# Patient Record
Sex: FEMALE | Race: BLACK OR AFRICAN AMERICAN | NOT HISPANIC OR LATINO | Employment: OTHER | RURAL
[De-identification: names, ages, dates, MRNs, and addresses within clinical notes are randomized per-mention and may not be internally consistent; named-entity substitution may affect disease eponyms.]

---

## 2017-06-02 ENCOUNTER — OFFICE VISIT (OUTPATIENT)
Dept: FAMILY MEDICINE CLINIC | Facility: CLINIC | Age: 52
End: 2017-06-02

## 2017-06-02 VITALS
TEMPERATURE: 98.2 F | BODY MASS INDEX: 40.05 KG/M2 | HEART RATE: 91 BPM | OXYGEN SATURATION: 98 % | DIASTOLIC BLOOD PRESSURE: 94 MMHG | WEIGHT: 204 LBS | HEIGHT: 60 IN | RESPIRATION RATE: 16 BRPM | SYSTOLIC BLOOD PRESSURE: 162 MMHG

## 2017-06-02 DIAGNOSIS — J30.1 SEASONAL ALLERGIC RHINITIS DUE TO POLLEN: ICD-10-CM

## 2017-06-02 DIAGNOSIS — E11.9 TYPE 2 DIABETES MELLITUS WITHOUT COMPLICATION, WITHOUT LONG-TERM CURRENT USE OF INSULIN (HCC): ICD-10-CM

## 2017-06-02 DIAGNOSIS — I10 ESSENTIAL HYPERTENSION: Primary | ICD-10-CM

## 2017-06-02 DIAGNOSIS — Z11.59 NEED FOR HEPATITIS C SCREENING TEST: ICD-10-CM

## 2017-06-02 PROCEDURE — 99213 OFFICE O/P EST LOW 20 MIN: CPT | Performed by: NURSE PRACTITIONER

## 2017-06-02 RX ORDER — DILTIAZEM HYDROCHLORIDE 120 MG/1
120 TABLET, FILM COATED ORAL 2 TIMES DAILY
Qty: 30 TABLET | Refills: 5 | Status: SHIPPED | OUTPATIENT
Start: 2017-06-02 | End: 2017-11-02 | Stop reason: SDUPTHER

## 2017-06-02 RX ORDER — CLONIDINE HYDROCHLORIDE 0.2 MG/1
0.2 TABLET ORAL DAILY
Qty: 30 TABLET | Refills: 5 | Status: SHIPPED | OUTPATIENT
Start: 2017-06-02 | End: 2017-11-02 | Stop reason: SDUPTHER

## 2017-06-02 RX ORDER — LOSARTAN POTASSIUM 100 MG/1
100 TABLET ORAL DAILY
Qty: 30 TABLET | Refills: 3 | Status: SHIPPED | OUTPATIENT
Start: 2017-06-02 | End: 2017-11-02 | Stop reason: SDUPTHER

## 2017-06-02 RX ORDER — SPIRONOLACTONE 25 MG/1
25 TABLET ORAL DAILY
Qty: 30 TABLET | Refills: 5 | Status: SHIPPED | OUTPATIENT
Start: 2017-06-02 | End: 2017-11-02 | Stop reason: SDUPTHER

## 2017-06-02 RX ORDER — GLIMEPIRIDE 4 MG/1
4 TABLET ORAL 2 TIMES DAILY
Qty: 60 TABLET | Refills: 5 | Status: SHIPPED | OUTPATIENT
Start: 2017-06-02 | End: 2017-11-02 | Stop reason: SDUPTHER

## 2017-06-02 RX ORDER — CETIRIZINE HYDROCHLORIDE 10 MG/1
10 TABLET ORAL DAILY
Qty: 30 TABLET | Refills: 5 | Status: SHIPPED | OUTPATIENT
Start: 2017-06-02 | End: 2017-11-02 | Stop reason: SDUPTHER

## 2017-06-02 NOTE — PROGRESS NOTES
Subjective   Modesta Kaur is a 51 y.o. female.     HPI Comments: Here today for zhanna on her dm and her htn.  She says her b/s is running about 100 or less most of the time.  She did not get her labs drawn the last time she was here.    Diabetes   She presents for her follow-up diabetic visit. She has type 2 diabetes mellitus. Her disease course has been stable. There are no hypoglycemic associated symptoms. Pertinent negatives for hypoglycemia include no headaches or sweats. There are no diabetic associated symptoms. Pertinent negatives for diabetes include no blurred vision and no chest pain. There are no hypoglycemic complications. Symptoms are stable. There are no diabetic complications. Pertinent negatives for diabetic complications include no CVA, PVD or retinopathy. Risk factors for coronary artery disease include diabetes mellitus, hypertension, obesity and post-menopausal. Current diabetic treatment includes oral agent (dual therapy). She is compliant with treatment all of the time. Her weight is stable. She is following a diabetic diet. When asked about meal planning, she reported none. She has not had a previous visit with a dietitian. She rarely participates in exercise. She monitors blood glucose at home 1-2 x per day. Her breakfast blood glucose is taken between 6-7 am. Her breakfast blood glucose range is generally  mg/dl. An ACE inhibitor/angiotensin II receptor blocker is being taken. She does not see a podiatrist.Eye exam is current.   Hypertension   This is a chronic problem. The current episode started more than 1 year ago. The problem is controlled. Pertinent negatives include no anxiety, blurred vision, chest pain, headaches, malaise/fatigue, neck pain, orthopnea, palpitations, peripheral edema, PND, shortness of breath or sweats. There are no associated agents to hypertension. Risk factors for coronary artery disease include diabetes mellitus, obesity and post-menopausal state. Past  treatments include angiotensin blockers, central alpha agonists and calcium channel blockers. The current treatment provides significant improvement. There are no compliance problems.  There is no history of angina, kidney disease, CAD/MI, CVA, heart failure, left ventricular hypertrophy, PVD or retinopathy.        The following portions of the patient's history were reviewed and updated as appropriate: allergies, current medications, past family history, past medical history, past social history, past surgical history and problem list.    Review of Systems   Constitutional: Negative.  Negative for malaise/fatigue.   HENT: Negative.    Eyes: Negative for blurred vision.   Respiratory: Negative.  Negative for shortness of breath.    Cardiovascular: Negative.  Negative for chest pain, palpitations, orthopnea and PND.   Musculoskeletal: Negative.  Negative for neck pain.   Skin: Negative.    Neurological: Negative.  Negative for headaches.   Psychiatric/Behavioral: Negative.        Objective   Physical Exam   Constitutional: She is oriented to person, place, and time. She appears well-developed and well-nourished. No distress.   HENT:   Head: Normocephalic.   Eyes: Pupils are equal, round, and reactive to light.   Neck: Normal range of motion. No thyromegaly present.   Cardiovascular: Normal rate, regular rhythm and normal heart sounds.  Exam reveals no friction rub.    No murmur heard.  Pulmonary/Chest: Effort normal and breath sounds normal. No respiratory distress. She has no wheezes. She has no rales.   Abdominal: Soft.   Musculoskeletal: Normal range of motion.   Neurological: She is alert and oriented to person, place, and time.   Skin: Skin is warm and dry.   Psychiatric: She has a normal mood and affect. Thought content normal.   Nursing note and vitals reviewed.      Assessment/Plan   Modesta was seen today for diabetes and hypertension.    Diagnoses and all orders for this visit:    Essential hypertension  -      losartan (COZAAR) 100 MG tablet; Take 1 tablet by mouth Daily.  -     CloNIDine (CATAPRES) 0.2 MG tablet; Take 1 tablet by mouth Daily.  -     diltiaZEM (CARDIZEM) 120 MG tablet; Take 1 tablet by mouth 2 (Two) Times a Day.  -     spironolactone (ALDACTONE) 25 MG tablet; Take 1 tablet by mouth Daily.  -     Comprehensive metabolic panel  -     Lipid panel    Type 2 diabetes mellitus without complication, without long-term current use of insulin  -     metFORMIN (GLUCOPHAGE) 1000 MG tablet; Take 1 tablet by mouth 2 (Two) Times a Day.  -     glimepiride (AMARYL) 4 MG tablet; Take 1 tablet by mouth 2 (Two) Times a Day.  -     MicroAlbumin, Urine, Random  -     Hemoglobin A1c  -     Lipid panel    Seasonal allergic rhinitis due to pollen  -     cetirizine (zyrTEC) 10 MG tablet; Take 1 tablet by mouth Daily.    Need for hepatitis C screening test  -     Hepatitis C Antibody      Lab Results   Component Value Date    HGBA1C 7.0 (H) 05/23/2016

## 2017-06-08 ENCOUNTER — LAB (OUTPATIENT)
Dept: LAB | Facility: CLINIC | Age: 52
End: 2017-06-08

## 2017-06-08 DIAGNOSIS — E11.9 TYPE 2 DIABETES MELLITUS WITHOUT COMPLICATION, WITHOUT LONG-TERM CURRENT USE OF INSULIN (HCC): ICD-10-CM

## 2017-06-08 DIAGNOSIS — I10 ESSENTIAL HYPERTENSION: ICD-10-CM

## 2017-06-08 LAB
ALBUMIN SERPL-MCNC: 4.1 G/DL (ref 3.4–4.8)
ALBUMIN UR-MCNC: 5.5 MG/L
ALBUMIN/GLOB SERPL: 1.2 G/DL (ref 1.1–1.8)
ALP SERPL-CCNC: 124 U/L (ref 38–126)
ALT SERPL W P-5'-P-CCNC: 34 U/L (ref 9–52)
ANION GAP SERPL CALCULATED.3IONS-SCNC: 11 MMOL/L (ref 5–15)
ARTICHOKE IGE QN: 136 MG/DL (ref 1–129)
AST SERPL-CCNC: 19 U/L (ref 14–36)
BILIRUB SERPL-MCNC: 0.5 MG/DL (ref 0.2–1.3)
BUN BLD-MCNC: 15 MG/DL (ref 7–21)
BUN/CREAT SERPL: 16.7 (ref 7–25)
CALCIUM SPEC-SCNC: 9.4 MG/DL (ref 8.4–10.2)
CHLORIDE SERPL-SCNC: 103 MMOL/L (ref 95–110)
CHOLEST SERPL-MCNC: 180 MG/DL (ref 0–199)
CO2 SERPL-SCNC: 29 MMOL/L (ref 22–31)
CREAT BLD-MCNC: 0.9 MG/DL (ref 0.5–1)
GFR SERPL CREATININE-BSD FRML MDRD: 80 ML/MIN/1.73 (ref 51–120)
GLOBULIN UR ELPH-MCNC: 3.5 GM/DL (ref 2.3–3.5)
GLUCOSE BLD-MCNC: 109 MG/DL (ref 60–100)
HBA1C MFR BLD: 7.57 % (ref 4–5.6)
HDLC SERPL-MCNC: 41 MG/DL (ref 60–200)
LDLC/HDLC SERPL: 2.82 {RATIO} (ref 0–3.22)
POTASSIUM BLD-SCNC: 4.3 MMOL/L (ref 3.5–5.1)
PROT SERPL-MCNC: 7.6 G/DL (ref 6.3–8.6)
SODIUM BLD-SCNC: 143 MMOL/L (ref 137–145)
TRIGL SERPL-MCNC: 116 MG/DL (ref 20–199)

## 2017-06-08 PROCEDURE — 82043 UR ALBUMIN QUANTITATIVE: CPT | Performed by: NURSE PRACTITIONER

## 2017-06-08 PROCEDURE — 83036 HEMOGLOBIN GLYCOSYLATED A1C: CPT | Performed by: NURSE PRACTITIONER

## 2017-06-08 PROCEDURE — 86803 HEPATITIS C AB TEST: CPT | Performed by: NURSE PRACTITIONER

## 2017-06-08 PROCEDURE — 80053 COMPREHEN METABOLIC PANEL: CPT | Performed by: NURSE PRACTITIONER

## 2017-06-08 PROCEDURE — 80061 LIPID PANEL: CPT | Performed by: NURSE PRACTITIONER

## 2017-06-09 LAB — HCV AB SER DONR QL: NEGATIVE

## 2017-11-02 ENCOUNTER — OFFICE VISIT (OUTPATIENT)
Dept: FAMILY MEDICINE CLINIC | Facility: CLINIC | Age: 52
End: 2017-11-02

## 2017-11-02 VITALS
HEIGHT: 60 IN | SYSTOLIC BLOOD PRESSURE: 160 MMHG | BODY MASS INDEX: 40.25 KG/M2 | HEART RATE: 94 BPM | OXYGEN SATURATION: 98 % | RESPIRATION RATE: 18 BRPM | WEIGHT: 205 LBS | DIASTOLIC BLOOD PRESSURE: 84 MMHG | TEMPERATURE: 98.3 F

## 2017-11-02 DIAGNOSIS — I10 ESSENTIAL HYPERTENSION: ICD-10-CM

## 2017-11-02 DIAGNOSIS — E11.9 TYPE 2 DIABETES MELLITUS WITHOUT COMPLICATION, WITHOUT LONG-TERM CURRENT USE OF INSULIN (HCC): Primary | ICD-10-CM

## 2017-11-02 DIAGNOSIS — J30.1 SEASONAL ALLERGIC RHINITIS DUE TO POLLEN, UNSPECIFIED CHRONICITY: ICD-10-CM

## 2017-11-02 PROCEDURE — 99214 OFFICE O/P EST MOD 30 MIN: CPT | Performed by: NURSE PRACTITIONER

## 2017-11-02 RX ORDER — LOSARTAN POTASSIUM 100 MG/1
100 TABLET ORAL DAILY
Qty: 30 TABLET | Refills: 3 | Status: SHIPPED | OUTPATIENT
Start: 2017-11-02 | End: 2018-06-04

## 2017-11-02 RX ORDER — GLIMEPIRIDE 4 MG/1
4 TABLET ORAL 2 TIMES DAILY
Qty: 60 TABLET | Refills: 5 | Status: SHIPPED | OUTPATIENT
Start: 2017-11-02 | End: 2017-11-09 | Stop reason: SDUPTHER

## 2017-11-02 RX ORDER — CLONIDINE HYDROCHLORIDE 0.2 MG/1
0.2 TABLET ORAL DAILY
Qty: 30 TABLET | Refills: 5 | Status: SHIPPED | OUTPATIENT
Start: 2017-11-02 | End: 2018-06-04

## 2017-11-02 RX ORDER — SPIRONOLACTONE 25 MG/1
25 TABLET ORAL DAILY
Qty: 30 TABLET | Refills: 5 | Status: SHIPPED | OUTPATIENT
Start: 2017-11-02 | End: 2018-06-19 | Stop reason: SDUPTHER

## 2017-11-02 RX ORDER — DILTIAZEM HYDROCHLORIDE 120 MG/1
120 TABLET, FILM COATED ORAL 2 TIMES DAILY
Qty: 30 TABLET | Refills: 5 | Status: SHIPPED | OUTPATIENT
Start: 2017-11-02 | End: 2018-06-04

## 2017-11-02 RX ORDER — CETIRIZINE HYDROCHLORIDE 10 MG/1
10 TABLET ORAL DAILY
Qty: 30 TABLET | Refills: 5 | Status: SHIPPED | OUTPATIENT
Start: 2017-11-02 | End: 2018-06-19 | Stop reason: SDUPTHER

## 2017-11-02 NOTE — PROGRESS NOTES
Subjective   Modesta Kaur is a 51 y.o. female.     HPI Comments: Here for zhanna on her dm and her htn.  She is doing well and having no diff.  Her b/s runs about 150 on average.      Hypertension   This is a chronic problem. The current episode started more than 1 year ago. The problem is controlled. Pertinent negatives include no anxiety, blurred vision, chest pain, headaches, malaise/fatigue, neck pain, orthopnea, palpitations, peripheral edema, PND, shortness of breath or sweats. There are no associated agents to hypertension. Risk factors for coronary artery disease include diabetes mellitus, obesity and post-menopausal state. Past treatments include central alpha agonists, calcium channel blockers, angiotensin blockers and diuretics. The current treatment provides significant improvement. There are no compliance problems.  There is no history of angina, kidney disease, CAD/MI, CVA, heart failure, left ventricular hypertrophy, PVD or retinopathy.   Diabetes   She presents for her follow-up diabetic visit. She has type 2 diabetes mellitus. Her disease course has been stable. There are no hypoglycemic associated symptoms. Pertinent negatives for hypoglycemia include no headaches or sweats. There are no diabetic associated symptoms. Pertinent negatives for diabetes include no blurred vision and no chest pain. There are no hypoglycemic complications. Symptoms are stable. There are no diabetic complications. Pertinent negatives for diabetic complications include no CVA, PVD or retinopathy. Risk factors for coronary artery disease include diabetes mellitus, hypertension, obesity, post-menopausal and sedentary lifestyle. Current diabetic treatment includes oral agent (dual therapy). She is compliant with treatment all of the time. Her weight is stable. She is following a diabetic diet. Meal planning includes avoidance of concentrated sweets. She has not had a previous visit with a dietitian. She participates in  exercise intermittently. She monitors blood glucose at home 1-2 x per day. Her breakfast blood glucose is taken between 6-7 am. Her breakfast blood glucose range is generally 140-180 mg/dl. An ACE inhibitor/angiotensin II receptor blocker is being taken. She does not see a podiatrist.Eye exam is current.        The following portions of the patient's history were reviewed and updated as appropriate: allergies, current medications, past family history, past medical history, past social history, past surgical history and problem list.    Review of Systems   Constitutional: Negative.  Negative for malaise/fatigue.   HENT: Negative.    Eyes: Negative for blurred vision.   Respiratory: Negative.  Negative for shortness of breath.    Cardiovascular: Negative.  Negative for chest pain, palpitations, orthopnea and PND.   Musculoskeletal: Negative for neck pain.   Skin: Negative.    Neurological: Negative.  Negative for headaches.   Psychiatric/Behavioral: Negative.        Objective   Physical Exam   Constitutional: She is oriented to person, place, and time. She appears well-developed and well-nourished. No distress.   HENT:   Head: Normocephalic.   Eyes: Pupils are equal, round, and reactive to light.   Neck: Normal range of motion. Neck supple. No thyromegaly present.   Cardiovascular: Normal rate, regular rhythm and normal heart sounds.  Exam reveals no friction rub.    No murmur heard.  Pulmonary/Chest: Effort normal and breath sounds normal. No respiratory distress. She has no wheezes. She has no rales.   Abdominal: Soft.   Musculoskeletal: Normal range of motion.   Neurological: She is alert and oriented to person, place, and time.   Skin: Skin is warm and dry.   Psychiatric: She has a normal mood and affect. Thought content normal.   Nursing note and vitals reviewed.      Assessment/Plan   Modesta was seen today for hypertension and diabetes.    Diagnoses and all orders for this visit:    Type 2 diabetes mellitus  without complication, without long-term current use of insulin  -     Hemoglobin A1c; Future  -     glimepiride (AMARYL) 4 MG tablet; Take 1 tablet by mouth 2 (Two) Times a Day.  -     metFORMIN (GLUCOPHAGE) 1000 MG tablet; Take 1 tablet by mouth 2 (Two) Times a Day.    Seasonal allergic rhinitis due to pollen, unspecified chronicity  -     cetirizine (zyrTEC) 10 MG tablet; Take 1 tablet by mouth Daily.    Essential hypertension  -     CloNIDine (CATAPRES) 0.2 MG tablet; Take 1 tablet by mouth Daily.  -     diltiaZEM (CARDIZEM) 120 MG tablet; Take 1 tablet by mouth 2 (Two) Times a Day.  -     losartan (COZAAR) 100 MG tablet; Take 1 tablet by mouth Daily.  -     spironolactone (ALDACTONE) 25 MG tablet; Take 1 tablet by mouth Daily.      Lab Results   Component Value Date    HGBA1C 7.57 (H) 06/08/2017

## 2017-11-09 DIAGNOSIS — E11.9 TYPE 2 DIABETES MELLITUS WITHOUT COMPLICATION, WITHOUT LONG-TERM CURRENT USE OF INSULIN (HCC): ICD-10-CM

## 2017-11-09 DIAGNOSIS — J45.20 MILD INTERMITTENT ASTHMA WITHOUT COMPLICATION: ICD-10-CM

## 2017-11-09 RX ORDER — GLIMEPIRIDE 4 MG/1
4 TABLET ORAL 2 TIMES DAILY
Qty: 60 TABLET | Refills: 5 | Status: SHIPPED | OUTPATIENT
Start: 2017-11-09 | End: 2018-06-04

## 2017-11-29 ENCOUNTER — OFFICE VISIT (OUTPATIENT)
Dept: FAMILY MEDICINE CLINIC | Facility: CLINIC | Age: 52
End: 2017-11-29

## 2017-11-29 VITALS
HEIGHT: 60 IN | SYSTOLIC BLOOD PRESSURE: 170 MMHG | WEIGHT: 201 LBS | BODY MASS INDEX: 39.46 KG/M2 | DIASTOLIC BLOOD PRESSURE: 96 MMHG | HEART RATE: 98 BPM | OXYGEN SATURATION: 98 % | TEMPERATURE: 97.3 F

## 2017-11-29 DIAGNOSIS — M79.604 RIGHT LEG PAIN: ICD-10-CM

## 2017-11-29 DIAGNOSIS — M79.89 SWELLING OF RIGHT LOWER EXTREMITY: Primary | ICD-10-CM

## 2017-11-29 PROCEDURE — 99213 OFFICE O/P EST LOW 20 MIN: CPT | Performed by: NURSE PRACTITIONER

## 2017-11-29 NOTE — PROGRESS NOTES
"Lázaro Kaur is a 52 y.o. female.     HPI Comments: Denies history of DVT, recent travel or surgery, denies tobacco use or hormone use.     Leg Swelling   This is a new problem. The current episode started in the past 7 days. The problem occurs constantly. Progression since onset: swelling has slightly decreased, but redness and pain have worsened. Associated symptoms include a rash (RLE red, peeling skin). Pertinent negatives include no abdominal pain, anorexia, arthralgias, change in bowel habit, chest pain, chills, congestion, coughing, diaphoresis, fatigue, fever, headaches, joint swelling, myalgias, nausea, neck pain, numbness, sore throat, swollen glands, urinary symptoms, vertigo, visual change, vomiting or weakness. Associated symptoms comments: Reports she was seen at [facility] ER last week for the \"flu\" and treated with Levaquin and that symptoms of swelling started the same day she was seen in ER and redness and pain have gradually worsened and now having peeling of skin  . The symptoms are aggravated by standing and walking. Treatments tried: elevation. The treatment provided no relief.        The following portions of the patient's history were reviewed and updated as appropriate: allergies, current medications, past medical history, past social history, past surgical history and problem list.    Review of Systems   Constitutional: Negative for chills, diaphoresis, fatigue and fever.   HENT: Negative for congestion and sore throat.    Eyes: Negative for discharge and itching.   Respiratory: Negative for cough, chest tightness, shortness of breath and wheezing.    Cardiovascular: Positive for leg swelling ( RLE). Negative for chest pain and palpitations.   Gastrointestinal: Negative for abdominal pain, anorexia, change in bowel habit, diarrhea, nausea and vomiting.   Musculoskeletal: Negative for arthralgias, joint swelling, myalgias, neck pain and neck stiffness.   Skin: Positive for rash (RLE " "red, peeling skin).   Neurological: Negative for dizziness, vertigo, weakness, numbness and headaches.   Hematological: Negative for adenopathy.       Objective    /96 (BP Location: Left arm, Patient Position: Sitting, Cuff Size: Adult)  Pulse 98  Temp 97.3 °F (36.3 °C) (Tympanic)   Ht 60\" (152.4 cm)  Wt 201 lb (91.2 kg)  LMP  (LMP Unknown)  SpO2 98%  BMI 39.26 kg/m2    Physical Exam   Constitutional: She is oriented to person, place, and time. She appears well-developed and well-nourished. No distress.   Cardiovascular: Normal rate and regular rhythm.    Negative homans sign   Pulmonary/Chest: Effort normal and breath sounds normal.   Neurological: She is alert and oriented to person, place, and time.   Skin:        RLE edema with erythema, warmth and peeling of skin primarily over toes and foot   Nursing note and vitals reviewed.      Assessment/Plan   Modesta was seen today for leg swelling and foot swelling.    Diagnoses and all orders for this visit:    Swelling of right lower extremity  -     US venous doppler lower extremity right (duplex)    Right leg pain  -     US venous doppler lower extremity right (duplex)      RLE ultrasound scheduled for this afternoon.  Will call patient with results and proceed with treatment as needed.          "

## 2017-12-01 RX ORDER — CLINDAMYCIN HYDROCHLORIDE 300 MG/1
300 CAPSULE ORAL 4 TIMES DAILY
Qty: 40 CAPSULE | Refills: 0 | Status: SHIPPED | OUTPATIENT
Start: 2017-12-01 | End: 2018-04-01

## 2017-12-01 RX ORDER — CLOTRIMAZOLE AND BETAMETHASONE DIPROPIONATE 10; .64 MG/G; MG/G
CREAM TOPICAL 2 TIMES DAILY
Qty: 45 G | Refills: 0 | Status: SHIPPED | OUTPATIENT
Start: 2017-12-01 | End: 2018-04-01

## 2018-04-01 ENCOUNTER — APPOINTMENT (OUTPATIENT)
Dept: CT IMAGING | Facility: HOSPITAL | Age: 53
End: 2018-04-01

## 2018-04-01 ENCOUNTER — HOSPITAL ENCOUNTER (EMERGENCY)
Facility: HOSPITAL | Age: 53
Discharge: ANOTHER HOSPITAL | End: 2018-04-01
Attending: FAMILY MEDICINE | Admitting: FAMILY MEDICINE

## 2018-04-01 VITALS
RESPIRATION RATE: 18 BRPM | DIASTOLIC BLOOD PRESSURE: 91 MMHG | OXYGEN SATURATION: 96 % | TEMPERATURE: 98.1 F | BODY MASS INDEX: 39.27 KG/M2 | SYSTOLIC BLOOD PRESSURE: 200 MMHG | WEIGHT: 200 LBS | HEIGHT: 60 IN | HEART RATE: 88 BPM

## 2018-04-01 DIAGNOSIS — I63.9 CEREBROVASCULAR ACCIDENT (CVA), UNSPECIFIED MECHANISM (HCC): Primary | ICD-10-CM

## 2018-04-01 LAB
ALBUMIN SERPL-MCNC: 4.4 G/DL (ref 3.4–4.8)
ALBUMIN/GLOB SERPL: 1 G/DL (ref 1.1–1.8)
ALP SERPL-CCNC: 193 U/L (ref 38–126)
ALT SERPL W P-5'-P-CCNC: 27 U/L (ref 9–52)
ANION GAP SERPL CALCULATED.3IONS-SCNC: 14 MMOL/L (ref 5–15)
AST SERPL-CCNC: 36 U/L (ref 14–36)
BASOPHILS # BLD AUTO: 0.02 10*3/MM3 (ref 0–0.2)
BASOPHILS NFR BLD AUTO: 0.2 % (ref 0–2)
BILIRUB SERPL-MCNC: 0.6 MG/DL (ref 0.2–1.3)
BUN BLD-MCNC: 11 MG/DL (ref 7–21)
BUN/CREAT SERPL: 14.5 (ref 7–25)
CALCIUM SPEC-SCNC: 9.8 MG/DL (ref 8.4–10.2)
CHLORIDE SERPL-SCNC: 102 MMOL/L (ref 95–110)
CK MB SERPL-CCNC: 0.8 NG/ML (ref 0–5)
CK SERPL-CCNC: 77 U/L (ref 30–135)
CO2 SERPL-SCNC: 25 MMOL/L (ref 22–31)
CREAT BLD-MCNC: 0.76 MG/DL (ref 0.5–1)
DEPRECATED RDW RBC AUTO: 41.3 FL (ref 36.4–46.3)
EOSINOPHIL # BLD AUTO: 1.76 10*3/MM3 (ref 0–0.7)
EOSINOPHIL NFR BLD AUTO: 13.4 % (ref 0–7)
ERYTHROCYTE [DISTWIDTH] IN BLOOD BY AUTOMATED COUNT: 13.5 % (ref 11.5–14.5)
GFR SERPL CREATININE-BSD FRML MDRD: 96 ML/MIN/1.73 (ref 51–120)
GLOBULIN UR ELPH-MCNC: 4.2 GM/DL (ref 2.3–3.5)
GLUCOSE BLD-MCNC: 122 MG/DL (ref 60–100)
HCT VFR BLD AUTO: 36.7 % (ref 35–45)
HGB BLD-MCNC: 12.5 G/DL (ref 12–15.5)
HOLD SPECIMEN: NORMAL
HOLD SPECIMEN: NORMAL
IMM GRANULOCYTES # BLD: 0.09 10*3/MM3 (ref 0–0.02)
IMM GRANULOCYTES NFR BLD: 0.7 % (ref 0–0.5)
INR PPP: 1.1 (ref 0.8–1.2)
LYMPHOCYTES # BLD AUTO: 1.99 10*3/MM3 (ref 0.6–4.2)
LYMPHOCYTES NFR BLD AUTO: 15.2 % (ref 10–50)
MAGNESIUM SERPL-MCNC: 2 MG/DL (ref 1.6–2.3)
MCH RBC QN AUTO: 28.9 PG (ref 26.5–34)
MCHC RBC AUTO-ENTMCNC: 34.1 G/DL (ref 31.4–36)
MCV RBC AUTO: 85 FL (ref 80–98)
MONOCYTES # BLD AUTO: 0.63 10*3/MM3 (ref 0–0.9)
MONOCYTES NFR BLD AUTO: 4.8 % (ref 0–12)
NEUTROPHILS # BLD AUTO: 8.63 10*3/MM3 (ref 2–8.6)
NEUTROPHILS NFR BLD AUTO: 65.7 % (ref 37–80)
PLATELET # BLD AUTO: 447 10*3/MM3 (ref 150–450)
PMV BLD AUTO: 11 FL (ref 8–12)
POTASSIUM BLD-SCNC: 3.9 MMOL/L (ref 3.5–5.1)
PROT SERPL-MCNC: 8.6 G/DL (ref 6.3–8.6)
PROTHROMBIN TIME: 14 SECONDS (ref 11.1–15.3)
RBC # BLD AUTO: 4.32 10*6/MM3 (ref 3.77–5.16)
SODIUM BLD-SCNC: 141 MMOL/L (ref 137–145)
TROPONIN I SERPL-MCNC: <0.012 NG/ML
WBC NRBC COR # BLD: 13.12 10*3/MM3 (ref 3.2–9.8)
WHOLE BLOOD HOLD SPECIMEN: NORMAL
WHOLE BLOOD HOLD SPECIMEN: NORMAL

## 2018-04-01 PROCEDURE — 82550 ASSAY OF CK (CPK): CPT | Performed by: FAMILY MEDICINE

## 2018-04-01 PROCEDURE — 96374 THER/PROPH/DIAG INJ IV PUSH: CPT

## 2018-04-01 PROCEDURE — 93010 ELECTROCARDIOGRAM REPORT: CPT | Performed by: INTERNAL MEDICINE

## 2018-04-01 PROCEDURE — 83735 ASSAY OF MAGNESIUM: CPT | Performed by: FAMILY MEDICINE

## 2018-04-01 PROCEDURE — 93005 ELECTROCARDIOGRAM TRACING: CPT | Performed by: FAMILY MEDICINE

## 2018-04-01 PROCEDURE — 99284 EMERGENCY DEPT VISIT MOD MDM: CPT

## 2018-04-01 PROCEDURE — 84484 ASSAY OF TROPONIN QUANT: CPT | Performed by: FAMILY MEDICINE

## 2018-04-01 PROCEDURE — 70450 CT HEAD/BRAIN W/O DYE: CPT

## 2018-04-01 PROCEDURE — 80053 COMPREHEN METABOLIC PANEL: CPT | Performed by: FAMILY MEDICINE

## 2018-04-01 PROCEDURE — 85610 PROTHROMBIN TIME: CPT | Performed by: FAMILY MEDICINE

## 2018-04-01 PROCEDURE — 96361 HYDRATE IV INFUSION ADD-ON: CPT

## 2018-04-01 PROCEDURE — 85025 COMPLETE CBC W/AUTO DIFF WBC: CPT | Performed by: FAMILY MEDICINE

## 2018-04-01 PROCEDURE — 82553 CREATINE MB FRACTION: CPT | Performed by: FAMILY MEDICINE

## 2018-04-01 RX ORDER — LABETALOL HYDROCHLORIDE 5 MG/ML
10 INJECTION, SOLUTION INTRAVENOUS ONCE
Status: COMPLETED | OUTPATIENT
Start: 2018-04-01 | End: 2018-04-01

## 2018-04-01 RX ORDER — SODIUM CHLORIDE 9 MG/ML
INJECTION, SOLUTION INTRAVENOUS
Status: DISCONTINUED
Start: 2018-04-01 | End: 2018-04-01 | Stop reason: HOSPADM

## 2018-04-01 RX ADMIN — SODIUM CHLORIDE 500 ML: 9 INJECTION, SOLUTION INTRAVENOUS at 14:07

## 2018-04-01 RX ADMIN — LABETALOL HYDROCHLORIDE 10 MG: 5 INJECTION, SOLUTION INTRAVENOUS at 15:17

## 2018-04-01 NOTE — ED PROVIDER NOTES
Subjective   Patient states that she woke up her usual self yesterday, and then her right-sided weakness began yesterday at 7 AM.  According to patient and family, she was seen at [facility] emergency room yesterday at 11 AM, had a CAT scan performed, and was discharged home        Cerebrovascular Accident   The primary symptoms include focal weakness and speech change. Primary symptoms do not include headaches, seizures, dizziness, fever, nausea or vomiting. The symptoms began yesterday. The last time the patient was known to be well was 4/30/2018 7:00 AM.    The symptoms are unchanged. The neurological symptoms are multifocal.   Additional symptoms include weakness. Additional symptoms do not include neck stiffness, pain, lower back pain, leg pain, loss of balance, photophobia, aura, hallucinations, nystagmus, taste disturbance, hyperacusis, hearing loss, tinnitus, vertigo, anxiety, irritability or dysphoric mood. Medical issues also include cerebral vascular accident.       Review of Systems   Constitutional: Negative for appetite change, chills, diaphoresis, fatigue, fever and irritability.   HENT: Negative for congestion, ear discharge, ear pain, hearing loss, nosebleeds, rhinorrhea, sinus pressure, sore throat, tinnitus and trouble swallowing.    Eyes: Negative for photophobia, discharge and redness.   Respiratory: Negative for apnea, cough, chest tightness, shortness of breath and wheezing.    Cardiovascular: Negative for chest pain.   Gastrointestinal: Negative for abdominal pain, diarrhea, nausea and vomiting.   Endocrine: Negative for polyuria.   Genitourinary: Negative for dysuria, frequency and urgency.   Musculoskeletal: Negative for myalgias, neck pain and neck stiffness.   Skin: Negative for color change and rash.   Allergic/Immunologic: Negative for immunocompromised state.   Neurological: Positive for speech change, focal weakness, speech difficulty and weakness. Negative for dizziness, vertigo,  seizures, syncope, light-headedness, headaches and loss of balance.   Hematological: Negative for adenopathy. Does not bruise/bleed easily.   Psychiatric/Behavioral: Negative for behavioral problems, confusion, dysphoric mood and hallucinations.   All other systems reviewed and are negative.      Past Medical History:   Diagnosis Date   • Allergic rhinitis    • Cellulitis of other sites     right foot,resolved   • Cellulitis of other sites - right foot, resolved    • Essential hypertension    • Other specified local infections of the skin and subcutaneous tissue - right foot.    • Rash and other nonspecific skin eruption    • Streptococcal sore throat    • Type 2 diabetes mellitus    • URI (upper respiratory infection)        Allergies   Allergen Reactions   • Lisinopril    • Triamterene        History reviewed. No pertinent surgical history.    Family History   Problem Relation Age of Onset   • Diabetes Other        Social History     Social History   • Marital status:      Social History Main Topics   • Smoking status: Never Smoker   • Smokeless tobacco: Never Used   • Alcohol use No   • Drug use: Unknown   • Sexual activity: Defer     Other Topics Concern   • Not on file           Objective   Physical Exam   Constitutional: She is oriented to person, place, and time. She appears well-developed and well-nourished.   HENT:   Head: Normocephalic and atraumatic.   Nose: Nose normal.   Mouth/Throat: Oropharynx is clear and moist.   Eyes: Conjunctivae and EOM are normal. Pupils are equal, round, and reactive to light. Right eye exhibits no discharge. Left eye exhibits no discharge. No scleral icterus.   Neck: Normal range of motion. Neck supple. No tracheal deviation present.   Cardiovascular: Normal rate, regular rhythm and normal heart sounds.    No murmur heard.  Pulmonary/Chest: Effort normal and breath sounds normal. No stridor. No respiratory distress. She has no wheezes. She has no rales.   Abdominal:  Soft. Bowel sounds are normal. She exhibits no distension and no mass. There is no tenderness. There is no rebound and no guarding.   Musculoskeletal: She exhibits no edema.   Neurological: She is alert and oriented to person, place, and time. She displays no tremor. A sensory deficit is present. She displays no seizure activity. Coordination normal. GCS eye subscore is 4. GCS verbal subscore is 5. GCS motor subscore is 6. She displays no Babinski's sign on the right side. She displays no Babinski's sign on the left side.   Patient is able to raise her right leg several inches off the bed against gravity.  She has normal muscle tone, 5 out of 5, on her left lower extremity.  Patient is unable to move her right arm against gravity.  She has a noticeable right facial droop and demonstrates mild dysarthria when speaking.   Skin: Skin is warm and dry. No rash noted. No erythema.   Psychiatric: She has a normal mood and affect. Her behavior is normal. Thought content normal.   Nursing note and vitals reviewed.      Procedures         ED Course  ED Course   Comment By Time   Findings discussed with Dr. Hayes at [facility] and he agrees to accept patient for further evaluation and care. At the current time we will administer labetalol 10 mg IV to reduce her blood pressure with a target goal of 170-180.  Patient is stable time of transfer and will go by ground EMS. Laron Brink MD 04/01 9951          Labs Reviewed   COMPREHENSIVE METABOLIC PANEL - Abnormal; Notable for the following:        Result Value    Glucose 122 (*)     Alkaline Phosphatase 193 (*)     Globulin 4.2 (*)     A/G Ratio 1.0 (*)     All other components within normal limits   CBC WITH AUTO DIFFERENTIAL - Abnormal; Notable for the following:     WBC 13.12 (*)     Eosinophil % 13.4 (*)     Immature Grans % 0.7 (*)     Neutrophils, Absolute 8.63 (*)     Eosinophils, Absolute 1.76 (*)     Immature Grans, Absolute 0.09 (*)     All other  components within normal limits   PROTIME-INR - Normal    Narrative:     Therapeutic range for most indications is 2.0-3.0 INR,  or 2.5-3.5 for mechanical heart valves.   MAGNESIUM - Normal   CK - Normal   CK MB - Normal   TROPONIN (IN-HOUSE) - Normal   RAINBOW DRAW    Narrative:     The following orders were created for panel order [place] Draw.  Procedure                               Abnormality         Status                     ---------                               -----------         ------                     Light Blue Top[[id number]]                                    Final result               Green Top (Gel)[[id number]]                                   Final result               Lavender Top[[id number]]                                      Final result               Gold Top - SST[[id number]]                                    Final result                 Please view results for these tests on the individual orders.   LIGHT BLUE TOP   GREEN TOP   LAVENDER TOP   GOLD TOP - SST   CBC AND DIFFERENTIAL    Narrative:     The following orders were created for panel order CBC & Differential.  Procedure                               Abnormality         Status                     ---------                               -----------         ------                     CBC Auto Differential[[id number]]        Abnormal            Final result                 Please view results for these tests on the individual orders.       CT Head Without Contrast   Final Result   Negative CT of the head without contrast.      Report was personally telephoned to Dr. Brink in the emergency   department immediately after exam performed at time of this   dictation.      [id number]      Electronically signed by:  Philip Moreno MD  4/1/2018 1:31 PM   CDT Workstation: [id number]                    [facility]  Number of Diagnoses or Management Options  Cerebrovascular accident (CVA), unspecified mechanism:      Amount and/or Complexity of Data  Reviewed  Clinical lab tests: reviewed  Tests in the radiology section of CPT®: reviewed  Tests in the medicine section of CPT®: reviewed  Decide to obtain previous medical records or to obtain history from someone other than the patient: yes  Discuss the patient with other providers: yes  Independent visualization of images, tracings, or specimens: yes    Risk of Complications, Morbidity, and/or Mortality  Presenting problems: high  Diagnostic procedures: moderate  Management options: moderate    Critical Care  Total time providing critical care: 30-74 minutes    Patient Progress  Patient progress: stable      Final diagnoses:   Cerebrovascular accident (CVA), unspecified mechanism            Laron Brink MD  04/01/18 1533

## 2018-04-01 NOTE — ED TRIAGE NOTES
Pt presents to ED with c/o sudden onset of right sided weakness, right sided facial droop, and slurred speech at 0700 yesterday morning. [place] and was evaluated and sent home. Pt reports symptoms are the same now as yesterday morning.

## 2018-05-24 ENCOUNTER — TELEPHONE (OUTPATIENT)
Dept: FAMILY MEDICINE CLINIC | Facility: CLINIC | Age: 53
End: 2018-05-24

## 2018-06-04 ENCOUNTER — OFFICE VISIT (OUTPATIENT)
Dept: FAMILY MEDICINE CLINIC | Facility: CLINIC | Age: 53
End: 2018-06-04

## 2018-06-04 DIAGNOSIS — I63.9 CEREBROVASCULAR ACCIDENT (CVA), UNSPECIFIED MECHANISM (HCC): Primary | ICD-10-CM

## 2018-06-04 DIAGNOSIS — I10 ESSENTIAL HYPERTENSION: ICD-10-CM

## 2018-06-04 DIAGNOSIS — E11.9 TYPE 2 DIABETES MELLITUS WITHOUT COMPLICATION, WITHOUT LONG-TERM CURRENT USE OF INSULIN (HCC): ICD-10-CM

## 2018-06-04 PROCEDURE — 99214 OFFICE O/P EST MOD 30 MIN: CPT | Performed by: NURSE PRACTITIONER

## 2018-06-04 RX ORDER — PREDNISONE 10 MG/1
10 TABLET ORAL DAILY
COMMUNITY
End: 2018-06-15 | Stop reason: SDUPTHER

## 2018-06-04 RX ORDER — TIZANIDINE 4 MG/1
4 TABLET ORAL 3 TIMES DAILY
COMMUNITY
End: 2018-06-19 | Stop reason: SDUPTHER

## 2018-06-04 RX ORDER — OMEPRAZOLE 20 MG/1
20 CAPSULE, DELAYED RELEASE ORAL DAILY
COMMUNITY
End: 2018-06-15 | Stop reason: SDUPTHER

## 2018-06-04 RX ORDER — ERGOCALCIFEROL 1.25 MG/1
50000 CAPSULE ORAL
COMMUNITY
End: 2018-06-15 | Stop reason: SDUPTHER

## 2018-06-04 RX ORDER — POTASSIUM CHLORIDE 750 MG/1
10 TABLET, FILM COATED, EXTENDED RELEASE ORAL 2 TIMES DAILY
COMMUNITY
End: 2018-06-19 | Stop reason: SDUPTHER

## 2018-06-04 RX ORDER — VANCOMYCIN HYDROCHLORIDE 125 MG/1
125 CAPSULE ORAL 3 TIMES WEEKLY
COMMUNITY
End: 2019-05-03

## 2018-06-04 RX ORDER — LOSARTAN POTASSIUM 25 MG/1
25 TABLET ORAL DAILY
COMMUNITY
End: 2018-06-15 | Stop reason: SDUPTHER

## 2018-06-04 RX ORDER — MESALAMINE 400 MG/1
400 CAPSULE, DELAYED RELEASE ORAL 2 TIMES DAILY
COMMUNITY
End: 2018-06-19 | Stop reason: SDUPTHER

## 2018-06-04 RX ORDER — ATORVASTATIN CALCIUM 40 MG/1
40 TABLET, FILM COATED ORAL DAILY
COMMUNITY
End: 2018-06-15 | Stop reason: SDUPTHER

## 2018-06-04 RX ORDER — FERROUS SULFATE 325(65) MG
325 TABLET ORAL 2 TIMES DAILY
COMMUNITY
End: 2018-06-19 | Stop reason: SDUPTHER

## 2018-06-04 RX ORDER — CYPROHEPTADINE HYDROCHLORIDE 4 MG/1
TABLET ORAL
Qty: 60 TABLET | Refills: 3 | Status: SHIPPED | OUTPATIENT
Start: 2018-06-04 | End: 2019-05-03

## 2018-06-04 RX ORDER — CLOPIDOGREL BISULFATE 75 MG/1
75 TABLET ORAL DAILY
COMMUNITY
End: 2018-06-15 | Stop reason: SDUPTHER

## 2018-06-04 NOTE — PROGRESS NOTES
Subjective   Modesta Kaur is a 52 y.o. female.     Here today for zhanna.  She had a cva in April that left her weak on her right side.  Went to [facility] where they thought she didn't have a stroke and sent her home.  She then presented to [facility] in [place] and was sent to [place].  She went to rehab after that hospitalization.  Her sister is caring for her.  Home health is coming.  She also was dx with ulcerative colitis and c diff and she needs to see a gastroenterologist.    Her sister is caring for her and her b/p is running about 130/76 at home.  Her b/s is averaging about 100.  Some of her meds have been changed.  She was given something in the hospital to stimulate her appetite and her insurance will not pay for it.  She needs something else.      Cerebrovascular Accident   This is a new problem. The current episode started more than 1 month ago. The problem occurs constantly. The problem has been gradually improving. Associated symptoms include fatigue, numbness (right side) and weakness (right sided). Pertinent negatives include no abdominal pain, anorexia, arthralgias, change in bowel habit, chest pain, chills, congestion, coughing, diaphoresis, fever, headaches, joint swelling, myalgias, nausea, neck pain, rash, sore throat, swollen glands, urinary symptoms, vertigo, visual change or vomiting. Nothing aggravates the symptoms.   Diabetes   She presents for her follow-up diabetic visit. She has type 2 diabetes mellitus. Her disease course has been stable. Hypoglycemia symptoms include speech difficulty. Pertinent negatives for hypoglycemia include no headaches. Associated symptoms include fatigue and weakness (right sided). Pertinent negatives for diabetes include no chest pain and no visual change. There are no hypoglycemic complications. Symptoms are stable. Diabetic complications include a CVA. Risk factors for coronary artery disease include diabetes mellitus, dyslipidemia, hypertension, sedentary  lifestyle and post-menopausal. Current diabetic treatment includes oral agent (monotherapy). She is compliant with treatment all of the time. She is following a diabetic diet. Meal planning includes avoidance of concentrated sweets. She has not had a previous visit with a dietitian. She never (is receiving pt and speach therapy) participates in exercise. She monitors blood glucose at home 1-2 x per day. Her breakfast blood glucose is taken between 6-7 am. Her breakfast blood glucose range is generally  mg/dl. An ACE inhibitor/angiotensin II receptor blocker is being taken. She does not see a podiatrist.Eye exam is current.        The following portions of the patient's history were reviewed and updated as appropriate: allergies, current medications, past family history, past medical history, past social history, past surgical history and problem list.    Review of Systems   Constitutional: Positive for fatigue. Negative for chills, diaphoresis and fever.   HENT: Negative.  Negative for congestion and sore throat.    Respiratory: Negative.  Negative for cough.    Cardiovascular: Negative.  Negative for chest pain.   Gastrointestinal: Negative for abdominal pain, anorexia, change in bowel habit, nausea and vomiting.   Musculoskeletal: Negative for arthralgias, joint swelling, myalgias and neck pain.        Right sided weakness due to cva   Skin: Negative for rash.   Neurological: Positive for speech difficulty, weakness (right sided) and numbness (right side). Negative for vertigo.   Psychiatric/Behavioral: Negative.        Objective   Physical Exam   Constitutional: She is oriented to person, place, and time. She appears well-developed and well-nourished. No distress.   HENT:   Head: Normocephalic.   Eyes: Pupils are equal, round, and reactive to light.   Neck: Normal range of motion. Neck supple. No thyromegaly present.   Cardiovascular: Normal rate, regular rhythm and normal heart sounds.  Exam reveals no  friction rub.    No murmur heard.  Pulmonary/Chest: Effort normal and breath sounds normal. No respiratory distress. She has no wheezes. She has no rales.   Abdominal: Soft. Bowel sounds are normal. She exhibits no distension.   Musculoskeletal:   Right sided weakness due to cva.  Has poor use of right hand and arm.  Is wheelchair bound.  Also poor use of right foot and leg.   Neurological: She is alert and oriented to person, place, and time. Coordination abnormal.   Skin: Skin is warm and dry.   Psychiatric:   Affect is very flat.   Nursing note and vitals reviewed.        Assessment/Plan   Modesta was seen today for cerebrovascular accident and diabetes.    Diagnoses and all orders for this visit:    Cerebrovascular accident (CVA), unspecified mechanism    Type 2 diabetes mellitus without complication, without long-term current use of insulin    Essential hypertension    Other orders  -     cyproheptadine (PERIACTIN) 4 MG tablet; Take one tab twice daily for appetitie      She will cont with present meds.  She is receiving pt and needs a foot and ankle brace requested by pt.  Script for this is written and given to family member.  Will zhanna in 1 month.

## 2018-06-06 VITALS
HEART RATE: 88 BPM | DIASTOLIC BLOOD PRESSURE: 96 MMHG | TEMPERATURE: 97.8 F | WEIGHT: 201 LBS | OXYGEN SATURATION: 98 % | SYSTOLIC BLOOD PRESSURE: 154 MMHG | HEIGHT: 60 IN | BODY MASS INDEX: 39.46 KG/M2 | RESPIRATION RATE: 20 BRPM

## 2018-06-15 RX ORDER — PREDNISONE 10 MG/1
10 TABLET ORAL DAILY
Qty: 30 TABLET | Refills: 3 | Status: SHIPPED | OUTPATIENT
Start: 2018-06-15 | End: 2018-11-05 | Stop reason: SDUPTHER

## 2018-06-15 RX ORDER — OMEPRAZOLE 20 MG/1
20 CAPSULE, DELAYED RELEASE ORAL DAILY
Qty: 30 CAPSULE | Refills: 3 | Status: SHIPPED | OUTPATIENT
Start: 2018-06-15 | End: 2018-06-19 | Stop reason: SDUPTHER

## 2018-06-15 RX ORDER — ERGOCALCIFEROL 1.25 MG/1
50000 CAPSULE ORAL
Qty: 4 CAPSULE | Refills: 3 | Status: SHIPPED | OUTPATIENT
Start: 2018-06-15 | End: 2018-06-19 | Stop reason: SDUPTHER

## 2018-06-15 RX ORDER — CLOPIDOGREL BISULFATE 75 MG/1
75 TABLET ORAL DAILY
Qty: 30 TABLET | Refills: 3 | Status: SHIPPED | OUTPATIENT
Start: 2018-06-15 | End: 2018-06-19 | Stop reason: SDUPTHER

## 2018-06-15 RX ORDER — ATORVASTATIN CALCIUM 40 MG/1
40 TABLET, FILM COATED ORAL DAILY
Qty: 30 TABLET | Refills: 3 | Status: SHIPPED | OUTPATIENT
Start: 2018-06-15 | End: 2018-06-19 | Stop reason: SDUPTHER

## 2018-06-15 RX ORDER — LOSARTAN POTASSIUM 25 MG/1
25 TABLET ORAL DAILY
Qty: 30 TABLET | Refills: 3 | Status: SHIPPED | OUTPATIENT
Start: 2018-06-15 | End: 2018-06-19

## 2018-06-15 NOTE — TELEPHONE ENCOUNTER
Blood pressure running high was 172/99 this morning couple days this week the bottom number was in the low 100's

## 2018-06-19 ENCOUNTER — TELEPHONE (OUTPATIENT)
Dept: FAMILY MEDICINE CLINIC | Facility: CLINIC | Age: 53
End: 2018-06-19

## 2018-06-19 DIAGNOSIS — I10 ESSENTIAL HYPERTENSION: ICD-10-CM

## 2018-06-19 DIAGNOSIS — J30.1 SEASONAL ALLERGIC RHINITIS DUE TO POLLEN, UNSPECIFIED CHRONICITY: ICD-10-CM

## 2018-06-19 RX ORDER — OMEPRAZOLE 20 MG/1
20 CAPSULE, DELAYED RELEASE ORAL DAILY
Qty: 30 CAPSULE | Refills: 3 | Status: SHIPPED | OUTPATIENT
Start: 2018-06-19 | End: 2019-08-05 | Stop reason: SDUPTHER

## 2018-06-19 RX ORDER — CLOPIDOGREL BISULFATE 75 MG/1
75 TABLET ORAL DAILY
Qty: 30 TABLET | Refills: 3 | Status: SHIPPED | OUTPATIENT
Start: 2018-06-19 | End: 2019-03-11 | Stop reason: SDUPTHER

## 2018-06-19 RX ORDER — FERROUS SULFATE 325(65) MG
325 TABLET ORAL 2 TIMES DAILY
Qty: 60 TABLET | Refills: 3 | Status: SHIPPED | OUTPATIENT
Start: 2018-06-19 | End: 2018-11-21 | Stop reason: SDUPTHER

## 2018-06-19 RX ORDER — POTASSIUM CHLORIDE 750 MG/1
10 TABLET, FILM COATED, EXTENDED RELEASE ORAL DAILY
Qty: 39 TABLET | Refills: 3 | Status: SHIPPED | OUTPATIENT
Start: 2018-06-19 | End: 2019-08-05 | Stop reason: SDUPTHER

## 2018-06-19 RX ORDER — SPIRONOLACTONE 25 MG/1
25 TABLET ORAL DAILY
Qty: 30 TABLET | Refills: 5 | Status: SHIPPED | OUTPATIENT
Start: 2018-06-19 | End: 2019-03-11 | Stop reason: SDUPTHER

## 2018-06-19 RX ORDER — CETIRIZINE HYDROCHLORIDE 10 MG/1
10 TABLET ORAL DAILY
Qty: 30 TABLET | Refills: 5 | Status: SHIPPED | OUTPATIENT
Start: 2018-06-19 | End: 2019-08-05 | Stop reason: SDUPTHER

## 2018-06-19 RX ORDER — LOSARTAN POTASSIUM 100 MG/1
100 TABLET ORAL DAILY
Qty: 30 TABLET | Refills: 5 | Status: SHIPPED | OUTPATIENT
Start: 2018-06-19 | End: 2019-03-11 | Stop reason: SDUPTHER

## 2018-06-19 RX ORDER — TIZANIDINE 4 MG/1
4 TABLET ORAL 3 TIMES DAILY
Qty: 90 TABLET | Refills: 3 | Status: SHIPPED | OUTPATIENT
Start: 2018-06-19 | End: 2018-11-26 | Stop reason: SDUPTHER

## 2018-06-19 RX ORDER — ERGOCALCIFEROL 1.25 MG/1
50000 CAPSULE ORAL
Qty: 4 CAPSULE | Refills: 3 | Status: SHIPPED | OUTPATIENT
Start: 2018-06-19 | End: 2019-03-11 | Stop reason: SDUPTHER

## 2018-06-19 RX ORDER — ATORVASTATIN CALCIUM 40 MG/1
40 TABLET, FILM COATED ORAL DAILY
Qty: 30 TABLET | Refills: 3 | Status: SHIPPED | OUTPATIENT
Start: 2018-06-19 | End: 2019-03-11 | Stop reason: SDUPTHER

## 2018-06-19 RX ORDER — MESALAMINE 400 MG/1
400 CAPSULE, DELAYED RELEASE ORAL 2 TIMES DAILY
Qty: 180 CAPSULE | Refills: 3 | Status: SHIPPED | OUTPATIENT
Start: 2018-06-19 | End: 2019-05-03

## 2018-06-19 NOTE — TELEPHONE ENCOUNTER
PATIENTS SISTER CALLED AND BLOOD PRESSURE IS RUNNING HIGH BOTTOM NUMBER IS I THE 'S LAST COUPLE OF DAYS WANTS TO KNOW IF YOU WANT TO ADD ANOTHER BP MED TO KEEP IT DOWN

## 2018-06-19 NOTE — TELEPHONE ENCOUNTER
I have already talked to home health and we are increasing her losartan back up to 100mg.  Home health also thinks she needs refills on everything else.  You can let her know I am sending the meds in to her pharmacy before I leave today.

## 2018-06-21 ENCOUNTER — APPOINTMENT (OUTPATIENT)
Dept: GENERAL RADIOLOGY | Facility: HOSPITAL | Age: 53
End: 2018-06-21

## 2018-06-21 ENCOUNTER — APPOINTMENT (OUTPATIENT)
Dept: CT IMAGING | Facility: HOSPITAL | Age: 53
End: 2018-06-21

## 2018-06-21 ENCOUNTER — HOSPITAL ENCOUNTER (EMERGENCY)
Facility: HOSPITAL | Age: 53
Discharge: ANOTHER HOSPITAL | End: 2018-06-21
Attending: EMERGENCY MEDICINE | Admitting: EMERGENCY MEDICINE

## 2018-06-21 VITALS
BODY MASS INDEX: 37.3 KG/M2 | WEIGHT: 190 LBS | TEMPERATURE: 98 F | SYSTOLIC BLOOD PRESSURE: 189 MMHG | HEART RATE: 98 BPM | RESPIRATION RATE: 18 BRPM | OXYGEN SATURATION: 96 % | DIASTOLIC BLOOD PRESSURE: 88 MMHG | HEIGHT: 60 IN

## 2018-06-21 DIAGNOSIS — I63.9 CEREBROVASCULAR ACCIDENT (CVA), UNSPECIFIED MECHANISM (HCC): Primary | ICD-10-CM

## 2018-06-21 LAB
ALBUMIN SERPL-MCNC: 4.6 G/DL (ref 3.4–4.8)
ALBUMIN/GLOB SERPL: 1.3 G/DL (ref 1.1–1.8)
ALP SERPL-CCNC: 135 U/L (ref 38–126)
ALT SERPL W P-5'-P-CCNC: 34 U/L (ref 9–52)
ANION GAP SERPL CALCULATED.3IONS-SCNC: 11 MMOL/L (ref 5–15)
APTT PPP: 31.5 SECONDS (ref 20–40.3)
AST SERPL-CCNC: 46 U/L (ref 14–36)
BASOPHILS # BLD AUTO: 0.03 10*3/MM3 (ref 0–0.2)
BASOPHILS NFR BLD AUTO: 0.3 % (ref 0–2)
BILIRUB SERPL-MCNC: 1 MG/DL (ref 0.2–1.3)
BUN BLD-MCNC: 13 MG/DL (ref 7–21)
BUN/CREAT SERPL: 17.3 (ref 7–25)
CALCIUM SPEC-SCNC: 9.6 MG/DL (ref 8.4–10.2)
CHLORIDE SERPL-SCNC: 102 MMOL/L (ref 95–110)
CO2 SERPL-SCNC: 26 MMOL/L (ref 22–31)
CREAT BLD-MCNC: 0.75 MG/DL (ref 0.5–1)
DEPRECATED RDW RBC AUTO: 48.9 FL (ref 36.4–46.3)
EOSINOPHIL # BLD AUTO: 0.04 10*3/MM3 (ref 0–0.7)
EOSINOPHIL NFR BLD AUTO: 0.4 % (ref 0–7)
ERYTHROCYTE [DISTWIDTH] IN BLOOD BY AUTOMATED COUNT: 15.7 % (ref 11.5–14.5)
GFR SERPL CREATININE-BSD FRML MDRD: 98 ML/MIN/1.73 (ref 51–120)
GLOBULIN UR ELPH-MCNC: 3.5 GM/DL (ref 2.3–3.5)
GLUCOSE BLD-MCNC: 138 MG/DL (ref 60–100)
HCT VFR BLD AUTO: 36.6 % (ref 35–45)
HGB BLD-MCNC: 12.2 G/DL (ref 12–15.5)
HOLD SPECIMEN: NORMAL
IMM GRANULOCYTES # BLD: 0.07 10*3/MM3 (ref 0–0.02)
IMM GRANULOCYTES NFR BLD: 0.7 % (ref 0–0.5)
INR PPP: 1.1 (ref 0.8–1.2)
LYMPHOCYTES # BLD AUTO: 1.3 10*3/MM3 (ref 0.6–4.2)
LYMPHOCYTES NFR BLD AUTO: 12.8 % (ref 10–50)
MCH RBC QN AUTO: 28.3 PG (ref 26.5–34)
MCHC RBC AUTO-ENTMCNC: 33.3 G/DL (ref 31.4–36)
MCV RBC AUTO: 84.9 FL (ref 80–98)
MONOCYTES # BLD AUTO: 0.37 10*3/MM3 (ref 0–0.9)
MONOCYTES NFR BLD AUTO: 3.6 % (ref 0–12)
NEUTROPHILS # BLD AUTO: 8.38 10*3/MM3 (ref 2–8.6)
NEUTROPHILS NFR BLD AUTO: 82.2 % (ref 37–80)
PLATELET # BLD AUTO: 367 10*3/MM3 (ref 150–450)
PMV BLD AUTO: 11 FL (ref 8–12)
POTASSIUM BLD-SCNC: 4 MMOL/L (ref 3.5–5.1)
PROT SERPL-MCNC: 8.1 G/DL (ref 6.3–8.6)
PROTHROMBIN TIME: 14 SECONDS (ref 11.1–15.3)
RBC # BLD AUTO: 4.31 10*6/MM3 (ref 3.77–5.16)
SODIUM BLD-SCNC: 139 MMOL/L (ref 137–145)
WBC NRBC COR # BLD: 10.19 10*3/MM3 (ref 3.2–9.8)

## 2018-06-21 PROCEDURE — 96374 THER/PROPH/DIAG INJ IV PUSH: CPT

## 2018-06-21 PROCEDURE — 85610 PROTHROMBIN TIME: CPT | Performed by: EMERGENCY MEDICINE

## 2018-06-21 PROCEDURE — 85025 COMPLETE CBC W/AUTO DIFF WBC: CPT | Performed by: EMERGENCY MEDICINE

## 2018-06-21 PROCEDURE — 99284 EMERGENCY DEPT VISIT MOD MDM: CPT

## 2018-06-21 PROCEDURE — 96375 TX/PRO/DX INJ NEW DRUG ADDON: CPT

## 2018-06-21 PROCEDURE — 80053 COMPREHEN METABOLIC PANEL: CPT | Performed by: EMERGENCY MEDICINE

## 2018-06-21 PROCEDURE — 93010 ELECTROCARDIOGRAM REPORT: CPT | Performed by: INTERNAL MEDICINE

## 2018-06-21 PROCEDURE — 25010000002 HYDRALAZINE PER 20 MG: Performed by: EMERGENCY MEDICINE

## 2018-06-21 PROCEDURE — 85730 THROMBOPLASTIN TIME PARTIAL: CPT | Performed by: EMERGENCY MEDICINE

## 2018-06-21 PROCEDURE — 93005 ELECTROCARDIOGRAM TRACING: CPT | Performed by: EMERGENCY MEDICINE

## 2018-06-21 PROCEDURE — 71045 X-RAY EXAM CHEST 1 VIEW: CPT

## 2018-06-21 PROCEDURE — 70450 CT HEAD/BRAIN W/O DYE: CPT

## 2018-06-21 RX ORDER — HYDRALAZINE HYDROCHLORIDE 20 MG/ML
10 INJECTION INTRAMUSCULAR; INTRAVENOUS ONCE
Status: COMPLETED | OUTPATIENT
Start: 2018-06-21 | End: 2018-06-21

## 2018-06-21 RX ORDER — LABETALOL HYDROCHLORIDE 5 MG/ML
10 INJECTION, SOLUTION INTRAVENOUS ONCE
Status: COMPLETED | OUTPATIENT
Start: 2018-06-21 | End: 2018-06-21

## 2018-06-21 RX ADMIN — LABETALOL HYDROCHLORIDE 10 MG: 5 INJECTION INTRAVENOUS at 16:59

## 2018-06-21 RX ADMIN — HYDRALAZINE HYDROCHLORIDE 10 MG: 20 INJECTION INTRAMUSCULAR; INTRAVENOUS at 18:48

## 2018-06-28 ENCOUNTER — TELEPHONE (OUTPATIENT)
Dept: FAMILY MEDICINE CLINIC | Facility: CLINIC | Age: 53
End: 2018-06-28

## 2018-07-10 ENCOUNTER — OFFICE VISIT (OUTPATIENT)
Dept: FAMILY MEDICINE CLINIC | Facility: CLINIC | Age: 53
End: 2018-07-10

## 2018-07-10 VITALS
HEART RATE: 91 BPM | DIASTOLIC BLOOD PRESSURE: 68 MMHG | TEMPERATURE: 97.8 F | HEIGHT: 60 IN | BODY MASS INDEX: 33.18 KG/M2 | WEIGHT: 169 LBS | RESPIRATION RATE: 20 BRPM | SYSTOLIC BLOOD PRESSURE: 104 MMHG | OXYGEN SATURATION: 98 %

## 2018-07-10 DIAGNOSIS — E11.9 TYPE 2 DIABETES MELLITUS WITHOUT COMPLICATION, WITHOUT LONG-TERM CURRENT USE OF INSULIN (HCC): ICD-10-CM

## 2018-07-10 DIAGNOSIS — G81.91 RIGHT HEMIPARESIS (HCC): ICD-10-CM

## 2018-07-10 DIAGNOSIS — I63.9 CEREBROVASCULAR ACCIDENT (CVA), UNSPECIFIED MECHANISM (HCC): Primary | ICD-10-CM

## 2018-07-10 LAB — GLUCOSE BLDC GLUCOMTR-MCNC: 246 MG/DL (ref 70–130)

## 2018-07-10 PROCEDURE — 82962 GLUCOSE BLOOD TEST: CPT | Performed by: NURSE PRACTITIONER

## 2018-07-10 PROCEDURE — 99214 OFFICE O/P EST MOD 30 MIN: CPT | Performed by: NURSE PRACTITIONER

## 2018-07-10 RX ORDER — AMLODIPINE BESYLATE 10 MG/1
10 TABLET ORAL DAILY
COMMUNITY
End: 2019-03-11

## 2018-07-12 NOTE — PROGRESS NOTES
Subjective   Modesta Kaur is a 52 y.o. female.     Here today for zhanna on her dm, htn and cva.  Cont to have right sided weakness.  Has been receiving home health services for pt, but that has finished and now she needs outpatient pt.  They are not checking her b/s at home.   Monitor is broken.      Hypertension   This is a chronic problem. The current episode started more than 1 year ago. The problem is controlled. Associated symptoms include malaise/fatigue. Pertinent negatives include no anxiety, blurred vision, chest pain, headaches, neck pain, orthopnea, palpitations, peripheral edema, PND, shortness of breath or sweats. There are no associated agents to hypertension. Risk factors for coronary artery disease include diabetes mellitus, dyslipidemia, post-menopausal state, obesity and sedentary lifestyle. Past treatments include angiotensin blockers, diuretics and calcium channel blockers. Current antihypertension treatment includes angiotensin blockers, diuretics and calcium channel blockers. The current treatment provides significant improvement. There are no compliance problems.  Hypertensive end-organ damage includes CVA. There is no history of angina, kidney disease, CAD/MI, PVD or retinopathy.   Cerebrovascular Accident   This is a new problem. The current episode started more than 1 month ago. The problem occurs constantly. Associated symptoms include fatigue, numbness (right side) and weakness (right sided). Pertinent negatives include no abdominal pain, anorexia, arthralgias, change in bowel habit, chest pain, chills, congestion, coughing, diaphoresis, fever, headaches, joint swelling, myalgias, nausea, neck pain, rash, sore throat, swollen glands, urinary symptoms, vertigo, visual change or vomiting. Associated symptoms comments: Right sided weakness. The symptoms are aggravated by stress and exertion. Treatments tried: physical therapy. The treatment provided moderate relief.   Diabetes   She  presents for her follow-up diabetic visit. She has type 2 diabetes mellitus. Her disease course has been stable. There are no hypoglycemic associated symptoms. Pertinent negatives for hypoglycemia include no headaches or sweats. Associated symptoms include fatigue and weakness (right sided). Pertinent negatives for diabetes include no blurred vision, no chest pain and no visual change. There are no hypoglycemic complications. Symptoms are stable. Diabetic complications include a CVA. Pertinent negatives for diabetic complications include no PVD or retinopathy. Risk factors for coronary artery disease include diabetes mellitus, dyslipidemia, hypertension, obesity, post-menopausal and sedentary lifestyle. Current diabetic treatment includes oral agent (monotherapy). She is compliant with treatment most of the time. Her weight is stable. She is following a diabetic diet. Meal planning includes avoidance of concentrated sweets. She has not had a previous visit with a dietitian. She participates in exercise intermittently (gets physical therapy). She monitors blood glucose at home 1-2 x per week. Blood glucose monitoring compliance is inadequate. (They don't recall what it is running.) An ACE inhibitor/angiotensin II receptor blocker is being taken. She does not see a podiatrist.Eye exam is current.        The following portions of the patient's history were reviewed and updated as appropriate: allergies, current medications, past family history, past medical history, past social history, past surgical history and problem list.    Review of Systems   Constitutional: Positive for fatigue and malaise/fatigue. Negative for chills, diaphoresis and fever.   HENT: Negative.  Negative for congestion and sore throat.    Eyes: Negative for blurred vision.   Respiratory: Negative.  Negative for cough and shortness of breath.    Cardiovascular: Negative.  Negative for chest pain, palpitations, orthopnea and PND.   Gastrointestinal:  Negative for abdominal pain, anorexia, change in bowel habit, nausea and vomiting.   Musculoskeletal: Negative for arthralgias, joint swelling, myalgias and neck pain.        Right sided weakness due to cva   Skin: Negative.  Negative for rash.   Neurological: Positive for weakness (right sided) and numbness (right side). Negative for vertigo.   Psychiatric/Behavioral: Negative.        Objective   Physical Exam   Constitutional: She is oriented to person, place, and time. She appears well-developed and well-nourished. No distress.   HENT:   Head: Normocephalic.   Eyes: Pupils are equal, round, and reactive to light.   Neck: Normal range of motion. Neck supple.   Cardiovascular: Normal rate, regular rhythm and normal heart sounds.  Exam reveals no friction rub.    No murmur heard.  Pulmonary/Chest: Effort normal and breath sounds normal. No respiratory distress. She has no wheezes. She has no rales.   Abdominal: Soft.   Musculoskeletal:   Poor use of right side of body.     Neurological: She is alert and oriented to person, place, and time.   Skin: Skin is warm and dry.   Psychiatric: She has a normal mood and affect. Thought content normal.   Nursing note and vitals reviewed.        Assessment/Plan   Modesta was seen today for hypertension and cerebrovascular accident.    Diagnoses and all orders for this visit:    Cerebrovascular accident (CVA), unspecified mechanism (CMS/[facility])  -     Ambulatory Referral to Physical Therapy Evaluate and treat    Right hemiparesis (CMS/[facility])  -     Ambulatory Referral to Physical Therapy Evaluate and treat    Type 2 diabetes mellitus without complication, without long-term current use of insulin (CMS/[facility])  -     POC Glucose  -     glucose blood test strip; Check blood sugar once daily for hyperglycemia    Other orders  -     Blood Glucose Monitoring Suppl w/Device kit; Check blood sugar once daily for hyperglycemia  -     Lancets 30G misc; 1 each Daily.

## 2018-07-20 ENCOUNTER — TELEPHONE (OUTPATIENT)
Dept: FAMILY MEDICINE CLINIC | Facility: CLINIC | Age: 53
End: 2018-07-20

## 2018-07-20 NOTE — TELEPHONE ENCOUNTER
WANTS REFERRAL TO GASTRO HERE IN [facility] CANCELED ONE IN [place] TO [facility] FOR SISTER TO DRIVE. SEEMS LIKE HER ULCERS ARE ACTING UP BOWELS RUNNY AGAIN.

## 2018-07-24 ENCOUNTER — HOSPITAL ENCOUNTER (OUTPATIENT)
Dept: PHYSICAL THERAPY | Facility: HOSPITAL | Age: 53
Setting detail: THERAPIES SERIES
Discharge: HOME | End: 2018-07-24

## 2018-07-24 DIAGNOSIS — I63.9 CEREBROVASCULAR ACCIDENT (CVA), UNSPECIFIED MECHANISM (HCC): Primary | ICD-10-CM

## 2018-07-24 DIAGNOSIS — G81.91 RIGHT HEMIPARESIS (HCC): ICD-10-CM

## 2018-07-24 PROCEDURE — 97162 PT EVAL MOD COMPLEX 30 MIN: CPT | Performed by: PHYSICAL THERAPIST

## 2018-07-24 PROCEDURE — 97110 THERAPEUTIC EXERCISES: CPT | Performed by: PHYSICAL THERAPIST

## 2018-07-24 NOTE — THERAPY EVALUATION
Outpatient Physical Therapy Ortho Initial Evaluation  [facility]     Patient Name: Modesta Kaur  : 1965  MRN: [id number]  Today's Date: 2018      Visit Date: 2018  Visit   Return to MD: QUIN  Re-cert date: 18    Patient Active Problem List   Diagnosis   • Type 2 diabetes mellitus without complication, without long-term current use of insulin (CMS/HCC)   • Mild intermittent asthma without complication   • Essential hypertension   • Seasonal allergic rhinitis due to pollen   • Need for hepatitis C screening test        Past Medical History:   Diagnosis Date   • Allergic rhinitis    • Asthma    • Cellulitis of other sites     right foot,resolved   • Cellulitis of other sites - right foot, resolved    • Cerebrovascular accident (CMS/HCC)    • Essential hypertension    • Other specified local infections of the skin and subcutaneous tissue - right foot.    • Rash and other nonspecific skin eruption    • Right hemiparesis (CMS/HCC)    • Streptococcal sore throat    • Stroke (CMS/HCC)    • Type 2 diabetes mellitus (CMS/HCC)    • URI (upper respiratory infection)         History reviewed. No pertinent surgical history.    Visit Dx:     ICD-10-CM ICD-9-CM   1. Cerebrovascular accident (CVA), unspecified mechanism (CMS/HCC) I63.9 434.91   2. Right hemiparesis (CMS/HCC) G81.91 342.90     Medications (Admitted on 2018)     amLODIPine (NORVASC) 10 MG tablet     atorvastatin (LIPITOR) 40 MG tablet     Blood Glucose Monitoring Suppl w/Device kit     cetirizine (zyrTEC) 10 MG tablet     clopidogrel (PLAVIX) 75 MG tablet     cyproheptadine (PERIACTIN) 4 MG tablet     ferrous sulfate 325 (65 FE) MG tablet     glucose blood test strip     Lancets 30G misc     losartan (COZAAR) 100 MG tablet     mesalamine (DELZICOL) 400 MG capsule delayed-release delayed release capsule     metFORMIN (GLUCOPHAGE) 500 MG tablet     omeprazole (priLOSEC) 20 MG capsule     potassium chloride (K-DUR)  10 MEQ CR tablet     predniSONE (DELTASONE) 10 MG tablet     Probiotic Product (PROBIOTIC-10 PO)     spironolactone (ALDACTONE) 25 MG tablet     tiZANidine (ZANAFLEX) 4 MG tablet     vancomycin (VANCOCIN) 125 MG capsule     vitamin D (ERGOCALCIFEROL) 97096 units capsule capsule      Allergies: Lisinopril, Triamterene            PT Ortho     Row Name 07/24/18 1100       Subjective Comments    Subjective Comments 51 yo female with an acute CVA with right hemiparesis on 3/31/18. Had rehab in [place] at [facility] from April to May of 2018, started home health PT/OT/ST in late May 2018. Has had ongoing gait, balance deficits and R UE>LE weakness.   -BS       Precautions and Contraindications    Precautions/Limitations fall precautions  -BS    Precautions hypertonicity with R UE>LE; pt wears R AFO  -BS       Subjective Pain    Able to rate subjective pain? yes  -BS    Pre-Treatment Pain Level 0  -BS    Post-Treatment Pain Level 0  -BS       Posture/Observations    Posture/Observations Comments R UE postured in flexor synergy; requires PROM with clinician to extend R elbow fully, R hand/digits fixed in excessive flexion  -BS       General Assessment (Manual Muscle Testing)    Comment, General Manual Muscle Testing (MMT) Assessment R UE-0/5 except pt able to shrug right shoulder; R LE-hip flex 3-/5 knee flex 0/5 knee ext 3-/5 ankle 0/5-all planes; L UE-4/5 or greater; L LE-hip flex 5/5 knee flex/ext 5/5 ankle DF 5/5  -BS       Sensation    Light Touch Severe deficits in the RUE;Severe deficits in the RLE  -BS    Additional Comments hypertonicity (lead pipe rigidity) with R elbow  -BS       Transfers    Bed-Chair [place] (Transfers) minimum assist (75% patient effort)  -BS    Chair-Bed [place] (Transfers) minimum assist (75% patient effort)  -BS    Assistive Device (Bed-Chair Transfers) walker, mayito  -BS    Sit-Stand [place] (Transfers) contact guard;1 person assist  -BS    Stand-Sit [place]  (Transfers) contact guard;1 person assist  -BS    Transfer, Safety Issues balance decreased during turns;step length decreased  -BS    Transfer, Impairments muscle tone abnormal;decreased flexibility;sensation decreased;strength decreased;impaired balance;motor control impaired  -BS    Comment (Transfers) [place] supine <>sit (on gym mat)  -BS       Gait/Stairs Assessment/Training    Comment (Gait/Stairs) gait training x 92 ft [place] with hemiwalker, step to pattern, R hip excessively externally rotated throughout gait cycle, decreased R knee flex throughout swing phase.   -BS       Hand  Strength     Strength Affected Side --  -BS      User Key  (r) = Recorded By, (t) = Taken By, (c) = Cosigned By    Initials Name Provider Type    BS Matt Miller, PT Physical Therapist                      Therapy Education  Given: Mobility training, HEP, Posture/body mechanics  Program: New  How Provided: Verbal, Demonstration  Provided to: Patient, Other (comment) (pt's sister)  Level of Understanding: Verbalized, Demonstrated           PT OP Goals     Row Name 07/24/18 1100          PT Short Term Goals    STG Date to Achieve 08/07/18  -BS     STG 1 Patient/family independent with HEP.  -BS     STG 1 Progress New  -BS     STG 2 Supervision supine <> sit  -BS     STG 2 Progress New  -BS     STG 3 SBA sit to stand w/ AD  -BS     STG 3 Progress New  -BS     STG 4 Gait training x 150 ft with hemiwalker (HW) w/ increased R knee flex throughout swing phase of gait cycle  -BS     STG 4 Progress New  -BS        Long Term Goals    LTG Date to Achieve 08/21/18  -BS     LTG 1 Independent supine <> sit  -BS     LTG 1 Progress New  -BS     LTG 2 Supervision sit to stand with AD  -BS     LTG 2 Progress New  -BS     LTG 3 Gait training x 300ft+ with HW or LBQC with consistent (>75%) swing through gait pattern   -BS     LTG 3 Progress New  -BS     LTG 4 Improve R LE MMT (hip flex, knee ext) to 3+/5  -BS     LTG 4 Progress New  -BS     LTG 5  Improve R knee flex (hamstrings) MMT to 2-/5   -BS     LTG 5 Progress New  -BS        Time Calculation    PT Goal Re-Cert Due Date 08/14/18  -BS       User Key  (r) = Recorded By, (t) = Taken By, (c) = Cosigned By    Initials Name Provider Type    BS Matt Miller, PT Physical Therapist                PT Assessment/Plan     Row Name 07/24/18 1200          PT Assessment    Functional Limitations Impaired gait;Performance in work activities;Performance in sport activities;Performance in self-care ADL;Performance in leisure activities;Limitations in community activities;Limitation in home management  -BS     Impairments Balance;Cognition;Coordination;Endurance;Gait;Sensation;Range of motion;Posture;Muscle strength;Motor function  -BS     Assessment Comments Gait abnormality due to recent CVA with R hemiparesis.  -BS     Please refer to paper survey for additional self-reported information Yes  -BS     Rehab Potential Fair  -BS     Patient/caregiver participated in establishment of treatment plan and goals Yes  -BS     Patient would benefit from skilled therapy intervention Yes  -BS        PT Plan    PT Frequency 3x/week  -BS     Predicted Duration of Therapy Intervention (Therapy Eval) 6-8 weeks  -BS     Planned CPT's? PT EVAL MOD COMPLELITY: 44231;PT RE-EVAL: 99464;PT THER PROC EA 15 MIN: 85706;PT THER ACT EA 15 MIN: 84107;PT MANUAL THERAPY EA 15 MIN: 36476;PT NEUROMUSC RE-EDUCATION EA 15 MIN: 11234;PT GAIT TRAINING EA 15 MIN: 23872;PT ELECTRICAL STIM UNATTEND: ;PT HOT OR COLD PACK TREAT MCARE;PT THER SUPP EA 15 MIN  -BS     Physical Therapy Interventions (Optional Details) balance training;bed mobility training;gait training;home exercise program;fine motor skills;lumbar stabilization;manual therapy techniques;modalities;neuromuscular re-education;motor coordination training;patient/family education;postural re-education;ROM (Range of Motion);strengthening;stretching;transfer training  -BS     PT Plan Comments  "f/u with R LE strengthening, address hamstrings, hip flexor and quad weakness. Address balance/proprioception deficits, address gait deficit.   -BS       User Key  (r) = Recorded By, (t) = Taken By, (c) = Cosigned By    Initials Name Provider Type    KELLY Miller, MAYI Physical Therapist                  Exercises     Row Name 07/24/18 1100             Subjective Comments    Subjective Comments 53 yo female with an acute CVA with right hemiparesis on 3/31/18. Had rehab in [place] at [facility] from April to May of 2018, started home health PT/OT/ST in late May 2018. Has had ongoing gait, balance deficits and R UE>LE weakness.   -BS         Subjective Pain    Able to rate subjective pain? yes  -BS      Pre-Treatment Pain Level 0  -BS      Post-Treatment Pain Level 0  -BS         Exercise 1    Exercise Name 1 seated right hip flex  -BS      Sets 1 1  -BS      Reps 1 10  -BS         Exercise 2    Exercise Name 2 R LAQ's  -BS      Sets 2 1  -BS      Reps 2 10  -BS         Exercise 3    Exercise Name 3 standing partial tandem in // bars  -BS      Reps 3 3\" max w/out UE support  -BS      Time 3 10 trials  -BS         Exercise 4    Exercise Name 4 S/L right knee flex PROM  -BS      Sets 4 1  -BS      Reps 4 10  -BS      Time 4 tc's to R hamstrings  -BS      Additional Comments absent 0/5 R hamstrings MMT  -BS        User Key  (r) = Recorded By, (t) = Taken By, (c) = Cosigned By    Initials Name Provider Type    KELLY Miller, PT Physical Therapist                        Outcome Measure Options: Lower Extremity Functional Scale (LEFS)  Lower Extremity Functional Index  Any of your usual work, housework or school activities: Extreme difficulty or unable to perform activity  Your usual hobbies, recreational or sporting activities: Extreme difficulty or unable to perform activity  Getting into or out of the bath: Extreme difficulty or unable to perform activity  Walking between rooms: Moderate " difficulty  Putting on your shoes or socks: Extreme difficulty or unable to perform activity  Squatting: Extreme difficulty or unable to perform activity  Lifting an object, like a bag of groceries from the floor: Extreme difficulty or unable to perform activity  Performing light activities around your home: Extreme difficulty or unable to perform activity  Performing heavy activities around your home: Extreme difficulty or unable to perform activity  Getting into or out of a car: Extreme difficulty or unable to perform activity  Walking 2 blocks: Extreme difficulty or unable to perform activity  Walking a mile: Extreme difficulty or unable to perform activity  Going up or down 10 stairs (about 1 flight of stairs): Extreme difficulty or unable to perform activity  Standing for 1 hour: Extreme difficulty or unable to perform activity  Sitting for 1 hour: No difficulty  Running on even ground: Extreme difficulty or unable to perform activity  Running on uneven ground: Extreme difficulty or unable to perform activity  Making sharp turns while running fast: Extreme difficulty or unable to perform activity  Hopping: Extreme difficulty or unable to perform activity  Rolling over in bed: Extreme difficulty or unable to perform activity  Total: 6      Time Calculation:     Therapy Suggested Charges     Code   Minutes Charges    None             Start Time: 1103  Stop Time: 1146  Time Calculation (min): 43 min     Therapy Charges for Today     Code Description Service Date Service Provider Modifiers Qty    74834823858 HC PT EVAL MOD COMPLEXITY 2 7/24/2018 Matt Miller, PT GP 1    93459390624 HC PT THER PROC EA 15 MIN 7/24/2018 Matt Miller, PT GP 1          PT G-Codes  Outcome Measure Options: Lower Extremity Functional Scale (LEFS)         Matt Miller, PT  7/24/2018

## 2018-07-26 ENCOUNTER — HOSPITAL ENCOUNTER (OUTPATIENT)
Dept: PHYSICAL THERAPY | Facility: HOSPITAL | Age: 53
Setting detail: THERAPIES SERIES
Discharge: HOME | End: 2018-07-26

## 2018-07-26 ENCOUNTER — TELEPHONE (OUTPATIENT)
Dept: FAMILY MEDICINE CLINIC | Facility: CLINIC | Age: 53
End: 2018-07-26

## 2018-07-26 DIAGNOSIS — I63.9 CEREBROVASCULAR ACCIDENT (CVA), UNSPECIFIED MECHANISM (HCC): Primary | ICD-10-CM

## 2018-07-26 DIAGNOSIS — G81.91 RIGHT HEMIPARESIS (HCC): ICD-10-CM

## 2018-07-26 PROCEDURE — 97110 THERAPEUTIC EXERCISES: CPT

## 2018-07-26 NOTE — THERAPY TREATMENT NOTE
Outpatient Physical Therapy Ortho Treatment Note  [facility]     Patient Name: Modesta Kaur  : 1965  MRN: [id number]  Today's Date: 2018      Visit Date: 2018  Pt reports 0/10 pain pre treatment, 0/10 pain post treatment  Reports 0% of improvement.  Attended 2/2 visits.  Insurance available: precert after 20 visits  Next MD appt:TBD .  Recertification: 2018.  Visit Dx:    ICD-10-CM ICD-9-CM   1. Cerebrovascular accident (CVA), unspecified mechanism (CMS/HCC) I63.9 434.91   2. Right hemiparesis (CMS/HCC) G81.91 342.90       Patient Active Problem List   Diagnosis   • Type 2 diabetes mellitus without complication, without long-term current use of insulin (CMS/HCC)   • Mild intermittent asthma without complication   • Essential hypertension   • Seasonal allergic rhinitis due to pollen   • Need for hepatitis C screening test        Past Medical History:   Diagnosis Date   • Allergic rhinitis    • Asthma    • Cellulitis of other sites     right foot,resolved   • Cellulitis of other sites - right foot, resolved    • Cerebrovascular accident (CMS/HCC)    • Essential hypertension    • Other specified local infections of the skin and subcutaneous tissue - right foot.    • Rash and other nonspecific skin eruption    • Right hemiparesis (CMS/HCC)    • Streptococcal sore throat    • Stroke (CMS/HCC)    • Type 2 diabetes mellitus (CMS/HCC)    • URI (upper respiratory infection)         No past surgical history on file.          PT Ortho     Row Name 18 1100       Precautions and Contraindications    Precautions/Limitations fall precautions  -TL    Precautions hypertonicity with R UE>LE; pt wears R AFO  -TL       Subjective Pain    Able to rate subjective pain? yes  -TL    Pre-Treatment Pain Level 0  -TL    Post-Treatment Pain Level 0  -TL       Posture/Observations    Posture/Observations Comments R UE postured in flexor synergy; requires PROM with clinician to extend  R elbow fully, R hand/digits fixed in excessive flexion  -TL    Row Name 07/24/18 1100       Subjective Comments    Subjective Comments 53 yo female with an acute CVA with right hemiparesis on 3/31/18. Had rehab in [facility] from April to May of 2018, started home health PT/OT/ST in late May 2018. Has had ongoing gait, balance deficits and R UE>LE weakness.   -BS       Precautions and Contraindications    Precautions/Limitations fall precautions  -BS    Precautions hypertonicity with R UE>LE; pt wears R AFO  -BS       Subjective Pain    Able to rate subjective pain? yes  -BS    Pre-Treatment Pain Level 0  -BS    Post-Treatment Pain Level 0  -BS       Posture/Observations    Posture/Observations Comments R UE postured in flexor synergy; requires PROM with clinician to extend R elbow fully, R hand/digits fixed in excessive flexion  -BS       General Assessment (Manual Muscle Testing)    Comment, General Manual Muscle Testing (MMT) Assessment R UE-0/5 except pt able to shrug right shoulder; R LE-hip flex 3-/5 knee flex 0/5 knee ext 3-/5 ankle 0/5-all planes; L UE-4/5 or greater; L LE-hip flex 5/5 knee flex/ext 5/5 ankle DF 5/5  -BS       Sensation    Light Touch Severe deficits in the RUE;Severe deficits in the RLE  -BS    Additional Comments hypertonicity (lead pipe rigidity) with R elbow  -BS       Transfers    Bed-Chair [place] (Transfers) minimum assist (75% patient effort)  -BS    Chair-Bed [place] (Transfers) minimum assist (75% patient effort)  -BS    Assistive Device (Bed-Chair Transfers) walker, mayito  -BS    Sit-Stand [place] (Transfers) contact guard;1 person assist  -BS    Stand-Sit [place] (Transfers) contact guard;1 person assist  -BS    Transfer, Safety Issues balance decreased during turns;step length decreased  -BS    Transfer, Impairments muscle tone abnormal;decreased flexibility;sensation decreased;strength decreased;impaired balance;motor control impaired   -BS    Comment (Transfers) [place] supine <>sit (on gym mat)  -BS       Gait/Stairs Assessment/Training    Comment (Gait/Stairs) gait training x 92 ft [place] with hemiwalker, step to pattern, R hip excessively externally rotated throughout gait cycle, decreased R knee flex throughout swing phase.   -BS       Hand  Strength     Strength Affected Side --  -BS      User Key  (r) = Recorded By, (t) = Taken By, (c) = Cosigned By    Initials Name Provider Type    BS Matt Miller, PT Physical Therapist    TL Camilla Mccullough PTA Physical Therapy Assistant                            PT Assessment/Plan     Row Name 07/26/18 1200          PT Assessment    Assessment Comments Pt working toward goals this date. No goals met at this time. Pt able to walk good distance with SBA of 1 with verbal cues for foot and walker placement without LOB noted. Pt tolerated pro ll with UE /LE for 10 mins with hand and foot wrap for support. Pt worked on core strengthening with bridges added hip add using ball to help with adductor weakness. Pt tolerated getting on KULDIP for ext in trunk and weight bear through right elbow and shld.  -TL        PT Plan    PT Frequency 3x/week  -TL     PT Plan Comments add step ups and lateral step ups. Talked to therapist add aquatic therapy to plan if no contraidicators.   ask pt to bring in all HEP from Home Health and rehab.  -TL       User Key  (r) = Recorded By, (t) = Taken By, (c) = Cosigned By    Initials Name Provider Type    PAUL Mccullough PTA Physical Therapy Assistant                    Exercises     Row Name 07/26/18 1100             Subjective Comments    Subjective Comments Pt denies pain this date. pt has HEP from Home Health.  -TL         Subjective Pain    Able to rate subjective pain? yes  -TL      Pre-Treatment Pain Level 0  -TL      Post-Treatment Pain Level 0  -TL         Exercise 1    Exercise Name 1 Pro ll legs/UE with right UE/LE wrapped  -TL      Time 1 10mins  -TL       Additional Comments level 3  -TL         Exercise 2    Exercise Name 2 Gait: wall cane  -TL      Additional Comments 155 feet SBA with cues on proper foot placement  -TL         Exercise 3    Exercise Name 3 Stretching to the Right shld/elbow/wrist/hand  -TL      Time 3 10 mins  -TL      Additional Comments flex/ext/abd  -TL         Exercise 4    Exercise Name 4 Bridging with ball hip add  -TL      Sets 4 2  -TL      Reps 4 10  -TL      Time 4 5 sec hold  -TL         Exercise 5    Exercise Name 5 supine push down and pull up with right leg  -TL      Sets 5 2  -TL      Reps 5 10  -TL      Additional Comments with some resistent  -TL         Exercise 6    Exercise Name 6 KULDIP  -TL      Time 6 5 mins  -TL        User Key  (r) = Recorded By, (t) = Taken By, (c) = Cosigned By    Initials Name Provider Type    TL Camilla Mccullough PTA Physical Therapy Assistant                               PT OP Goals     Row Name 07/26/18 1200          PT Short Term Goals    STG Date to Achieve 08/07/18  -TL     STG 1 Patient/family independent with HEP.  -TL     STG 1 Progress Ongoing  -TL     STG 2 Supervision supine <> sit  -TL     STG 2 Progress Ongoing  -TL     STG 3 SBA sit to stand w/ AD  -TL     STG 3 Progress Ongoing  -TL     STG 4 Gait training x 150 ft with hemiwalker (HW) w/ increased R knee flex throughout swing phase of gait cycle  -TL     STG 4 Progress Ongoing  -TL        Long Term Goals    LTG Date to Achieve 08/21/18  -TL     LTG 1 Independent supine <> sit  -TL     LTG 1 Progress Ongoing  -TL     LTG 2 Supervision sit to stand with AD  -TL     LTG 2 Progress Ongoing  -TL     LTG 3 Gait training x 300ft+ with HW or LBQC with consistent (>75%) swing through gait pattern   -TL     LTG 3 Progress New  -TL     LTG 4 Improve R LE MMT (hip flex, knee ext) to 3+/5  -TL     LTG 4 Progress New  -TL     LTG 5 Improve R knee flex (hamstrings) MMT to 2-/5   -TL     LTG 5 Progress New  -TL        Time Calculation    PT Goal Re-Cert  Due Date 08/14/18  -       User Key  (r) = Recorded By, (t) = Taken By, (c) = Cosigned By    Initials Name Provider Type     Camilla Mccullough PTA Physical Therapy Assistant          Therapy Education  Education Details: education mobility using wall cane with foot placement, add bridges with hip add using ball, KULDIP  Given: HEP, Posture/body mechanics, Fall prevention and home safety, Mobility training  Program: New  How Provided: Verbal, Demonstration  Provided to: Patient, Caregiver  Level of Understanding: Verbalized, Demonstrated              Time Calculation:   Start Time: 1045  Stop Time: 1155  Time Calculation (min): 70 min  Total Timed Code Minutes- PT: 70 minute(s)  Therapy Suggested Charges     Code   Minutes Charges    None           Therapy Charges for Today     Code Description Service Date Service Provider Modifiers Qty    32334427523 HC PT THER PROC EA 15 MIN 7/26/2018 Camilla Mccullough PTA GP 5                    Camilla Mccullough PTA  7/26/2018

## 2018-07-31 ENCOUNTER — HOSPITAL ENCOUNTER (OUTPATIENT)
Dept: PHYSICAL THERAPY | Facility: HOSPITAL | Age: 53
Setting detail: THERAPIES SERIES
Discharge: HOME | End: 2018-07-31

## 2018-07-31 DIAGNOSIS — I63.9 CEREBROVASCULAR ACCIDENT (CVA), UNSPECIFIED MECHANISM (HCC): Primary | ICD-10-CM

## 2018-07-31 DIAGNOSIS — G81.91 RIGHT HEMIPARESIS (HCC): ICD-10-CM

## 2018-07-31 PROCEDURE — 97116 GAIT TRAINING THERAPY: CPT

## 2018-07-31 PROCEDURE — 97110 THERAPEUTIC EXERCISES: CPT

## 2018-08-01 ENCOUNTER — HOSPITAL ENCOUNTER (OUTPATIENT)
Dept: PHYSICAL THERAPY | Facility: HOSPITAL | Age: 53
Setting detail: THERAPIES SERIES
Discharge: HOME | End: 2018-08-01

## 2018-08-01 DIAGNOSIS — G81.91 RIGHT HEMIPARESIS (HCC): ICD-10-CM

## 2018-08-01 DIAGNOSIS — I63.9 CEREBROVASCULAR ACCIDENT (CVA), UNSPECIFIED MECHANISM (HCC): Primary | ICD-10-CM

## 2018-08-01 PROCEDURE — 97110 THERAPEUTIC EXERCISES: CPT

## 2018-08-01 NOTE — THERAPY TREATMENT NOTE
Outpatient Physical Therapy Neuro Treatment Note  [facility]     Patient Name: Modesta Kaur  : 1965  MRN: [id number]  Today's Date: 2018      Visit Date: 2018  Pt reports 0/10 pain pre treatment, 0/10 pain post treatment  Reports 0% of improvement.  Attended 4/4 visits.  Insurance available:precert after 20   Next MD appt:TBD .  Recertification: 2018.  Visit Dx:    ICD-10-CM ICD-9-CM   1. Cerebrovascular accident (CVA), unspecified mechanism (CMS/HCC) I63.9 434.91   2. Right hemiparesis (CMS/[facility]) G81.91 342.90       Patient Active Problem List   Diagnosis   • Type 2 diabetes mellitus without complication, without long-term current use of insulin (CMS/[facility])   • Mild intermittent asthma without complication   • Essential hypertension   • Seasonal allergic rhinitis due to pollen   • Need for hepatitis C screening test               PT Ortho     Row Name 18 1042       Subjective Comments    Subjective Comments Pt reports that she working on HEP  -TL       Precautions and Contraindications    Precautions/Limitations fall precautions  -TL    Precautions hypertonicity with R UE>LE; pt wears R AFO  -TL      User Key  (r) = Recorded By, (t) = Taken By, (c) = Cosigned By    Initials Name Provider Type    TL Camilla Mccullough PTA Physical Therapy Assistant                          PT Assessment/Plan     Row Name 18 1200          PT Assessment    Assessment Comments Pt met short term goal #3. Pt progressing toward other goals. Pt has trouble with walking too fast with wall cane. Pt able to correct self with cues. Pt today was able to sit to stand with ball between knees without UE support. PTA continue to work on safety issues and strengthening. Pt tolerated prone on elbows but unable at this time to get pt in QP.   Pt continues to have alot of tone in right UE and some in LE  -TL        PT Plan    PT Frequency 3x/week  -TL     PT Plan Comments will start aquatic  therapy next visit  -TL       User Key  (r) = Recorded By, (t) = Taken By, (c) = Cosigned By    Initials Name Provider Type    TL Camilla Mccullough PTA Physical Therapy Assistant                     Exercises     Row Name 08/01/18 1042             Subjective Comments    Subjective Comments Pt reports that she working on HEP  -TL         Subjective Pain    Able to rate subjective pain? yes  -TL      Pre-Treatment Pain Level 0  -TL      Post-Treatment Pain Level 0   Pt does have some pain with moving right UE but denies after  -TL         Exercise 1    Exercise Name 1 Pro ll LEGs/UE with assistance with UE  -TL      Time 1 10 mins/LE, 5 mins UE with assist  -TL      Additional Comments level 3  -TL         Exercise 2    Exercise Name 2 Gait : wall cane  -TL      Cueing 2 Verbal;Tactile;Auditory   proper step sequence  -TL      Additional Comments 85 feet, 60 feet, 100 feet CGA to SBA  -TL         Exercise 3    Exercise Name 3 Prone on elbows  -TL      Time 3 10 mins  -TL         Exercise 4    Exercise Name 4 Prone ham curls   -TL      Sets 4 2  -TL      Reps 4 7  -TL      Additional Comments right leg  -TL         Exercise 5    Exercise Name 5 SLR  -TL      Cueing 5 Verbal;Tactile;Demo  -TL      Sets 5 2  -TL      Reps 5 10  -TL      Time 5 3-5 sec hold  -TL         Exercise 6    Exercise Name 6 Clams  -TL      Sets 6 2  -TL      Reps 6 10  -TL      Additional Comments right   -TL         Exercise 7    Exercise Name 7 Sidelying hip add SLR  -TL      Sets 7 2  -TL      Reps 7 10  -TL         Exercise 8    Exercise Name 8 sit to stands with ball squeezes  -TL      Sets 8 10  -TL      Additional Comments without UE  -TL         Exercise 9    Exercise Name 9 steps one at a time  -TL      Reps 9 4  -TL      Additional Comments 3 steps with one handrail  -TL         Exercise 10    Exercise Name 10 Stretches to right UE  -TL      Reps 10 10 each  -TL      Additional Comments flexion/abd/wrist ext/flex  -TL         Exercise 11     Exercise Name 11 Retro gait in // bars CGA  -TL      Time 11 5 laps  -TL         Exercise 12    Exercise Name 12 side stepping without support CGA of 1  -TL      Additional Comments 5 laps  -TL        User Key  (r) = Recorded By, (t) = Taken By, (c) = Cosigned By    Initials Name Provider Type    PAUL Mccullough PTA Physical Therapy Assistant                              PT OP Goals     Row Name 08/01/18 1042 08/01/18 1041       PT Short Term Goals    STG Date to Achieve  -- 08/07/18  -TL    STG 1  -- Patient/family independent with HEP.  -TL    STG 1 Progress  -- Ongoing;Progressing  -TL    STG 2  -- Supervision supine <> sit  -TL    STG 2 Progress  -- Ongoing;Progressing  -TL    STG 3  -- SBA sit to stand w/ AD  -TL    STG 3 Progress  -- Met  -TL    STG 4  -- Gait training x 150 ft with hemiwalker (HW) w/ increased R knee flex throughout swing phase of gait cycle  -TL    STG 4 Progress  -- Ongoing;Progressing  -TL       Long Term Goals    LTG Date to Achieve  -- 08/21/18  -TL    LTG 1  -- Independent supine <> sit  -TL    LTG 1 Progress  -- Ongoing;Progressing  -TL    LTG 2  -- Supervision sit to stand with AD  -TL    LTG 2 Progress  -- Ongoing;Progressing  -TL    LTG 3  -- Gait training x 300ft+ with HW or LBQC with consistent (>75%) swing through gait pattern   -TL    LTG 3 Progress  -- Ongoing;Progressing  -TL    LTG 4  -- Improve R LE MMT (hip flex, knee ext) to 3+/5  -TL    LTG 4 Progress  -- New  -TL    LTG 5  -- Improve R knee flex (hamstrings) MMT to 2-/5   -TL    LTG 5 Progress  -- New  -TL       Time Calculation    PT Goal Re-Cert Due Date 08/14/18  -TL  --      User Key  (r) = Recorded By, (t) = Taken By, (c) = Cosigned By    Initials Name Provider Type    PAUL Mccullough PTA Physical Therapy Assistant          Therapy Education  Education Details: SLR,sidelying hip add SLR,bridges with ball   Given: HEP, Symptoms/condition management, Posture/body mechanics, Fall prevention and home safety,  Mobility training  Program: New  How Provided: Verbal, Demonstration  Provided to: Patient, Caregiver  Level of Understanding: Verbalized, Demonstrated              Time Calculation:   Start Time: 1042  Stop Time: 1155  Time Calculation (min): 73 min  Total Timed Code Minutes- PT: 73 minute(s)   Therapy Suggested Charges     Code   Minutes Charges    None           Therapy Charges for Today     Code Description Service Date Service Provider Modifiers Qty    46279250257 HC PT THER PROC EA 15 MIN 8/1/2018 Camilla Mccullough, PTA GP 5                    Camilla Mccullough PTA  8/1/2018

## 2018-08-03 ENCOUNTER — HOSPITAL ENCOUNTER (OUTPATIENT)
Dept: PHYSICAL THERAPY | Facility: HOSPITAL | Age: 53
Setting detail: THERAPIES SERIES
Discharge: HOME | End: 2018-08-03

## 2018-08-03 DIAGNOSIS — G81.91 RIGHT HEMIPARESIS (HCC): ICD-10-CM

## 2018-08-03 DIAGNOSIS — I63.9 CEREBROVASCULAR ACCIDENT (CVA), UNSPECIFIED MECHANISM (HCC): Primary | ICD-10-CM

## 2018-08-03 PROCEDURE — 97110 THERAPEUTIC EXERCISES: CPT

## 2018-08-03 NOTE — THERAPY TREATMENT NOTE
Outpatient Physical Therapy Ortho Treatment Note  [facility]     Patient Name: Modesta Kaur  : 1965  MRN: [id number]  Today's Date: 8/3/2018      Visit Date: 2018  Pt reports 0/10 pain pre treatment, 0/10 pain post treatment  Reports 0 % of improvement.  Attended 4/4 visits.  Insurance available:precert after 20 visit   Next MD appt:TBD .  Recertification: 2018.  Visit Dx:    ICD-10-CM ICD-9-CM   1. Cerebrovascular accident (CVA), unspecified mechanism (CMS/HCC) I63.9 434.91   2. Right hemiparesis (CMS/HCC) G81.91 342.90       Patient Active Problem List   Diagnosis   • Type 2 diabetes mellitus without complication, without long-term current use of insulin (CMS/HCC)   • Mild intermittent asthma without complication   • Essential hypertension   • Seasonal allergic rhinitis due to pollen   • Need for hepatitis C screening test        Past Medical History:   Diagnosis Date   • Allergic rhinitis    • Asthma    • Cellulitis of other sites     right foot,resolved   • Cellulitis of other sites - right foot, resolved    • Cerebrovascular accident (CMS/HCC)    • Essential hypertension    • Other specified local infections of the skin and subcutaneous tissue - right foot.    • Rash and other nonspecific skin eruption    • Right hemiparesis (CMS/HCC)    • Streptococcal sore throat    • Stroke (CMS/HCC)    • Type 2 diabetes mellitus (CMS/HCC)    • URI (upper respiratory infection)         No past surgical history on file.          PT Ortho     Row Name 18 1200       Subjective Comments    Subjective Comments Pt agreed to do pool and land 2x a week if pt able to tolerate. PTA will have pt scheduled next available open slot.   -TL       Precautions and Contraindications    Precautions/Limitations fall precautions  -TL    Precautions hypertonicity with R UE>LE; pt wears R AFO  -TL       Subjective Pain    Able to rate subjective pain? yes  -TL    Pre-Treatment Pain Level 0   -TL    Post-Treatment Pain Level 0  -TL    Row Name 08/01/18 1042       Subjective Comments    Subjective Comments Pt reports that she working on HEP  -TL       Precautions and Contraindications    Precautions/Limitations fall precautions  -TL    Precautions hypertonicity with R UE>LE; pt wears R AFO  -TL      User Key  (r) = Recorded By, (t) = Taken By, (c) = Cosigned By    Initials Name Provider Type    Camilla Bennett PTA Physical Therapy Assistant                            PT Assessment/Plan     Row Name 08/03/18 1200          PT Assessment    Assessment Comments pt worked well with aquatic therapy. Pt with decrease tone in theraputic water with applied pressure. Pt able walk in pool better fwd/back/side stepping . Pt was able to have some right shld movement with aarom. Pt able to get in and out pool without difficulty. Pt frequency decrease to x2 week starting next week. Will try land and pool the following  -TL        PT Plan    PT Frequency 2x/week  -TL     PT Plan Comments following next week land/pool  -TL       User Key  (r) = Recorded By, (t) = Taken By, (c) = Cosigned By    Initials Name Provider Type    Camilla Bennett PTA Physical Therapy Assistant                    Exercises     Row Name 08/03/18 1200             Subjective Comments    Subjective Comments Pt agreed to do pool and land 2x a week if pt able to tolerate. PTA will have pt scheduled next available open slot.   -TL         Subjective Pain    Able to rate subjective pain? yes  -TL      Pre-Treatment Pain Level 0  -TL      Post-Treatment Pain Level 0  -TL         Exercise 1    Exercise Name 1 Aqua: walk fwd/back/lateral  -TL      Time 1 5 mins  -TL      Additional Comments with CGA of 1 each direction  -TL         Exercise 2    Exercise Name 2 Aqua:Ms  -TL      Reps 2 15  -TL         Exercise 3    Exercise Name 3 Aqua: marching  -TL      Reps 3 15  -TL         Exercise 4    Exercise Name 4 Aqua: 3-way SLR  -TL      Reps 4 15  -TL          Exercise 5    Exercise Name 5 Aqua: Ham curls  -TL      Reps 5 15  -TL         Exercise 6    Exercise Name 6 Aqua: stretching R UE flex/abd/add shld  -TL      Reps 6 10  -TL         Exercise 7    Exercise Name 7 AQUA:Elbow flex/ext  -TL      Reps 7 10  -TL      Additional Comments stretching  -TL         Exercise 8    Exercise Name 8 Aqua: 3-way UE with assist RUE  -TL      Sets 8 10  -TL        User Key  (r) = Recorded By, (t) = Taken By, (c) = Cosigned By    Initials Name Provider Type    TL Camilla Mccullough PTA Physical Therapy Assistant                             Therapy Education  Education Details: Recommended UE stretches in shower of Flex/abd/elbow flex/ext  Given: HEP, Symptoms/condition management, Pain management, Posture/body mechanics  Program: New  How Provided: Verbal, Demonstration  Provided to: Patient, Caregiver  Level of Understanding: Teach back education performed, Verbalized, Demonstrated              Time Calculation:   Start Time: 1105  Stop Time: 1158  Time Calculation (min): 53 min  Total Timed Code Minutes- PT: 53 minute(s)  Therapy Suggested Charges     Code   Minutes Charges    None           Therapy Charges for Today     Code Description Service Date Service Provider Modifiers Qty    03990365879 HC PT THER PROC EA 15 MIN 8/3/2018 Camilla Mccullough PTA GP 4                    Camilla Mccullough PTA  8/3/2018

## 2018-08-07 ENCOUNTER — HOSPITAL ENCOUNTER (OUTPATIENT)
Dept: PHYSICAL THERAPY | Facility: HOSPITAL | Age: 53
Setting detail: THERAPIES SERIES
Discharge: HOME | End: 2018-08-07

## 2018-08-07 DIAGNOSIS — G81.91 RIGHT HEMIPARESIS (HCC): ICD-10-CM

## 2018-08-07 DIAGNOSIS — I63.9 CEREBROVASCULAR ACCIDENT (CVA), UNSPECIFIED MECHANISM (HCC): Primary | ICD-10-CM

## 2018-08-07 PROCEDURE — 97110 THERAPEUTIC EXERCISES: CPT

## 2018-08-07 NOTE — THERAPY TREATMENT NOTE
Outpatient Physical Therapy Ortho Treatment Note  [facility]     Patient Name: Modesta Kaur  : 1965  MRN: [id number]  Today's Date: 2018      Visit Date: 2018  Pt reports 0/10 pain pre treatment, 0/10 pain post treatment  Reports 0% of improvement.  Attended 5/5 visits.  Insurance available:precert after 20 visit   Next MD appt: TBD .  Recertification: 2018.  Visit Dx:    ICD-10-CM ICD-9-CM   1. Cerebrovascular accident (CVA), unspecified mechanism (CMS/HCC) I63.9 434.91   2. Right hemiparesis (CMS/HCC) G81.91 342.90       Patient Active Problem List   Diagnosis   • Type 2 diabetes mellitus without complication, without long-term current use of insulin (CMS/HCC)   • Mild intermittent asthma without complication   • Essential hypertension   • Seasonal allergic rhinitis due to pollen   • Need for hepatitis C screening test        Past Medical History:   Diagnosis Date   • Allergic rhinitis    • Asthma    • Cellulitis of other sites     right foot,resolved   • Cellulitis of other sites - right foot, resolved    • Cerebrovascular accident (CMS/HCC)    • Essential hypertension    • Other specified local infections of the skin and subcutaneous tissue - right foot.    • Rash and other nonspecific skin eruption    • Right hemiparesis (CMS/HCC)    • Streptococcal sore throat    • Stroke (CMS/HCC)    • Type 2 diabetes mellitus (CMS/HCC)    • URI (upper respiratory infection)         No past surgical history on file.          PT Ortho     Row Name 18 1100       Precautions and Contraindications    Precautions/Limitations fall precautions  -TL    Precautions hypertonicity with R UE>LE; pt wears R AFO  -TL       Subjective Pain    Able to rate subjective pain? yes  -TL    Pre-Treatment Pain Level 0  -TL    Post-Treatment Pain Level 0  -TL      User Key  (r) = Recorded By, (t) = Taken By, (c) = Cosigned By    Initials Name Provider Type    Camilla Bennett PTA  Physical Therapy Assistant                            PT Assessment/Plan     Row Name 08/07/18 1040          PT Assessment    Assessment Comments Pt met short term goals 2 and 3 and continue working toward LTGs. Pt doing well in aquatic therapy. Pt Hamstring on right is firing.  -TL        PT Plan    PT Frequency 2x/week   land/pool  -TL     PT Plan Comments next week start land/poolx2. Start cane next land visit.  -TL       User Key  (r) = Recorded By, (t) = Taken By, (c) = Cosigned By    Initials Name Provider Type    TL Camilla Mccullough PTA Physical Therapy Assistant                    Exercises     Row Name 08/07/18 1100             Subjective Pain    Able to rate subjective pain? yes  -TL      Pre-Treatment Pain Level 0  -TL      Post-Treatment Pain Level 0  -TL         Exercise 1    Exercise Name 1 Aqua: walk fwd/back/lateral  -TL      Time 1 5 mins each directions  -TL      Additional Comments SBA of 1   -TL         Exercise 2    Exercise Name 2 AQUa : MS  -TL      Reps 2 20  -TL         Exercise 3    Exercise Name 3 Aqua: marching  -TL      Reps 3 20  -TL         Exercise 4    Exercise Name 4 Aqua: 3-way SLR  -TL      Reps 4 15  -TL         Exercise 5    Exercise Name 5 Aqua: Ham curls  -TL      Reps 5 15  -TL         Exercise 6    Exercise Name 6 Aqua: stretching R UE flex/abd/add shld  -TL      Reps 6 10  -TL         Exercise 7    Exercise Name 7 Aqua: hands clasp hands rotation  -TL      Reps 7 10  -TL         Exercise 8    Exercise Name 8 Aqua: hands clasped shld flexion  -TL      Reps 8 8  -TL         Exercise 9    Exercise Name 9 B St ham S  -TL      Reps 9 2  -TL      Time 9 30 sec hold  -TL        User Key  (r) = Recorded By, (t) = Taken By, (c) = Cosigned By    Initials Name Provider Type    TL Camilla Mccullough PTA Physical Therapy Assistant                               PT OP Goals     Row Name 08/07/18 1040          PT Short Term Goals    STG Date to Achieve 08/07/18  -TL     STG 1  Patient/family independent with HEP.  -TL     STG 1 Progress Ongoing;Progressing  -TL     STG 2 Supervision supine <> sit  -TL     STG 2 Progress Met  -TL     STG 3 SBA sit to stand w/ AD  -TL     STG 3 Progress Met  -TL     STG 4 Gait training x 150 ft with hemiwalker (HW) w/ increased R knee flex throughout swing phase of gait cycle  -TL     STG 4 Progress Ongoing;Progressing  -TL        Long Term Goals    LTG Date to Achieve 08/21/18  -TL     LTG 1 Independent supine <> sit  -TL     LTG 1 Progress Ongoing;Progressing  -TL     LTG 2 Supervision sit to stand with AD  -TL     LTG 2 Progress Ongoing;Progressing  -TL     LTG 3 Gait training x 300ft+ with HW or LBQC with consistent (>75%) swing through gait pattern   -TL     LTG 3 Progress Ongoing;Progressing  -TL     LTG 4 Improve R LE MMT (hip flex, knee ext) to 3+/5  -TL     LTG 4 Progress New  -TL     LTG 5 Improve R knee flex (hamstrings) MMT to 2-/5   -TL     LTG 5 Progress New  -TL       User Key  (r) = Recorded By, (t) = Taken By, (c) = Cosigned By    Initials Name Provider Type    TL Camilla Mccullough PTA Physical Therapy Assistant          Therapy Education  Given: HEP, Symptoms/condition management, Pain management, Posture/body mechanics  Program: Reinforced  How Provided: Verbal, Demonstration  Provided to: Patient, Caregiver  Level of Understanding: Teach back education performed, Verbalized, Demonstrated              Time Calculation:   Start Time: 1040  Stop Time: 1140  Time Calculation (min): 60 min  Total Timed Code Minutes- PT: 60 minute(s)  Therapy Suggested Charges     Code   Minutes Charges    None           Therapy Charges for Today     Code Description Service Date Service Provider Modifiers Qty    95997679414 HC PT THER PROC EA 15 MIN 8/7/2018 Camilla Mccullough PTA GP 4                    Camilla Mccullough PTA  8/7/2018

## 2018-08-08 ENCOUNTER — HOSPITAL ENCOUNTER (OUTPATIENT)
Dept: PHYSICAL THERAPY | Facility: HOSPITAL | Age: 53
Setting detail: THERAPIES SERIES
Discharge: HOME | End: 2018-08-08

## 2018-08-08 DIAGNOSIS — I63.9 CEREBROVASCULAR ACCIDENT (CVA), UNSPECIFIED MECHANISM (HCC): Primary | ICD-10-CM

## 2018-08-08 DIAGNOSIS — G81.91 RIGHT HEMIPARESIS (HCC): ICD-10-CM

## 2018-08-08 PROCEDURE — 97110 THERAPEUTIC EXERCISES: CPT

## 2018-08-08 NOTE — THERAPY TREATMENT NOTE
Outpatient Physical Therapy Ortho Treatment Note  [facility]     Patient Name: Modesta Kaur  : 1965  MRN: [id number]  Today's Date: 2018    Pt reports 0/10 pain pre treatment, 0/10 pain post treatment  Reports 0% of improvement.  Attended 6/6 visits.  Insurance available: Precert after 20 visit   Next MD appt:TDB .  Recertification: 2018.  Visit Date: 2018    Visit Dx:    ICD-10-CM ICD-9-CM   1. Cerebrovascular accident (CVA), unspecified mechanism (CMS/HCC) I63.9 434.91   2. Right hemiparesis (CMS/HCC) G81.91 342.90       Patient Active Problem List   Diagnosis   • Type 2 diabetes mellitus without complication, without long-term current use of insulin (CMS/HCC)   • Mild intermittent asthma without complication   • Essential hypertension   • Seasonal allergic rhinitis due to pollen   • Need for hepatitis C screening test        Past Medical History:   Diagnosis Date   • Allergic rhinitis    • Asthma    • Cellulitis of other sites     right foot,resolved   • Cellulitis of other sites - right foot, resolved    • Cerebrovascular accident (CMS/HCC)    • Essential hypertension    • Other specified local infections of the skin and subcutaneous tissue - right foot.    • Rash and other nonspecific skin eruption    • Right hemiparesis (CMS/HCC)    • Streptococcal sore throat    • Stroke (CMS/HCC)    • Type 2 diabetes mellitus (CMS/HCC)    • URI (upper respiratory infection)         No past surgical history on file.          PT Ortho     Row Name 18 1400       Precautions and Contraindications    Precautions/Limitations fall precautions  -TL    Precautions hypertonicity with R UE>LE; pt wears R AFO  -TL       Subjective Pain    Able to rate subjective pain? yes  -TL    Pre-Treatment Pain Level 0  -TL    Row Name 18 1100       Precautions and Contraindications    Precautions/Limitations fall precautions  -TL    Precautions hypertonicity with R UE>LE; pt wears R  AFO  -TL       Subjective Pain    Able to rate subjective pain? yes  -TL    Pre-Treatment Pain Level 0  -TL    Post-Treatment Pain Level 0  -TL      User Key  (r) = Recorded By, (t) = Taken By, (c) = Cosigned By    Initials Name Provider Type    Camilla Bennett PTA Physical Therapy Assistant                            PT Assessment/Plan     Row Name 08/08/18 1500          PT Assessment    Assessment Comments Pt became tearful today in the pool. Pt stated that the family pushes her to do things but when she tries they get on to her . PTA explained to pt to asked for assistance before getting up . Pt caregiver reports that the pt is not doing much HEP at home. Pt did do hand clasp shld flex/abd per sister. No New goals.   PTA and caregiver get in pool to assist with UE tone gait  -TL        PT Plan    PT Frequency 2x/week   land/pool  -TL     PT Plan Comments start straight cane next visit.  -TL       User Key  (r) = Recorded By, (t) = Taken By, (c) = Cosigned By    Initials Name Provider Type    Camilla Bennett PTA Physical Therapy Assistant                    Exercises     Row Name 08/08/18 1400             Subjective Pain    Able to rate subjective pain? yes  -TL      Pre-Treatment Pain Level 0  -TL      Post-Treatment Pain Level 0  -TL         Exercise 1    Exercise Name 1 Aqua: walk fwd/back/lateral  -TL      Time 1 5 mins each directions  -TL         Exercise 2    Exercise Name 2 AQUa : MS  -TL      Reps 2 20  -TL         Exercise 3    Exercise Name 3 Aqua: marching  -TL      Reps 3 20  -TL         Exercise 4    Exercise Name 4 Aqua: 3-way SLR  -TL      Reps 4 15  -TL      Additional Comments float  -TL         Exercise 5    Exercise Name 5 Aqua: Ham curls  -TL      Reps 5 20  -TL         Exercise 6    Exercise Name 6 Aqua: stretching R UE flex/abd/add shld  -TL      Reps 6 10  -TL         Exercise 7    Exercise Name 7 Aqua: wand rotations  -TL      Reps 7 15  -TL        User Key  (r) = Recorded By,  (t) = Taken By, (c) = Cosigned By    Initials Name Provider Type    TL Camilla Mccullough PTA Physical Therapy Assistant                             Therapy Education  Given: HEP  Program: Reinforced  How Provided: Verbal  Provided to: Patient, Caregiver  Level of Understanding: Verbalized              Time Calculation:   Start Time: 1349  Stop Time: 1430  Time Calculation (min): 41 min  Total Timed Code Minutes- PT: 41 minute(s)  Therapy Suggested Charges     Code   Minutes Charges    None           Therapy Charges for Today     Code Description Service Date Service Provider Modifiers Qty    69887354181 HC PT THER PROC EA 15 MIN 8/8/2018 Camilla Mccullough PTA GP 3                    Camilla Mccullough PTA  8/8/2018

## 2018-08-09 ENCOUNTER — APPOINTMENT (OUTPATIENT)
Dept: PHYSICAL THERAPY | Facility: HOSPITAL | Age: 53
End: 2018-08-09

## 2018-08-10 ENCOUNTER — APPOINTMENT (OUTPATIENT)
Dept: PHYSICAL THERAPY | Facility: HOSPITAL | Age: 53
End: 2018-08-10

## 2018-08-14 ENCOUNTER — HOSPITAL ENCOUNTER (OUTPATIENT)
Dept: PHYSICAL THERAPY | Facility: HOSPITAL | Age: 53
Setting detail: THERAPIES SERIES
Discharge: HOME | End: 2018-08-14

## 2018-08-14 DIAGNOSIS — G81.91 RIGHT HEMIPARESIS (HCC): ICD-10-CM

## 2018-08-14 DIAGNOSIS — I63.9 CEREBROVASCULAR ACCIDENT (CVA), UNSPECIFIED MECHANISM (HCC): Primary | ICD-10-CM

## 2018-08-14 PROCEDURE — 97110 THERAPEUTIC EXERCISES: CPT

## 2018-08-14 NOTE — THERAPY TREATMENT NOTE
"    Outpatient Physical Therapy Ortho Treatment Note  [facility]     Patient Name: Modesta Kaur  : 1965  MRN: [id number]  Today's Date: 2018      Visit Date: 2018  Pt reports 0/10 pain pre treatment, 0/10 pain post treatment  Reports \"stronger\"% of improvement.  Attended 7/7 visits.  Insurance available:Precert after 20 visits   Next MD appt:2018.  Recertification: 2018.  Visit Dx:    ICD-10-CM ICD-9-CM   1. Cerebrovascular accident (CVA), unspecified mechanism (CMS/HCC) I63.9 434.91   2. Right hemiparesis (CMS/HCC) G81.91 342.90       Patient Active Problem List   Diagnosis   • Type 2 diabetes mellitus without complication, without long-term current use of insulin (CMS/HCC)   • Mild intermittent asthma without complication   • Essential hypertension   • Seasonal allergic rhinitis due to pollen   • Need for hepatitis C screening test        Past Medical History:   Diagnosis Date   • Allergic rhinitis    • Asthma    • Cellulitis of other sites     right foot,resolved   • Cellulitis of other sites - right foot, resolved    • Cerebrovascular accident (CMS/HCC)    • Essential hypertension    • Other specified local infections of the skin and subcutaneous tissue - right foot.    • Rash and other nonspecific skin eruption    • Right hemiparesis (CMS/HCC)    • Streptococcal sore throat    • Stroke (CMS/HCC)    • Type 2 diabetes mellitus (CMS/HCC)    • URI (upper respiratory infection)         No past surgical history on file.          PT Ortho     Row Name 18 1015       Precautions and Contraindications    Precautions/Limitations fall precautions  -TL    Precautions hypertonicity with R UE>LE; pt wears R AFO  -TL       Subjective Pain    Able to rate subjective pain? yes  -TL    Pre-Treatment Pain Level 0  -TL    Post-Treatment Pain Level 0  -TL      User Key  (r) = Recorded By, (t) = Taken By, (c) = Cosigned By    Initials Name Provider Type    TL Lantrip, Camilla " NAI [place]W[facility] Physical Therapy Assistant                            PT Assessment/Plan     Row Name 08/14/18 1300          PT Assessment    Assessment Comments Pt has met 2-4 goals. pt continues to improve with gait. Pt did not have any LOB noted with gait training with SC. Pt struggles keeping right arm from staying on pt's chest.  -TL        PT Plan    PT Frequency 2x/week   pool/land  -TL     PT Plan Comments continue work on cane training and try tug test   -TL       User Key  (r) = Recorded By, (t) = Taken By, (c) = Cosigned By    Initials Name Provider Type    TL Camilla Mccullough PTA Physical Therapy Assistant                    Exercises     Row Name 08/14/18 1015             Subjective Pain    Able to rate subjective pain? yes  -TL      Pre-Treatment Pain Level 0  -TL      Post-Treatment Pain Level 0  -TL         Exercise 1    Exercise Name 1 Pro ll Legs  -TL      Time 1 10mins  -TL      Additional Comments level 4  -TL         Exercise 2    Exercise Name 2 PRO ll UE  -TL      Time 2 5 mins  -TL      Additional Comments level 1 assist with RUE  -TL         Exercise 3    Exercise Name 3 Gait : with sc   -TL      Cueing 3 Verbal;Tactile   cues to take longer stride length with right leg   -TL      Additional Comments 100x1,60x1 CGA of1  -TL         Exercise 4    Exercise Name 4 Sit to stands with ball squeezes  -TL      Sets 4 1  -TL      Reps 4 10  -TL         Exercise 5    Exercise Name 5 Step up fwd RLE  -TL      Reps 5 15  -TL         Exercise 6    Exercise Name 6 step down ecc heel touch  -TL      Reps 6 15  -TL         Exercise 7    Exercise Name 7 lateral side step up  -TL      Reps 7 15  -TL         Exercise 8    Exercise Name 8 wand flex ex  -TL      Reps 8 15  -TL         Exercise 9    Exercise Name 9 Stretches UE flex/abd/  -TL      Time 9 10 mins  -TL         Exercise 10    Exercise Name 10 Push and pull RUE  -TL      Reps 10 10  -TL         Exercise 11    Exercise Name 11 AQUA; walk fwd/back/lateral   -TL      Time 11 5 mins each directions  -TL         Exercise 12    Exercise Name 12 AQUA:marching  -TL      Reps 12 20  -TL         Exercise 13    Exercise Name 13 AQUA; Ham curls  -TL      Reps 13 20  -TL         Exercise 14    Exercise Name 14 AQUA: hip abd  -TL      Reps 14 20  -TL         Exercise 15    Exercise Name 15 AQUA: hip add  -TL      Reps 15 20  -TL         Exercise 16    Exercise Name 16 AQUA: wand trunk rotation  -TL      Reps 16 20  -TL         Exercise 17    Exercise Name 17 AQUA: UE stretch  -TL      Reps 17 10  -TL         Exercise 18    Exercise Name 18 AQUA; Push and pull with soft board  -TL      Reps 18 13  -TL      Additional Comments with both hands  -TL         Exercise 19    Exercise Name 19 AQUA:pull downs with soft board  -TL      Reps 19 13  -TL        User Key  (r) = Recorded By, (t) = Taken By, (c) = Cosigned By    Initials Name Provider Type    Camilla Bennett, PTA Physical Therapy Assistant                               PT OP Goals     Row Name 08/14/18 1300 08/14/18 1015       PT Short Term Goals    STG Date to Achieve 08/07/18  -TL  --    STG 1 Patient/family independent with HEP.  -TL  --    STG 1 Progress Ongoing;Progressing  -TL  --    STG 2 Supervision supine <> sit  -TL  --    STG 2 Progress Met  -TL  --    STG 3 SBA sit to stand w/ AD  -TL  --    STG 3 Progress Met  -TL  --    STG 4 Gait training x 150 ft with hemiwalker (HW) w/ increased R knee flex throughout swing phase of gait cycle  -TL  --    STG 4 Progress Met  -TL  --       Long Term Goals    LTG Date to Achieve 08/21/18  -TL  --    LTG 1 Independent supine <> sit  -TL  --    LTG 1 Progress Ongoing;Progressing  -TL  --    LTG 2 Supervision sit to stand with AD  -TL  --    LTG 2 Progress Ongoing;Progressing  -TL  --    LTG 3 Gait training x 300ft+ with HW or LBQC with consistent (>75%) swing through gait pattern   -TL  --    LTG 3 Progress Ongoing;Progressing  -TL  --    LTG 4 Improve R LE MMT (hip flex, knee  ext) to 3+/5  -TL  --    LTG 4 Progress New  -TL  --    LTG 5 Improve R knee flex (hamstrings) MMT to 2-/5   -TL  --    LTG 5 Progress New  -TL  --       Time Calculation    PT Goal Re-Cert Due Date 08/14/18  -TL 08/14/18  -TL      User Key  (r) = Recorded By, (t) = Taken By, (c) = Cosigned By    Initials Name Provider Type    TL Camilla Mccullough PTA Physical Therapy Assistant          Therapy Education  Education Details: sc with gait  Given: Posture/body mechanics, Fall prevention and home safety, Mobility training  Program: New  How Provided: Verbal, Demonstration  Provided to: Patient, Caregiver  Level of Understanding: Verbalized, Other (comment)  41837 - PT Self Care/Mgmt Minutes:  (pt needs cues.)              Time Calculation:   Start Time: 1015  Stop Time: 1200  Time Calculation (min): 105 min  Therapy Suggested Charges     Code   Minutes Charges    None           Therapy Charges for Today     Code Description Service Date Service Provider Modifiers Qty    16593384400 HC PT THER PROC EA 15 MIN 8/14/2018 Camilla Mccullough PTA GP 7                    Camilla Mccullough PTA  8/14/2018

## 2018-08-17 ENCOUNTER — HOSPITAL ENCOUNTER (OUTPATIENT)
Dept: PHYSICAL THERAPY | Facility: HOSPITAL | Age: 53
Setting detail: THERAPIES SERIES
Discharge: HOME | End: 2018-08-17

## 2018-08-17 DIAGNOSIS — G81.91 RIGHT HEMIPARESIS (HCC): ICD-10-CM

## 2018-08-17 DIAGNOSIS — I63.9 CEREBROVASCULAR ACCIDENT (CVA), UNSPECIFIED MECHANISM (HCC): Primary | ICD-10-CM

## 2018-08-17 PROCEDURE — 97110 THERAPEUTIC EXERCISES: CPT | Performed by: PHYSICAL THERAPIST

## 2018-08-17 PROCEDURE — 97116 GAIT TRAINING THERAPY: CPT | Performed by: PHYSICAL THERAPIST

## 2018-08-17 PROCEDURE — 97110 THERAPEUTIC EXERCISES: CPT

## 2018-08-17 NOTE — THERAPY PROGRESS REPORT/RE-CERT
Outpatient Physical Therapy Ortho Progress Note  [facility]     Patient Name: Modesta Kaur  : 1965  MRN: [id number]  Today's Date: 2018      Visit Date: 2018  Pt reports 0/10 pain pre treatment, 0/10 pain post treatment  Reports some% of improvement.  Attended 8/8 visits.  Insurance available:Precert after 20 visits   Next MD appt:TBD.  Recertification: 2018.  Patient Active Problem List   Diagnosis   • Type 2 diabetes mellitus without complication, without long-term current use of insulin (CMS/HCC)   • Mild intermittent asthma without complication   • Essential hypertension   • Seasonal allergic rhinitis due to pollen   • Need for hepatitis C screening test        Past Medical History:   Diagnosis Date   • Allergic rhinitis    • Asthma    • Cellulitis of other sites     right foot,resolved   • Cellulitis of other sites - right foot, resolved    • Cerebrovascular accident (CMS/HCC)    • Essential hypertension    • Other specified local infections of the skin and subcutaneous tissue - right foot.    • Rash and other nonspecific skin eruption    • Right hemiparesis (CMS/HCC)    • Streptococcal sore throat    • Stroke (CMS/HCC)    • Type 2 diabetes mellitus (CMS/HCC)    • URI (upper respiratory infection)         No past surgical history on file.    Visit Dx:     ICD-10-CM ICD-9-CM   1. Cerebrovascular accident (CVA), unspecified mechanism (CMS/HCC) I63.9 434.91   2. Right hemiparesis (CMS/HCC) G81.91 342.90                 PT Ortho     Row Name 18 0845       Subjective Comments    Subjective Comments --  -BS       Subjective Pain    Able to rate subjective pain? --  -BS    Pre-Treatment Pain Level --  -BS    Post-Treatment Pain Level --  -BS    Row Name 18 0800       Subjective Comments    Subjective Comments walking with more confidence, easing to manage use of hemiwalker, started using straight cane earlier this week.  -BS       Precautions and  Contraindications    Precautions/Limitations fall precautions  -BS    Precautions hypertonicity with R UE>LE; pt wears R AFO  -BS       Subjective Pain    Able to rate subjective pain? yes  -BS    Pre-Treatment Pain Level 0  -BS    Post-Treatment Pain Level 0  -BS       General Assessment (Manual Muscle Testing)    Comment, General Manual Muscle Testing (MMT) Assessment R LE-hip flex 3+/5 hip ext 3-/5 knee ext 3+/5 knee flex 2/5 RLE- hip ext 3+/5  -BS       Balance Skills Training    Balance Comments TUG test-35 sec, 35 sec (2 trials) w/ HW   -BS       Transfers    Sit-Stand [place] (Transfers) stand by assist;verbal cues;1 person assist  -BS    Stand-Sit [place] (Transfers) stand by assist;verbal cues  -BS    Comment (Transfers) Supervised supine to sit  -BS      User Key  (r) = Recorded By, (t) = Taken By, (c) = Cosigned By    Initials Name Provider Type    Matt Muñiz, PT Physical Therapist                      Therapy Education  Education Details: sc w/ gait  Given: Posture/body mechanics, Fall prevention and home safety, Mobility training  Program: Reinforced  How Provided: Verbal, Demonstration  Provided to: Patient, Caregiver           PT OP Goals     Row Name 08/17/18 0830 08/17/18 0800       PT Short Term Goals    STG Date to Achieve 08/07/18  -BS  --    STG 1 Patient/family independent with HEP.  -BS  --    STG 1 Progress Ongoing;Progressing  -BS  --    STG 2 Supervision supine <> sit  -BS  --    STG 2 Progress Met  -BS  --    STG 3 SBA sit to stand w/ AD  -BS  --    STG 3 Progress Met  -BS  --    STG 4 Gait training x 150 ft with hemiwalker (HW) w/ increased R knee flex throughout swing phase of gait cycle  -BS  --    STG 4 Progress Met  -BS  --       Long Term Goals    LTG Date to Achieve 08/21/18  -BS  --    LTG 1 Independent supine <> sit  -BS  --    LTG 1 Progress Progressing  -BS  --    LTG 1 Progress Comments Supervised  -BS  --    LTG 2 Supervision sit to stand with AD  -BS  --     LTG 2 Progress Progressing  -BS  --    LTG 2 Progress Comments SBA w/ vc's for hand and foot placement/sequencing  -BS  --    LTG 3 Gait training x 300ft+ with HW or LBQC with consistent (>75%) swing through gait pattern   -BS  --    LTG 3 Progress Ongoing;Progressing  -BS  --    LTG 4 Improve R LE MMT (hip flex, knee ext) to 4/5  -BS  --    LTG 4 Progress Met;Goal Revised  -BS  --    LTG 5 Improve R knee flex (hamstrings) MMT to 3+/5   -BS  --    LTG 5 Progress Met;Goal Revised  -BS  --    LTG 6 Perform TUG test with least restrictive AD in 30 sec or less  -BS  --    LTG 6 Progress New  -BS  --       Time Calculation    PT Goal Re-Cert Due Date 09/07/18  -BS 09/07/18  -BS      User Key  (r) = Recorded By, (t) = Taken By, (c) = Cosigned By    Initials Name Provider Type    BS Matt Miller, PT Physical Therapist                PT Assessment/Plan     Row Name 08/17/18 1200          PT Assessment    Functional Limitations Impaired gait;Performance in work activities;Performance in sport activities;Performance in self-care ADL;Performance in leisure activities;Limitations in community activities;Limitation in home management  -BS     Impairments Balance;Cognition;Coordination;Endurance;Gait;Sensation;Range of motion;Posture;Muscle strength;Motor function  -BS     Assessment Comments pt met most STG's and some LTG's, progressing well with stability with ambulation with HW and initial trials with SC, improved L LE MMT overall, especially hamstrings strength  -BS     Please refer to paper survey for additional self-reported information Yes  -BS     Rehab Potential Good  -BS     Patient/caregiver participated in establishment of treatment plan and goals Yes  -BS     Patient would benefit from skilled therapy intervention Yes  -BS        PT Plan    PT Frequency 2x/week  -BS     Predicted Duration of Therapy Intervention (Therapy Eval) 4-6 weeks  -BS     Planned CPT's? PT EVAL MOD COMPLELITY: 55407;PT RE-EVAL: 12528;PT THER  PROC EA 15 MIN: 63048;PT THER ACT EA 15 MIN: 80881;PT MANUAL THERAPY EA 15 MIN: 18984;PT NEUROMUSC RE-EDUCATION EA 15 MIN: 45235;PT GAIT TRAINING EA 15 MIN: 25987;PT ELECTRICAL STIM UNATTEND: ;PT HOT/COLD PACK WC NONMCARE: 80088;PT THER SUPP EA 15 MIN  -BS     Physical Therapy Interventions (Optional Details) balance training;aquatics exercise;gait training;home exercise program;joint mobilization;lumbar stabilization;manual therapy techniques;modalities;neuromuscular re-education;patient/family education;postural re-education;ROM (Range of Motion);stair training;strengthening;stretching;transfer training  -BS     PT Plan Comments continue ambulation with straight cane, address HS and L glut strengthening  -BS       User Key  (r) = Recorded By, (t) = Taken By, (c) = Cosigned By    Initials Name Provider Type    BS Matt Miller, PT Physical Therapist                  Exercises     Row Name 08/17/18 0935 08/17/18 0845 08/17/18 0800       Subjective Comments    Subjective Comments  -- --  -BS walking with more confidence, easing to manage use of hemiwalker, started using straight cane earlier this week.  -BS       Subjective Pain    Able to rate subjective pain?  -- --  -BS yes  -BS    Pre-Treatment Pain Level  -- --  -BS 0  -BS    Post-Treatment Pain Level  -- --  -BS 0  -BS       Aquatics    Aquatics performed? Yes  -TL  --  --       Exercise 1    Exercise Name 1 aqua: walk fwd/back/lateral  -TL ambulate w/ straight cane  -BS  --    Sets 1  -- 50 ft, 60 ft with CGA  -BS  --    Time 1 5 mins  -TL  --  --       Exercise 2    Exercise Name 2 aqua: march  -TL TUG test  -BS  --    Sets 2  -- 35 sec, 35 sec  -BS  --    Reps 2 20  -TL using HW  -BS  --       Exercise 3    Exercise Name 3 aqua: ham curls  -TL supine active resisted L hip ext  -BS  --    Sets 3  -- pushing against therapist  -BS  --    Reps 3 20  -TL 10  -BS  --       Exercise 4    Exercise Name 4 aqua: fwd SLR  -TL bridges  -BS  --    Sets 4  -- 1   -BS  --    Reps 4 20  -TL 10  -BS  --       Exercise 5    Exercise Name 5 aqua: side SLR  -TL supine <> sit  -BS  --    Sets 5  -- supervision  -BS  --    Reps 5 20  -TL  --  --       Exercise 6    Exercise Name 6 aqua: SLR ext  -TL sit <> stand  -BS  --    Sets 6  -- 2  -BS  --    Reps 6 20  -TL 5  -BS  --       Exercise 7    Exercise Name 7 Aqua: push and pull UE  -TL ambulate with HW x 230 ft  -BS  --    Sets 7  -- close CGA w/ vc's   -BS  --    Reps 7 20  -TL occasional foot drag  -BS  --       Exercise 8    Exercise Name 8 AQUA Stretches with right UE  -TL  --  --    Time 8 10mins  -TL  --  --       Exercise 9    Exercise Name 9 punches   -TL  --  --    Reps 9 15  -TL  --  --    Additional Comments assistance with RUE  -TL  --  --       Exercise 10    Exercise Name 10 STep downs with ecc control  -TL  --  --    Reps 10 20  -TL  --  --      User Key  (r) = Recorded By, (t) = Taken By, (c) = Cosigned By    Initials Name Provider Type    BS Matt Miller, PT Physical Therapist    TL Camilla Mccullough, PTA Physical Therapy Assistant                        Outcome Measure Options: Lower Extremity Functional Scale (LEFS)  Lower Extremity Functional Index  Any of your usual work, housework or school activities: Extreme difficulty or unable to perform activity  Your usual hobbies, recreational or sporting activities: Extreme difficulty or unable to perform activity  Getting into or out of the bath: Quite a bit of difficulty  Walking between rooms: A little bit of difficulty  Putting on your shoes or socks: Extreme difficulty or unable to perform activity  Squatting: Moderate difficulty  Lifting an object, like a bag of groceries from the floor: Extreme difficulty or unable to perform activity  Performing light activities around your home: Quite a bit of difficulty  Performing heavy activities around your home: Extreme difficulty or unable to perform activity  Getting into or out of a car: Moderate difficulty  Walking 2  blocks: Extreme difficulty or unable to perform activity  Walking a mile: Extreme difficulty or unable to perform activity  Going up or down 10 stairs (about 1 flight of stairs): Extreme difficulty or unable to perform activity  Standing for 1 hour: Extreme difficulty or unable to perform activity  Sitting for 1 hour: No difficulty  Running on even ground: Extreme difficulty or unable to perform activity  Running on uneven ground: Extreme difficulty or unable to perform activity  Making sharp turns while running fast: Extreme difficulty or unable to perform activity  Hopping: Extreme difficulty or unable to perform activity  Rolling over in bed: A little bit of difficulty  Total: 16      Time Calculation:     Therapy Suggested Charges     Code   Minutes Charges    None             Start Time: 0935  Stop Time: 1025  Time Calculation (min): 50 min  Total Timed Code Minutes- PT: 50 minute(s)     Therapy Charges for Today     Code Description Service Date Service Provider Modifiers Qty    39716700515 HC GAIT TRAINING EA 15 MIN 8/17/2018 Matt Miller, PT GP 1    59718270302 HC PT THER PROC EA 15 MIN 8/17/2018 Matt Miller, PT GP 2          PT G-Codes  Outcome Measure Options: Lower Extremity Functional Scale (LEFS)         Matt Miller PT  8/17/2018

## 2018-08-21 ENCOUNTER — HOSPITAL ENCOUNTER (OUTPATIENT)
Dept: PHYSICAL THERAPY | Facility: HOSPITAL | Age: 53
Setting detail: THERAPIES SERIES
Discharge: HOME | End: 2018-08-21

## 2018-08-21 DIAGNOSIS — G81.91 RIGHT HEMIPARESIS (HCC): ICD-10-CM

## 2018-08-21 DIAGNOSIS — I63.9 CEREBROVASCULAR ACCIDENT (CVA), UNSPECIFIED MECHANISM (HCC): Primary | ICD-10-CM

## 2018-08-21 PROCEDURE — 97110 THERAPEUTIC EXERCISES: CPT

## 2018-08-21 NOTE — THERAPY TREATMENT NOTE
"    Outpatient Physical Therapy Ortho Treatment Note  [facility]     Patient Name: Modesta Kaur  : 1965  MRN: [id number]  Today's Date: 2018      Visit Date: 2018  Pt reports 0/10 pain pre treatment, 0/10 pain post treatment  Reports \"stronger\"% of improvement.  Attended / visits.  Insurance available: precert after  visit  Next MD appt:TBD .  Recertification: 2018.  Visit Dx:    ICD-10-CM ICD-9-CM   1. Cerebrovascular accident (CVA), unspecified mechanism (CMS/HCC) I63.9 434.91   2. Right hemiparesis (CMS/HCC) G81.91 342.90       Patient Active Problem List   Diagnosis   • Type 2 diabetes mellitus without complication, without long-term current use of insulin (CMS/HCC)   • Mild intermittent asthma without complication   • Essential hypertension   • Seasonal allergic rhinitis due to pollen   • Need for hepatitis C screening test        Past Medical History:   Diagnosis Date   • Allergic rhinitis    • Asthma    • Cellulitis of other sites     right foot,resolved   • Cellulitis of other sites - right foot, resolved    • Cerebrovascular accident (CMS/HCC)    • Essential hypertension    • Other specified local infections of the skin and subcutaneous tissue - right foot.    • Rash and other nonspecific skin eruption    • Right hemiparesis (CMS/HCC)    • Streptococcal sore throat    • Stroke (CMS/HCC)    • Type 2 diabetes mellitus (CMS/HCC)    • URI (upper respiratory infection)         No past surgical history on file.          PT Ortho     Row Name 18 1015       Subjective Comments    Subjective Comments Pt reports that she has been doing her HEP.   -TL       Precautions and Contraindications    Precautions/Limitations fall precautions  -TL    Precautions hypertonicity with R UE>LE; pt wears R AFO  -TL       Subjective Pain    Able to rate subjective pain? no  -TL    Pre-Treatment Pain Level 0  -TL    Post-Treatment Pain Level 0  -TL      User Key  (r) = " Recorded By, (t) = Taken By, (c) = Cosigned By    Initials Name Provider Type    Camilla Bennett PTA Physical Therapy Assistant                            PT Assessment/Plan     Row Name 08/21/18 1300          PT Assessment    Assessment Comments No new goals met but progressing well with safety. Pt able to walk with straight cane SBA of 1 few cues without LOB noted. Pt reports that she has been doing her HEP. Pt able to get into QP position this date without much of a struggle.  -TL        PT Plan    PT Frequency 2x/week   Pool/aquatic  -TL     PT Plan Comments give pt pulleys  -TL       User Key  (r) = Recorded By, (t) = Taken By, (c) = Cosigned By    Initials Name Provider Type    Camilla Bennett PTA Physical Therapy Assistant                    Exercises     Row Name 08/21/18 1015             Subjective Comments    Subjective Comments Pt reports that she has been doing her HEP.   -TL         Subjective Pain    Able to rate subjective pain? no  -TL      Pre-Treatment Pain Level 0  -TL      Post-Treatment Pain Level 0  -TL         Aquatics    Aquatics performed? Yes  -TL         Exercise 1    Exercise Name 1 Pro ll UE/LE  -TL      Additional Comments pt needs assistance with UE and wrap leg to keep in stirup  -TL         Exercise 2    Exercise Name 2 Gait : St cane  -TL      Cueing 2 Verbal   take longer stridel length on right  -TL      Additional Comments SBA of 1 100 feet  -TL         Exercise 3    Exercise Name 3 Bridges  -TL      Reps 3 20  -TL      Time 3 5 sec hold  -TL         Exercise 4    Exercise Name 4 Prone SLR  -TL      Sets 4 2  -TL      Reps 4 10  -TL         Exercise 5    Exercise Name 5 Prone ham curls  -TL      Sets 5 2  -TL      Reps 5 10  -TL         Exercise 6    Exercise Name 6 KULDIP  -TL      Time 6 3 mins  -TL         Exercise 7    Exercise Name 7 QP  -TL      Time 7 1 min  -TL         Exercise 8    Exercise Name 8 Pulleys 2 way  -TL      Reps 8 20  -TL      Additional Comments  flex/abd  -TL         Exercise 9    Exercise Name 9 AQUA;fwd/back/lateral  -TL      Time 9 5 mins each direction  -TL         Exercise 10    Exercise Name 10 AQUA;march  -TL      Reps 10 20  -TL         Exercise 11    Exercise Name 11 AQUA:ham curls  -TL      Reps 11 20  -TL         Exercise 12    Exercise Name 12 AQUA: SLR fwd  -TL      Reps 12 20  -TL      Additional Comments sm float  -TL         Exercise 13    Exercise Name 13 AQUA; SLR back  -TL      Reps 13 20  -TL      Additional Comments sm float  -TL         Exercise 14    Exercise Name 14 AQUA: SLR ext  -TL      Reps 14 20  -TL         Exercise 15    Exercise Name 15 attempted heel to toe gait  -TL      Time 15 1lap  -TL         Exercise 16    Exercise Name 16 Trunk rotation using board  -TL      Reps 16 20  -TL         Exercise 17    Exercise Name 17 hands clasped swing side to side   -TL      Reps 17 15  -TL         Exercise 18    Exercise Name 18 AQUA: stretches  -TL      Time 18 10mins  -TL      Additional Comments flex/abd shld ,wrist,   -TL         Exercise 19    Exercise Name 19 Aqua: ecc step down  -TL      Reps 19 20  -TL        User Key  (r) = Recorded By, (t) = Taken By, (c) = Cosigned By    Initials Name Provider Type    TL Camilla Mccullough PTA Physical Therapy Assistant                                            Time Calculation:   Start Time: 1019  Stop Time: 1200  Time Calculation (min): 101 min  PT Non-Billable Time (min): 5 min  Total Timed Code Minutes- PT: 96 minute(s)  Therapy Suggested Charges     Code   Minutes Charges    None           Therapy Charges for Today     Code Description Service Date Service Provider Modifiers Qty    23200209191 HC PT THER PROC EA 15 MIN 8/21/2018 Camilla Mccullough PTA GP 6                    Camilla Mccullough PTA  8/21/2018

## 2018-08-24 ENCOUNTER — HOSPITAL ENCOUNTER (OUTPATIENT)
Dept: PHYSICAL THERAPY | Facility: HOSPITAL | Age: 53
Setting detail: THERAPIES SERIES
Discharge: HOME | End: 2018-08-24

## 2018-08-24 DIAGNOSIS — I63.9 CEREBROVASCULAR ACCIDENT (CVA), UNSPECIFIED MECHANISM (HCC): Primary | ICD-10-CM

## 2018-08-24 DIAGNOSIS — G81.91 RIGHT HEMIPARESIS (HCC): ICD-10-CM

## 2018-08-24 PROCEDURE — 97110 THERAPEUTIC EXERCISES: CPT

## 2018-08-24 NOTE — THERAPY TREATMENT NOTE
"    Outpatient Physical Therapy Ortho Treatment Note  [facility]     Patient Name: Modesta Kaur  : 1965  MRN: [id number]  Today's Date: 2018      Visit Date: 2018  Pt reports 0/10 pain pre treatment, 0/10 pain post treatment  Reports \"stronger\"% of improvement.  Attended 10/10 visits.  Insurance available:precert after  visit   Next MD appt: TBD .  Recertification: 2018.  Visit Dx:    ICD-10-CM ICD-9-CM   1. Cerebrovascular accident (CVA), unspecified mechanism (CMS/HCC) I63.9 434.91   2. Right hemiparesis (CMS/HCC) G81.91 342.90       Patient Active Problem List   Diagnosis   • Type 2 diabetes mellitus without complication, without long-term current use of insulin (CMS/HCC)   • Mild intermittent asthma without complication   • Essential hypertension   • Seasonal allergic rhinitis due to pollen   • Need for hepatitis C screening test        Past Medical History:   Diagnosis Date   • Allergic rhinitis    • Asthma    • Cellulitis of other sites     right foot,resolved   • Cellulitis of other sites - right foot, resolved    • Cerebrovascular accident (CMS/HCC)    • Essential hypertension    • Other specified local infections of the skin and subcutaneous tissue - right foot.    • Rash and other nonspecific skin eruption    • Right hemiparesis (CMS/HCC)    • Streptococcal sore throat    • Stroke (CMS/HCC)    • Type 2 diabetes mellitus (CMS/HCC)    • URI (upper respiratory infection)         No past surgical history on file.          PT Ortho     Row Name 18 1000       Precautions and Contraindications    Precautions/Limitations fall precautions  -TL    Precautions hypertonicity with R UE>LE; pt wears R AFO  -TL       Subjective Pain    Able to rate subjective pain? yes  -TL    Pre-Treatment Pain Level 0  -TL    Post-Treatment Pain Level 0  -TL      User Key  (r) = Recorded By, (t) = Taken By, (c) = Cosigned By    Initials Name Provider Type    TL Lantrip Camilla " MAN PTA Physical Therapy Assistant                            PT Assessment/Plan     Row Name 08/24/18 1200          PT Assessment    Assessment Comments Pt has met all short term goals this date and 1 and 2 long term goals. Pt progressing well. Pt ready for straight cane with supervision a of 1. Pt walking out of hemiwalker.   -TL        PT Plan    PT Frequency 2x/week   pool/land  -TL     PT Plan Comments give pulleys and teach how to get up from the floor  -TL       User Key  (r) = Recorded By, (t) = Taken By, (c) = Cosigned By    Initials Name Provider Type    TL Camilla Mccullough PTA Physical Therapy Assistant                    Exercises     Row Name 08/24/18 1000             Subjective Comments    Subjective Comments Pt became tearful in pool again today. pt states that she cries alot. Sister stated that she is not on an anti-depressant medication. PTA suggested that they talk to her family doctor concerning her crying spells.  -TL         Subjective Pain    Able to rate subjective pain? yes  -TL      Pre-Treatment Pain Level 0  -TL      Post-Treatment Pain Level 0  -TL         Exercise 1    Exercise Name 1 Gait with sc  -TL      Reps 1 100 feet  -TL      Additional Comments SBA x1  -TL         Exercise 2    Exercise Name 2 steps 3 steps with cane  -TL      Reps 2 10 reps  -TL         Exercise 3    Exercise Name 3 step down ecc  -TL      Reps 3 15  -TL         Exercise 4    Exercise Name 4 step lateral ecc  -TL      Reps 4 15 reps  -TL         Exercise 5    Exercise Name 5 Tug test  -TL      Additional Comments 35 sec,31 sec x2 cues reach back for chair before sitting  -TL         Exercise 6    Exercise Name 6 supine to sit  -TL      Sets 6 5 reps  -TL      Additional Comments both sides  -TL         Exercise 7    Exercise Name 7 Aqua: walk fwd/back/lateral  -TL      Time 7 5 mins each directions  -TL      Additional Comments without support  -TL         Exercise 8    Exercise Name 8 aqua; march  -TL      Reps  8 20  -TL         Exercise 9    Exercise Name 9 aqua: ms  -TL      Reps 9 20  -TL         Exercise 10    Exercise Name 10 aqua: 3-way SLR  -TL      Reps 10 20  -TL         Exercise 11    Exercise Name 11 aqua: ham curls set  -TL      Reps 11 20  -TL         Exercise 12    Exercise Name 12 right arm stretches  -TL      Time 12 10 mins  -TL        User Key  (r) = Recorded By, (t) = Taken By, (c) = Cosigned By    Initials Name Provider Type    Camilla Bennett PTA Physical Therapy Assistant                               PT OP Goals     Row Name 08/24/18 1000          PT Short Term Goals    STG Date to Achieve 08/07/18  -TL     STG 1 Patient/family independent with HEP.  -TL     STG 1 Progress Met;Ongoing  -TL     STG 2 Supervision supine <> sit  -TL     STG 2 Progress Met  -TL     STG 3 SBA sit to stand w/ AD  -TL     STG 3 Progress Met  -TL     STG 4 Gait training x 150 ft with hemiwalker (HW) w/ increased R knee flex throughout swing phase of gait cycle  -TL     STG 4 Progress Met  -TL        Long Term Goals    LTG Date to Achieve 08/21/18  -TL     LTG 1 Independent supine <> sit  -TL     LTG 1 Progress Met  -TL     LTG 2 Supervision sit to stand with AD  -TL     LTG 2 Progress Met  -TL     LTG 3 Gait training x 300ft+ with HW or LBQC with consistent (>75%) swing through gait pattern   -TL     LTG 3 Progress Ongoing;Progressing  -TL     LTG 4 Improve R LE MMT (hip flex, knee ext) to 4/5  -TL     LTG 4 Progress Met;Goal Revised  -TL     LTG 5 Improve R knee flex (hamstrings) MMT to 3+/5   -TL     LTG 5 Progress Met;Goal Revised  -TL     LTG 6 Perform TUG test with least restrictive AD in 30 sec or less  -TL     LTG 6 Progress Ongoing;Progressing  -TL     LTG 6 Progress Comments 35 sec, 31 sec x2 with cues to reach back for charir  -TL        Time Calculation    PT Goal Re-Cert Due Date 09/07/18  -TL       User Key  (r) = Recorded By, (t) = Taken By, (c) = Cosigned By    Initials Name Provider Type    PAUL Mccullough  Camilla CONWAY PTA Physical Therapy Assistant          Therapy Education  Education Details: educated pt/caregiver on getting up from bed from weak side.  Given: HEP, Posture/body mechanics, Fall prevention and home safety, Mobility training  Program: New  How Provided: Verbal, Demonstration  Provided to: Patient, Caregiver              Time Calculation:   Start Time: 1018  Stop Time: 1146  Time Calculation (min): 88 min  Total Timed Code Minutes- PT: 88 minute(s)  Therapy Suggested Charges     Code   Minutes Charges    None           Therapy Charges for Today     Code Description Service Date Service Provider Modifiers Qty    39509411748 HC PT THER PROC EA 15 MIN 8/24/2018 Camilla Mccullough PTA GP 6                    Camilla Mccullough PTA  8/24/2018

## 2018-08-28 ENCOUNTER — APPOINTMENT (OUTPATIENT)
Dept: PHYSICAL THERAPY | Facility: HOSPITAL | Age: 53
End: 2018-08-28

## 2018-08-28 ENCOUNTER — OFFICE VISIT (OUTPATIENT)
Dept: FAMILY MEDICINE CLINIC | Facility: CLINIC | Age: 53
End: 2018-08-28

## 2018-08-28 VITALS
RESPIRATION RATE: 20 BRPM | HEIGHT: 60 IN | DIASTOLIC BLOOD PRESSURE: 60 MMHG | TEMPERATURE: 98 F | HEART RATE: 110 BPM | SYSTOLIC BLOOD PRESSURE: 95 MMHG | OXYGEN SATURATION: 98 %

## 2018-08-28 DIAGNOSIS — R19.7 DIARRHEA, UNSPECIFIED TYPE: Primary | ICD-10-CM

## 2018-08-28 PROCEDURE — 85025 COMPLETE CBC W/AUTO DIFF WBC: CPT | Performed by: NURSE PRACTITIONER

## 2018-08-28 PROCEDURE — 99214 OFFICE O/P EST MOD 30 MIN: CPT | Performed by: NURSE PRACTITIONER

## 2018-08-28 PROCEDURE — 85007 BL SMEAR W/DIFF WBC COUNT: CPT | Performed by: NURSE PRACTITIONER

## 2018-08-28 PROCEDURE — 36415 COLL VENOUS BLD VENIPUNCTURE: CPT | Performed by: NURSE PRACTITIONER

## 2018-08-28 RX ORDER — LOPERAMIDE HYDROCHLORIDE 2 MG/1
2 TABLET ORAL 4 TIMES DAILY PRN
Qty: 30 TABLET | Refills: 3 | Status: SHIPPED | OUTPATIENT
Start: 2018-08-28 | End: 2019-05-03

## 2018-08-29 LAB
BASOPHILS # BLD AUTO: 0.04 10*3/MM3 (ref 0–0.2)
BASOPHILS NFR BLD AUTO: 0.4 % (ref 0–2)
DEPRECATED RDW RBC AUTO: 46.3 FL (ref 36.4–46.3)
EOSINOPHIL # BLD AUTO: 0.03 10*3/MM3 (ref 0–0.7)
EOSINOPHIL NFR BLD AUTO: 0.3 % (ref 0–7)
ERYTHROCYTE [DISTWIDTH] IN BLOOD BY AUTOMATED COUNT: 14.6 % (ref 11.5–14.5)
HCT VFR BLD AUTO: 40.5 % (ref 35–45)
HGB BLD-MCNC: 13.6 G/DL (ref 12–15.5)
IMM GRANULOCYTES # BLD: 0.14 10*3/MM3 (ref 0–0.02)
IMM GRANULOCYTES NFR BLD: 1.3 % (ref 0–0.5)
LYMPHOCYTES # BLD AUTO: 1.27 10*3/MM3 (ref 0.6–4.2)
LYMPHOCYTES NFR BLD AUTO: 12.2 % (ref 10–50)
MCH RBC QN AUTO: 28.9 PG (ref 26.5–34)
MCHC RBC AUTO-ENTMCNC: 33.6 G/DL (ref 31.4–36)
MCV RBC AUTO: 86 FL (ref 80–98)
MONOCYTES # BLD AUTO: 0.52 10*3/MM3 (ref 0–0.9)
MONOCYTES NFR BLD AUTO: 5 % (ref 0–12)
NEUTROPHILS # BLD AUTO: 8.39 10*3/MM3 (ref 2–8.6)
NEUTROPHILS NFR BLD AUTO: 80.8 % (ref 37–80)
PLATELET # BLD AUTO: 534 10*3/MM3 (ref 150–450)
PMV BLD AUTO: 10.1 FL (ref 8–12)
RBC # BLD AUTO: 4.71 10*6/MM3 (ref 3.77–5.16)
RBC MORPH BLD: NORMAL
SMALL PLATELETS BLD QL SMEAR: NORMAL
WBC MORPH BLD: NORMAL
WBC NRBC COR # BLD: 10.39 10*3/MM3 (ref 3.2–9.8)

## 2018-08-30 ENCOUNTER — APPOINTMENT (OUTPATIENT)
Dept: PHYSICAL THERAPY | Facility: HOSPITAL | Age: 53
End: 2018-08-30

## 2018-08-30 ENCOUNTER — TELEPHONE (OUTPATIENT)
Dept: FAMILY MEDICINE CLINIC | Facility: CLINIC | Age: 53
End: 2018-08-30

## 2018-08-30 NOTE — TELEPHONE ENCOUNTER
----- Message from JESUS Tellez sent at 8/30/2018  8:35 AM CDT -----  Call her and let her know that it looks like the diarrhea might be due to an infection.  Call and see how she is doing.

## 2018-08-30 NOTE — PROGRESS NOTES
Subjective   Modesta Kaur is a 52 y.o. female.     Here today with c/o diarrhea for the past few days.  Appetite is off and b/p has been running low with a fairly high pulse rate.  She has been drinking fluids, but not eating solid food since the diarrhea began.  Is taking no med for sx.      Diarrhea    This is a new problem. The current episode started in the past 7 days. The problem occurs 5 to 10 times per day. The problem has been unchanged. The stool consistency is described as watery. The patient states that diarrhea does not awaken her from sleep. Pertinent negatives include no abdominal pain, arthralgias, bloating, chills, coughing, fever, headaches, increased  flatus, myalgias, sweats, URI, vomiting or weight loss. Nothing aggravates the symptoms. Risk factors include recent hospitalization and recent antibiotic use. She has tried nothing for the symptoms. The treatment provided no relief.        The following portions of the patient's history were reviewed and updated as appropriate: allergies, current medications, past family history, past medical history, past social history, past surgical history and problem list.    Review of Systems   Constitutional: Negative.  Negative for chills, fever and unexpected weight loss.   HENT: Negative.    Respiratory: Negative.  Negative for cough.    Cardiovascular: Negative.    Gastrointestinal: Positive for diarrhea. Negative for abdominal pain, bloating, flatus and vomiting.   Musculoskeletal: Negative for arthralgias and myalgias.        Wheelchair bound   Skin: Negative.    Neurological:        S/p cva   Psychiatric/Behavioral: Negative.        Objective   Physical Exam   Constitutional: She is oriented to person, place, and time. She appears well-developed and well-nourished. No distress.   HENT:   Head: Normocephalic.   Eyes: Pupils are equal, round, and reactive to light.   Neck: Normal range of motion. Neck supple. No thyromegaly present.   Cardiovascular:  Normal rate, regular rhythm and normal heart sounds.  Exam reveals no friction rub.    No murmur heard.  Pulmonary/Chest: Effort normal and breath sounds normal. No respiratory distress. She has no wheezes. She has no rales.   Abdominal: Soft. Bowel sounds are normal. She exhibits no distension and no mass. There is no tenderness. There is no guarding.   Neurological: She is alert and oriented to person, place, and time.   Skin: Skin is warm and dry.   Psychiatric: She has a normal mood and affect. Thought content normal.   Nursing note and vitals reviewed.        Assessment/Plan   Modesta was seen today for high pulse and anorexia.    Diagnoses and all orders for this visit:    Diarrhea, unspecified type  -     loperamide (IMODIUM A-D) 2 MG tablet; Take 1 tablet by mouth 4 (Four) Times a Day As Needed for Diarrhea.  -     CBC & Differential  -     CBC Auto Differential  -     Scan Slide; Future  -     Scan Slide    cont with fluids until the diarrhea is improved.  Alaina in 2 days time.

## 2018-08-30 NOTE — PROGRESS NOTES
Call her and let her know that it looks like the diarrhea might be due to an infection.  Call and see how she is doing.

## 2018-08-31 ENCOUNTER — TELEPHONE (OUTPATIENT)
Dept: FAMILY MEDICINE CLINIC | Facility: CLINIC | Age: 53
End: 2018-08-31

## 2018-09-04 ENCOUNTER — APPOINTMENT (OUTPATIENT)
Dept: PHYSICAL THERAPY | Facility: HOSPITAL | Age: 53
End: 2018-09-04

## 2018-09-06 ENCOUNTER — DOCUMENTATION (OUTPATIENT)
Dept: PHYSICAL THERAPY | Facility: HOSPITAL | Age: 53
End: 2018-09-06

## 2018-09-06 DIAGNOSIS — I63.9 CEREBROVASCULAR ACCIDENT (CVA), UNSPECIFIED MECHANISM (HCC): Primary | ICD-10-CM

## 2018-09-06 DIAGNOSIS — G81.91 RIGHT HEMIPARESIS (HCC): ICD-10-CM

## 2018-09-07 ENCOUNTER — APPOINTMENT (OUTPATIENT)
Dept: PHYSICAL THERAPY | Facility: HOSPITAL | Age: 53
End: 2018-09-07

## 2018-09-07 ENCOUNTER — TELEPHONE (OUTPATIENT)
Dept: FAMILY MEDICINE CLINIC | Facility: CLINIC | Age: 53
End: 2018-09-07

## 2018-09-14 ENCOUNTER — APPOINTMENT (OUTPATIENT)
Dept: PHYSICAL THERAPY | Facility: HOSPITAL | Age: 53
End: 2018-09-14

## 2018-10-15 DIAGNOSIS — E11.9 TYPE 2 DIABETES MELLITUS WITHOUT COMPLICATION, WITHOUT LONG-TERM CURRENT USE OF INSULIN (HCC): Primary | ICD-10-CM

## 2018-10-24 ENCOUNTER — LAB REQUISITION (OUTPATIENT)
Dept: LAB | Facility: HOSPITAL | Age: 53
End: 2018-10-24

## 2018-10-24 DIAGNOSIS — E11.9 TYPE 2 DIABETES MELLITUS WITHOUT COMPLICATIONS (HCC): ICD-10-CM

## 2018-10-24 LAB — HBA1C MFR BLD: 5.8 % (ref 4–5.6)

## 2018-10-24 PROCEDURE — 83036 HEMOGLOBIN GLYCOSYLATED A1C: CPT | Performed by: FAMILY MEDICINE

## 2018-10-25 DIAGNOSIS — G81.91 RIGHT HEMIPARESIS (HCC): ICD-10-CM

## 2018-10-25 DIAGNOSIS — I63.9 CEREBROVASCULAR ACCIDENT (CVA), UNSPECIFIED MECHANISM (HCC): Primary | ICD-10-CM

## 2018-10-31 ENCOUNTER — APPOINTMENT (OUTPATIENT)
Dept: PHYSICAL THERAPY | Facility: HOSPITAL | Age: 53
End: 2018-10-31

## 2018-11-01 ENCOUNTER — OFFICE VISIT (OUTPATIENT)
Dept: FAMILY MEDICINE CLINIC | Facility: CLINIC | Age: 53
End: 2018-11-01

## 2018-11-01 VITALS
OXYGEN SATURATION: 98 % | WEIGHT: 151 LBS | HEIGHT: 60 IN | DIASTOLIC BLOOD PRESSURE: 91 MMHG | BODY MASS INDEX: 29.64 KG/M2 | RESPIRATION RATE: 20 BRPM | SYSTOLIC BLOOD PRESSURE: 141 MMHG | TEMPERATURE: 98.7 F | HEART RATE: 94 BPM

## 2018-11-01 DIAGNOSIS — I63.9 CEREBROVASCULAR ACCIDENT (CVA), UNSPECIFIED MECHANISM (HCC): Primary | ICD-10-CM

## 2018-11-01 PROCEDURE — 99213 OFFICE O/P EST LOW 20 MIN: CPT | Performed by: NURSE PRACTITIONER

## 2018-11-01 NOTE — PROGRESS NOTES
Subjective   Modesta Kaur is a 52 y.o. female.     Here today for zhanna.  She has had a cva which has left her with right sided weakness.  Is starting to improve, loraine lower ext.  She has finished with home health pt and will be receiving pt here at [facility].  She is in need of a brace for her right ankle and foot to help with her foot drop.  Also she is in need of a brace to help with contractures of her right hand.  She has been working with hilda in physical therapy.  She needs orders for these things.      Cerebrovascular Accident   This is a recurrent problem. The current episode started more than 1 month ago. The problem occurs constantly. The problem has been gradually improving. Associated symptoms include arthralgias, fatigue, myalgias, numbness and weakness. Pertinent negatives include no abdominal pain, anorexia, change in bowel habit, chest pain, chills, congestion, coughing, diaphoresis, fever, headaches, joint swelling, nausea, neck pain, rash, sore throat, swollen glands, urinary symptoms, vertigo, visual change or vomiting. The symptoms are aggravated by exertion, walking and standing. Treatments tried: pt and ot. The treatment provided significant relief.        The following portions of the patient's history were reviewed and updated as appropriate: allergies, current medications, past family history, past medical history, past social history, past surgical history and problem list.    Review of Systems   Constitutional: Positive for fatigue. Negative for chills, diaphoresis and fever.   HENT: Negative.  Negative for congestion and sore throat.    Respiratory: Negative.  Negative for cough.    Cardiovascular: Negative.  Negative for chest pain.   Gastrointestinal: Negative for abdominal pain, anorexia, change in bowel habit, nausea and vomiting.   Musculoskeletal: Positive for arthralgias and myalgias. Negative for joint swelling and neck pain.   Skin: Negative.  Negative for rash.   Neurological:  Positive for weakness and numbness. Negative for vertigo.   Psychiatric/Behavioral: Negative.        Objective   Physical Exam   Constitutional: She is oriented to person, place, and time. She appears well-developed and well-nourished. No distress.   HENT:   Head: Normocephalic.   Eyes: Pupils are equal, round, and reactive to light.   Neck: Normal range of motion. Neck supple.   Cardiovascular: Normal rate, regular rhythm and normal heart sounds.  Exam reveals no friction rub.    No murmur heard.  Pulmonary/Chest: Effort normal and breath sounds normal. No respiratory distress. She has no wheezes. She has no rales.   Abdominal: Soft.   Musculoskeletal:   Cont to be weak on her right side, loraine upper ext.  Is improved with lower ext strength.   Neurological: She is alert and oriented to person, place, and time.   Skin: Skin is warm and dry.   Psychiatric: She has a normal mood and affect. Thought content normal.   Nursing note and vitals reviewed.        Assessment/Plan   Modesta was seen today for cerebrovascular accident.    Diagnoses and all orders for this visit:    Cerebrovascular accident (CVA), unspecified mechanism (CMS/HCC)  Comments:  will call pt about the braces and get them ordered.

## 2018-11-06 ENCOUNTER — HOSPITAL ENCOUNTER (OUTPATIENT)
Dept: PHYSICAL THERAPY | Facility: HOSPITAL | Age: 53
Setting detail: THERAPIES SERIES
Discharge: HOME | End: 2018-11-06

## 2018-11-06 DIAGNOSIS — I63.9 CEREBROVASCULAR ACCIDENT (CVA), UNSPECIFIED MECHANISM (HCC): Primary | ICD-10-CM

## 2018-11-06 DIAGNOSIS — G81.91 RIGHT HEMIPARESIS (HCC): ICD-10-CM

## 2018-11-06 PROCEDURE — 97162 PT EVAL MOD COMPLEX 30 MIN: CPT | Performed by: PHYSICAL THERAPIST

## 2018-11-06 PROCEDURE — 97110 THERAPEUTIC EXERCISES: CPT | Performed by: PHYSICAL THERAPIST

## 2018-11-07 NOTE — THERAPY EVALUATION
Outpatient Physical Therapy Ortho Initial Evaluation  [facility]     Patient Name: Modesta Kaur  : 1965  MRN: [id number]  Today's Date: 2018      Visit Date: 2018  Visit   Return to MD: QUIN  Re-cert date: 18    Patient Active Problem List   Diagnosis   • Type 2 diabetes mellitus without complication, without long-term current use of insulin (CMS/HCC)   • Mild intermittent asthma without complication   • Essential hypertension   • Seasonal allergic rhinitis due to pollen   • Need for hepatitis C screening test   • Cerebrovascular accident (CMS/HCC)        Past Medical History:   Diagnosis Date   • Allergic rhinitis    • Asthma    • Cellulitis of other sites     right foot,resolved   • Cellulitis of other sites - right foot, resolved    • Cerebrovascular accident (CMS/HCC)    • Essential hypertension    • Other specified local infections of the skin and subcutaneous tissue - right foot.    • Rash and other nonspecific skin eruption    • Right hemiparesis (CMS/HCC)    • Streptococcal sore throat    • Stroke (CMS/HCC)    • Type 2 diabetes mellitus (CMS/HCC)    • URI (upper respiratory infection)         History reviewed. No pertinent surgical history.    Visit Dx:     ICD-10-CM ICD-9-CM   1. Cerebrovascular accident (CVA), unspecified mechanism (CMS/HCC) I63.9 434.91   2. Right hemiparesis (CMS/HCC) G81.91 342.90     Medications (Admitted on 2018)     amLODIPine (NORVASC) 10 MG tablet     atorvastatin (LIPITOR) 40 MG tablet     Blood Glucose Monitoring Suppl w/Device kit     cetirizine (zyrTEC) 10 MG tablet     clopidogrel (PLAVIX) 75 MG tablet     cyproheptadine (PERIACTIN) 4 MG tablet     ferrous sulfate 325 (65 FE) MG tablet     glucose blood test strip     Lancets 30G misc     loperamide (IMODIUM A-D) 2 MG tablet     losartan (COZAAR) 100 MG tablet     mesalamine (DELZICOL) 400 MG capsule delayed-release delayed release capsule     metFORMIN (GLUCOPHAGE) 500  MG tablet     omeprazole (priLOSEC) 20 MG capsule     potassium chloride (K-DUR) 10 MEQ CR tablet     predniSONE (DELTASONE) 10 MG tablet     Probiotic Product (PROBIOTIC-10 PO)     spironolactone (ALDACTONE) 25 MG tablet     tiZANidine (ZANAFLEX) 4 MG tablet     vancomycin (VANCOCIN) 125 MG capsule     vitamin D (ERGOCALCIFEROL) 41270 units capsule capsule        Allergies: lisinopril, triamterene          PT Ortho     Row Name 11/06/18 1000       Subjective Comments    Subjective Comments 51 yo female readmitted s/p CVA with R hemiparesis, CVA on 3/31/18, received skilled PT earlier this year, with last session on 8/24/18. Pt admits stop coming coming due to an illness at the time. Now back to resume skilled PT to address deficits with recent CVA.  -BS       Precautions and Contraindications    Precautions/Limitations fall precautions  -BS    Precautions hypertonicity with R UE>LE; pt wears R AFO  -BS       Subjective Pain    Able to rate subjective pain? yes  -BS    Pre-Treatment Pain Level 0  -BS    Post-Treatment Pain Level 0  -BS       General ROM    GENERAL ROM COMMENTS R UE-limited R wrist ext and R elbow ext to PROM, R/L LE grossly WFL   -BS       MMT (Manual Muscle Testing)    General MMT Comments MMT: R UE-5/5 except 2-/5 biceps/triceps, R LE-hip flex 3-/5 knee ext 3-/5 knee flex 3-/5 ankle 0/5 L LE 5/5  -BS       Sensation    Light Touch Severe deficits in the RLE;Severe deficits in the RUE  -BS    Additional Comments hypertonicity in R elbow and R knee, multibeat clonus with R ankle  -BS       Gait/Stairs Assessment/Training    Comment (Gait/Stairs) SBA/supervised x 50 ft with NBQC, slow step to gait pattern, wide MICHELLE, R hip in excessive ER throughout gait cycle.   -BS      User Key  (r) = Recorded By, (t) = Taken By, (c) = Cosigned By    Initials Name Provider Type    Matt Muñiz, PT Physical Therapist                      Therapy Education  Education Details: HEP: prone HSC, sidelying R knee  flex/ext  Given: HEP  Program: New  How Provided: Verbal, Demonstration  Provided to: Patient           PT OP Goals     Row Name 11/06/18 1000          PT Short Term Goals    STG Date to Achieve 11/27/18  -BS     STG 1 Pt indep with HEP for R LE strengthening and balance  -BS     STG 1 Progress New  -BS     STG 2 Improve R LE MMT (R knee flex/ext, hip flex) by 1/2 to 1 muscle grade  -BS     STG 2 Progress New  -BS     STG 3 Improve TUG test to 36 sec or less with NBQC  -BS     STG 3 Progress New  -BS        Time Calculation    PT Goal Re-Cert Due Date 11/27/18  -BS       User Key  (r) = Recorded By, (t) = Taken By, (c) = Cosigned By    Initials Name Provider Type    BS Matt Miller, PT Physical Therapist                PT Assessment/Plan     Row Name 11/06/18 1015          PT Assessment    Functional Limitations Impaired gait;Performance in work activities;Performance in sport activities;Performance in self-care ADL;Performance in leisure activities;Limitations in community activities;Limitation in home management  -BS     Impairments Balance;Cognition;Coordination;Endurance;Gait;Sensation;Range of motion;Posture;Muscle strength;Motor function  -BS     Assessment Comments pt limited by severe R hemiparesis, right sided hypertonicity and right sided neglect. Would benefit from skilled PT to address above stated deficits.  -BS     Please refer to paper survey for additional self-reported information No  -BS     Rehab Potential Fair  -BS     Patient/caregiver participated in establishment of treatment plan and goals Yes  -BS     Patient would benefit from skilled therapy intervention Yes  -BS        PT Plan    PT Frequency 2x/week  -BS     Predicted Duration of Therapy Intervention (Therapy Eval) 2 weeks  -BS     Planned CPT's? PT EVAL MOD COMPLELITY: 55728;PT RE-EVAL: 27784;PT THER PROC EA 15 MIN: 04834;PT THER ACT EA 15 MIN: 22053;PT MANUAL THERAPY EA 15 MIN: 45234;PT NEUROMUSC RE-EDUCATION EA 15 MIN: 13177;PT GAIT  TRAINING EA 15 MIN: 73442;PT ELECTRICAL STIM UNATTEND: ;PT THER SUPP EA 15 MIN  -BS     Physical Therapy Interventions (Optional Details) balance training;gait training;home exercise program;joint mobilization;manual therapy techniques;modalities;neuromuscular re-education;patient/family education;postural re-education;ROM (Range of Motion);stair training;strengthening;stretching;transfer training  -BS     PT Plan Comments continue w/ HS and R hip flex strengthening. Attempt to lengthen stride length w/ use of AD w/ ambulation  -BS       User Key  (r) = Recorded By, (t) = Taken By, (c) = Cosigned By    Initials Name Provider Type    Matt Muñiz, PT Physical Therapist                  Exercises     Row Name 11/06/18 1000             Subjective Comments    Subjective Comments 51 yo female readmitted s/p CVA with R hemiparesis, CVA on 3/31/18, received skilled PT earlier this year, with last session on 8/24/18. Pt admits stop coming coming due to an illness at the time. Now back to resume skilled PT to address deficits with recent CVA.  -BS         Subjective Pain    Able to rate subjective pain? yes  -BS      Pre-Treatment Pain Level 0  -BS      Post-Treatment Pain Level 0  -BS         Exercise 1    Exercise Name 1 S/L right knee flex/ext AAROM (on slide board)  -BS      Sets 1 1  -BS      Reps 1 10  -BS         Exercise 2    Exercise Name 2 bridges w/ iso B hip add  -BS      Sets 2 1  -BS      Reps 2 10  -BS         Exercise 3    Exercise Name 3 prone HSC  -BS      Sets 3 1  -BS      Reps 3 10 on R (AAROM)  -BS         Exercise 4    Exercise Name 4 TUG  -BS      Reps 4 41 sec with NBQC  -BS        User Key  (r) = Recorded By, (t) = Taken By, (c) = Cosigned By    Initials Name Provider Type    Matt Muñiz, PT Physical Therapist                        Outcome Measure Options: Timed Up and Go (TUG)  Timed Up and Go (TUG)  TUG Test 1:  (41 sec w/ NBQC)      Time Calculation:     Therapy Suggested Charges      Code   Minutes Charges    None             Start Time: 1022  Stop Time: 1109  Time Calculation (min): 47 min  Total Timed Code Minutes- PT: 47 minute(s)     Therapy Charges for Today     Code Description Service Date Service Provider Modifiers Qty    45585075956 HC PT EVAL MOD COMPLEXITY 2 11/6/2018 Matt Miller, PT GP 1    84742688252 HC PT THER PROC EA 15 MIN 11/6/2018 Matt Miller, PT GP 1          PT G-Codes  Outcome Measure Options: Timed Up and Go (TUG)  TUG Test 1:  (41 sec w/ NBQC)         Matt Miller, PT  11/6/2018

## 2018-11-09 ENCOUNTER — HOSPITAL ENCOUNTER (OUTPATIENT)
Dept: PHYSICAL THERAPY | Facility: HOSPITAL | Age: 53
Setting detail: THERAPIES SERIES
Discharge: HOME | End: 2018-11-09

## 2018-11-09 DIAGNOSIS — G81.91 RIGHT HEMIPARESIS (HCC): ICD-10-CM

## 2018-11-09 DIAGNOSIS — I63.9 CEREBROVASCULAR ACCIDENT (CVA), UNSPECIFIED MECHANISM (HCC): Primary | ICD-10-CM

## 2018-11-09 PROCEDURE — 97110 THERAPEUTIC EXERCISES: CPT

## 2018-11-09 NOTE — THERAPY TREATMENT NOTE
Outpatient Physical Therapy Ortho Treatment Note  [facility]  Irene Gaines PTA       Patient Name: Modesta Kaur  : 1965  MRN: [id number]  Today's Date: 2018      Visit Date: 2018     Visits: 2/2  Insurance Visits Approved: 4 visits  Recert Due: 2018  MD Appt: TBD  Pain: pretreatment 0/10; post treatment 0/10  Improvement: pt is subjectively reporting 0% improvement since initial evaluation    Visit Dx:    ICD-10-CM ICD-9-CM   1. Cerebrovascular accident (CVA), unspecified mechanism (CMS/HCC) I63.9 434.91   2. Right hemiparesis (CMS/HCC) G81.91 342.90       Patient Active Problem List   Diagnosis   • Type 2 diabetes mellitus without complication, without long-term current use of insulin (CMS/HCC)   • Mild intermittent asthma without complication   • Essential hypertension   • Seasonal allergic rhinitis due to pollen   • Need for hepatitis C screening test   • Cerebrovascular accident (CMS/HCC)        Past Medical History:   Diagnosis Date   • Allergic rhinitis    • Asthma    • Cellulitis of other sites     right foot,resolved   • Cellulitis of other sites - right foot, resolved    • Cerebrovascular accident (CMS/HCC)    • Essential hypertension    • Other specified local infections of the skin and subcutaneous tissue - right foot.    • Rash and other nonspecific skin eruption    • Right hemiparesis (CMS/HCC)    • Streptococcal sore throat    • Stroke (CMS/HCC)    • Type 2 diabetes mellitus (CMS/HCC)    • URI (upper respiratory infection)         No past surgical history on file.          PT Ortho     Row Name 18 1000       Subjective Comments    Subjective Comments states that she had to stop coming before because she was hospitalized secondary to unable to stop using the bathroom  -       Precautions and Contraindications    Precautions/Limitations fall precautions  -    Precautions hypertonicity with R UE>LE; pt wears R AFO  -       Subjective Pain     Able to rate subjective pain? yes  -    Pre-Treatment Pain Level 0  -    Post-Treatment Pain Level 0  -       Posture/Observations    Posture/Observations Comments ambulates into clinic with Quad cane on L side with R UE drawn into elbow flexion and wrist closed  -      User Key  (r) = Recorded By, (t) = Taken By, (c) = Cosigned By    Initials Name Provider Type     Irene Gaines PTA Physical Therapy Assistant                            PT Assessment/Plan     Row Name 11/09/18 1100          PT Assessment    Assessment Comments patient did well with side stepping in pbars today. with visual cues for equal stepping patient is able to take larger size steps in Pbars. cues for safety with sitting  -        PT Plan    PT Frequency 2x/week  -     PT Plan Comments 2 more visits to progress HEP and then discharge to home per POC and authorized insurance visits  -       User Key  (r) = Recorded By, (t) = Taken By, (c) = Cosigned By    Initials Name Provider Type     Irene Gaines PTA Physical Therapy Assistant                    Exercises     Row Name 11/09/18 1000             Subjective Comments    Subjective Comments states that she had to stop coming before because she was hospitalized secondary to unable to stop using the bathroom  -         Subjective Pain    Able to rate subjective pain? yes  -      Pre-Treatment Pain Level 0  -      Post-Treatment Pain Level 0  -         Exercise 1    Exercise Name 1 Pro II LE L 5.0  -      Time 1 10 minutes  -      Additional Comments plastic wrap to hold R LE onto bike  -         Exercise 2    Exercise Name 2 LAQ alt LE  -      Time 2 3 minutes; 5 sec hold each rep  -         Exercise 3    Exercise Name 3 Seated Hip ABD with Tband resist  -      Time 3 3 minuets  -      Additional Comments green  -         Exercise 4    Exercise Name 4 Pbar gait with equal size and timed stepping utilizing lines on floor of Pbars  -         Exercise  5    Exercise Name 5 Sit to/from Stand  -      Reps 5 20 reps  -      Additional Comments last ten reps has a 4# weight in R UE to keep arm extended at side  -         Exercise 6    Exercise Name 6 Pbars side stepping   -         Exercise 7    Exercise Name 7 ambulation with quad cane  -      Additional Comments cues for increased step length and for timing  -        User Key  (r) = Recorded By, (t) = Taken By, (c) = Cosigned By    Initials Name Provider Type     Irene Gaines PTA Physical Therapy Assistant                               PT OP Goals     Row Name 11/09/18 1100          PT Short Term Goals    STG Date to Achieve 11/27/18  -     STG 1 Pt indep with HEP for R LE strengthening and balance  -     STG 1 Progress Progressing  -     STG 2 Improve R LE MMT (R knee flex/ext, hip flex) by 1/2 to 1 muscle grade  -     STG 2 Progress Progressing  -     STG 3 Improve TUG test to 36 sec or less with NBQC  -     STG 3 Progress Progressing  -        Time Calculation    PT Goal Re-Cert Due Date 11/27/18  -       User Key  (r) = Recorded By, (t) = Taken By, (c) = Cosigned By    Initials Name Provider Type     Irene Gaines PTA Physical Therapy Assistant          Therapy Education  Given: HEP, Symptoms/condition management, Posture/body mechanics  Program: Reinforced  How Provided: Verbal, Demonstration  Provided to: Patient  Level of Understanding: Verbalized              Time Calculation:   Start Time: 1015  Stop Time: 1120  Time Calculation (min): 65 min  Total Timed Code Minutes- PT: 65 minute(s)  Therapy Suggested Charges     Code   Minutes Charges    None           Therapy Charges for Today     Code Description Service Date Service Provider Modifiers Qty    09539441089 HC PT THER PROC EA 15 MIN 11/9/2018 Irene Gaines PTA GP 4    94659428763 HC PT THER SUPP EA 15 MIN 11/9/2018 Irene Gaines PTA GP 1                    Irene Gaines PTA  11/9/2018

## 2018-11-12 RX ORDER — PREDNISONE 10 MG/1
TABLET ORAL
Qty: 30 TABLET | Refills: 3 | OUTPATIENT
Start: 2018-11-12 | End: 2019-03-29 | Stop reason: SDUPTHER

## 2018-11-13 ENCOUNTER — HOSPITAL ENCOUNTER (OUTPATIENT)
Dept: PHYSICAL THERAPY | Facility: HOSPITAL | Age: 53
Setting detail: THERAPIES SERIES
Discharge: HOME | End: 2018-11-13

## 2018-11-13 DIAGNOSIS — I63.9 CEREBROVASCULAR ACCIDENT (CVA), UNSPECIFIED MECHANISM (HCC): Primary | ICD-10-CM

## 2018-11-13 DIAGNOSIS — G81.91 RIGHT HEMIPARESIS (HCC): ICD-10-CM

## 2018-11-13 PROCEDURE — 97110 THERAPEUTIC EXERCISES: CPT

## 2018-11-13 NOTE — THERAPY TREATMENT NOTE
Outpatient Physical Therapy Ortho Treatment Note  [facility]     Patient Name: Modesta Kaur  : 1965  MRN: [id number]  Today's Date: 2018      Visit Date: 2018  Pt reports 0/10 pain pre treatment, 0/10 pain post treatment  Reports 0% of improvement.  Attended 3/3 visits.  Insurance available: 4 visits  Next MD appt: QUIN .  Recertification: 2018.  Visit Dx:    ICD-10-CM ICD-9-CM   1. Cerebrovascular accident (CVA), unspecified mechanism (CMS/HCC) I63.9 434.91   2. Right hemiparesis (CMS/HCC) G81.91 342.90       Patient Active Problem List   Diagnosis   • Type 2 diabetes mellitus without complication, without long-term current use of insulin (CMS/HCC)   • Mild intermittent asthma without complication   • Essential hypertension   • Seasonal allergic rhinitis due to pollen   • Need for hepatitis C screening test   • Cerebrovascular accident (CMS/HCC)        Past Medical History:   Diagnosis Date   • Allergic rhinitis    • Asthma    • Cellulitis of other sites     right foot,resolved   • Cellulitis of other sites - right foot, resolved    • Cerebrovascular accident (CMS/HCC)    • Essential hypertension    • Other specified local infections of the skin and subcutaneous tissue - right foot.    • Rash and other nonspecific skin eruption    • Right hemiparesis (CMS/HCC)    • Streptococcal sore throat    • Stroke (CMS/HCC)    • Type 2 diabetes mellitus (CMS/HCC)    • URI (upper respiratory infection)         No past surgical history on file.    PT Ortho     Row Name 18 1000       Subjective Comments    Subjective Comments  pt denies pain this date.  -TL       Precautions and Contraindications    Precautions/Limitations  fall precautions  -TL    Precautions  hypertonicity with R UE>LE; pt wears R AFO  -TL       Subjective Pain    Able to rate subjective pain?  yes  -TL    Pre-Treatment Pain Level  0  -TL    Post-Treatment Pain Level  0  -TL       Posture/Observations     Posture/Observations Comments  adjusted assisted device to proper height. pt walking with quad cane.  -TL      User Key  (r) = Recorded By, (t) = Taken By, (c) = Cosigned By    Initials Name Provider Type    Camilla Bennett PTA Physical Therapy Assistant                      PT Assessment/Plan     Row Name 11/13/18 1100          PT Assessment    Assessment Comments  No new goals met. PTA instructed pt to have spouse to come in for last visit for caregiver and pt education. Pt aware of d/c next visit.No new goals met at this time.  -TL        PT Plan    PT Plan Comments  d/c next visit with caregiver education  -TL       User Key  (r) = Recorded By, (t) = Taken By, (c) = Cosigned By    Initials Name Provider Type    Camilla Bennett PTA Physical Therapy Assistant              Exercises     Row Name 11/13/18 1000             Subjective Comments    Subjective Comments  pt denies pain this date.  -TL         Subjective Pain    Able to rate subjective pain?  yes  -TL      Pre-Treatment Pain Level  0  -TL      Post-Treatment Pain Level  0  -TL         Exercise 1    Exercise Name 1  Pro II LE L 5.0  -TL      Time 1  10 minutes  -TL      Additional Comments  plastic wrap right leg  -TL         Exercise 2    Exercise Name 2  Bridges with hip add using ball  -TL      Sets 2  2  -TL      Reps 2  10  -TL      Time 2  5 sec hold  -TL         Exercise 3    Exercise Name 3  supine SLR  -TL      Sets 3  2  -TL      Reps 3  10  -TL      Additional Comments  on Right leg  -TL         Exercise 4    Exercise Name 4  sidelying SLR on right leg  -TL      Sets 4  2  -TL      Reps 4  10  -TL         Exercise 5    Exercise Name 5  hooklying hip add with RTB  -TL      Sets 5  2  -TL      Reps 5  10  -TL         Exercise 6    Exercise Name 6  worked on gait //bars step through  -TL      Cueing 6  Verbal;Tactile on stride length, heelstrike and push off  -TL      Reps 6  10 laps  -TL      Additional Comments  touching blue line  alternationg  -TL         Exercise 7    Exercise Name 7  backwards gait in //  -TL      Cueing 7  Verbal;Tactile  -TL      Reps 7  5 laps  -TL         Exercise 8    Exercise Name 8  sidestepping  -TL      Cueing 8  Verbal;Tactile  -TL      Reps 8  5 laps  -TL         Exercise 9    Exercise Name 9  Gait with quad cane  -TL      Cueing 9  Verbal;Tactile;Demo cues on longer stride length on right leg and heelstrike  -TL      Additional Comments  SBA of 1 150feet x1,65x2  -TL        User Key  (r) = Recorded By, (t) = Taken By, (c) = Cosigned By    Initials Name Provider Type    Camilla Bennett PTA Physical Therapy Assistant                         PT OP Goals     Row Name 11/13/18 1000          PT Short Term Goals    STG Date to Achieve  11/27/18  -TL     STG 1  Pt indep with HEP for R LE strengthening and balance  -TL     STG 1 Progress  Progressing  -TL     STG 2  Improve R LE MMT (R knee flex/ext, hip flex) by 1/2 to 1 muscle grade  -TL     STG 2 Progress  Progressing  -TL     STG 3  Improve TUG test to 36 sec or less with NBQC  -TL     STG 3 Progress  Progressing  -TL        Time Calculation    PT Goal Re-Cert Due Date  11/27/18  -TL       User Key  (r) = Recorded By, (t) = Taken By, (c) = Cosigned By    Initials Name Provider Type    Camilla Bennett PTA Physical Therapy Assistant          Therapy Education  Education Details: bridges with add, SLR, sidelying hip abd, hooklying hip add RTB  Given: HEP, Symptoms/condition management, Pain management, Posture/body mechanics  Program: Rein[employer]  How Provided: Verbal, Demonstration  Provided to: Patient  Level of Understanding: Verbalized, Demonstrated              Time Calculation:   Start Time: 0955  Stop Time: 1100  Time Calculation (min): 65 min  Total Timed Code Minutes- PT: 65 minute(s)  Therapy Suggested Charges     Code   Minutes Charges    None           Therapy Charges for Today     Code Description Service Date Service Provider Modifiers Qty     67689823997  PT THER PROC EA 15 MIN 11/13/2018 Camilla Mccullough, PTA GP 4                    Camilla Mccullough, NAI  11/13/2018

## 2018-11-16 ENCOUNTER — HOSPITAL ENCOUNTER (OUTPATIENT)
Dept: PHYSICAL THERAPY | Facility: HOSPITAL | Age: 53
Setting detail: THERAPIES SERIES
Discharge: HOME | End: 2018-11-16

## 2018-11-16 DIAGNOSIS — G81.91 RIGHT HEMIPARESIS (HCC): ICD-10-CM

## 2018-11-16 DIAGNOSIS — I63.9 CEREBROVASCULAR ACCIDENT (CVA), UNSPECIFIED MECHANISM (HCC): Primary | ICD-10-CM

## 2018-11-16 PROCEDURE — 97110 THERAPEUTIC EXERCISES: CPT

## 2018-11-16 NOTE — THERAPY DISCHARGE NOTE
Outpatient Physical Therapy Neuro Treatment Note/Discharge Summary  [facility]     Patient Name: Modesta Kaur  : 1965  MRN: [id number]  Today's Date: 2018      Visit Date: 2018  Pt reports 0/10 pain pre treatment, 0/10 pain post treatment  Reports 0% of improvement.  Attended 4/ 4visits.  Insurance available: 4 visits  Next MD appt: [id number].  Recertification: d/c  Visit Dx:    ICD-10-CM ICD-9-CM   1. Cerebrovascular accident (CVA), unspecified mechanism (CMS/HCC) I63.9 434.91   2. Right hemiparesis (CMS/HCC) G81.91 342.90       Patient Active Problem List   Diagnosis   • Type 2 diabetes mellitus without complication, without long-term current use of insulin (CMS/HCC)   • Mild intermittent asthma without complication   • Essential hypertension   • Seasonal allergic rhinitis due to pollen   • Need for hepatitis C screening test   • Cerebrovascular accident (CMS/[facility])          PT Ortho     Row Name 18 1000       Precautions and Contraindications    Precautions/Limitations  fall precautions  -TL    Precautions  hypertonicity with R UE>LE; pt wears R AFO  -TL       Subjective Pain    Able to rate subjective pain?  yes  -TL    Pre-Treatment Pain Level  0  -TL    Post-Treatment Pain Level  0  -TL      User Key  (r) = Recorded By, (t) = Taken By, (c) = Cosigned By    Initials Name Provider Type    Camilla Bennett PTA Physical Therapy Assistant                    PT Assessment/Plan     Row Name 18 1000          PT Assessment    Assessment Comments  Pt and spouse education. pt met short term goals #1. Pt limited with strength. Pt caregiver verbalized understanding of all HEP. Pt has copies and written instructions with pictures. Pt earned a free 30 day membership to [employer] to continue with strengthening. Pt d/c this date per poc. PTA encourage pt and caregiver to follow up with these ex at home.   -TL        PT Plan    PT Plan Comments  D/C  -TL       User Key   (r) = Recorded By, (t) = Taken By, (c) = Cosigned By    Initials Name Provider Type    TL Camilla Mccullough PTA Physical Therapy Assistant               Exercises     Row Name 11/16/18 1000             Subjective Comments    Subjective Comments  Education with pt and caregiver this date. Pt given RTB for ex.Pt came in 12mins late.  -TL         Subjective Pain    Able to rate subjective pain?  yes  -TL      Pre-Treatment Pain Level  0  -TL      Post-Treatment Pain Level  0  -TL         Exercise 1    Exercise Name 1  Review bridges with ball squeezes  -TL      Cueing 1  Verbal;Demo  -TL      Reps 1  10  -TL         Exercise 2    Exercise Name 2  SLR  -TL      Cueing 2  Demo  -TL      Reps 2  10  -TL      Additional Comments  review with pt and caregiver  -TL         Exercise 3    Exercise Name 3  sidelying Hip abd SLR  -TL      Reps 3  10  -TL      Additional Comments  review  -TL         Exercise 4    Exercise Name 4  hooklying hip add RTB  -TL      Reps 4  10  -TL      Additional Comments  review  -TL         Exercise 5    Exercise Name 5  hooklying hip abd RTB  -TL      Reps 5  10  -TL      Additional Comments  review  -TL         Exercise 6    Exercise Name 6  LAQ  -TL      Reps 6  10  -TL      Additional Comments  reveiw  -TL         Exercise 7    Exercise Name 7  seated ham curls  -TL      Reps 7  10  -TL      Additional Comments  review RTB  -TL         Exercise 8    Exercise Name 8  st marching  -TL      Reps 8  10  -TL         Exercise 9    Exercise Name 9  sit to stands with one hand support up and no hands down  -TL      Reps 9  10  -TL         Exercise 10    Exercise Name 10  st hip abd/ext/flex  -TL      Reps 10  10  -TL        User Key  (r) = Recorded By, (t) = Taken By, (c) = Cosigned By    Initials Name Provider Type    TL Camilla Mccullough PTA Physical Therapy Assistant                        PT OP Goals     Row Name 11/16/18 1000          PT Short Term Goals    STG Date to Achieve  11/27/18  -TL      STG 1  Pt indep with HEP for R LE strengthening and balance  -TL     STG 1 Progress  Met  -TL     STG 2  Improve R LE MMT (R knee flex/ext, hip flex) by 1/2 to 1 muscle grade  -TL     STG 2 Progress  Not Met  -TL     STG 3  Improve TUG test to 36 sec or less with NBQC  -TL     STG 3 Progress  Not Met  -TL        Time Calculation    PT Goal Re-Cert Due Date  11/27/18  -TL       User Key  (r) = Recorded By, (t) = Taken By, (c) = Cosigned By    Initials Name Provider Type    Camilla Bennett, NAI Physical Therapy Assistant          Therapy Education  Given: HEP, Symptoms/condition management, Posture/body mechanics, Fall prevention and home safety  Program: Reinforced  How Provided: Verbal, Demonstration, Written  Provided to: Patient  Level of Understanding: Verbalized, Demonstrated            Time Calculation:   Start Time: 0940  Stop Time: 1025  Time Calculation (min): 45 min  Total Timed Code Minutes- PT: 45 minute(s)   Therapy Suggested Charges     Code   Minutes Charges    None           Therapy Charges for Today     Code Description Service Date Service Provider Modifiers Qty    73608851094 HC PT THER PROC EA 15 MIN 11/16/2018 Camilla Mccullough PTA GP 3                OP PT Discharge Summary  Date of Discharge: 11/16/18  Reason for Discharge: Maximum functional potential achieved  Outcomes Achieved: Patient able to partially acheive established goals  Discharge Destination: Home with home program  Discharge Instructions/Additional Comments: earned a free 30 day membership to [employer] with caregiver assistance.        Camilla Mccullough PTA  11/16/2018

## 2018-11-16 NOTE — THERAPY DISCHARGE NOTE
Outpatient Physical Therapy Ortho Treatment Note/Discharge Summary  [facility]     Patient Name: Modesta Kaur  : 1965  MRN: [id number]  Today's Date: 2018      Visit Date: 2018  Pt reports 0/10 pain pre treatment, 0/10 pain post treatment  Reports 0% of improvement.  Attended 4/4 visits.  Insurance available:4 visits   Next MD appt: [id number] .  Recertification: 2018.  Visit Dx:    ICD-10-CM ICD-9-CM   1. Cerebrovascular accident (CVA), unspecified mechanism (CMS/HCC) I63.9 434.91   2. Right hemiparesis (CMS/HCC) G81.91 342.90       Patient Active Problem List   Diagnosis   • Type 2 diabetes mellitus without complication, without long-term current use of insulin (CMS/HCC)   • Mild intermittent asthma without complication   • Essential hypertension   • Seasonal allergic rhinitis due to pollen   • Need for hepatitis C screening test   • Cerebrovascular accident (CMS/HCC)        Past Medical History:   Diagnosis Date   • Allergic rhinitis    • Asthma    • Cellulitis of other sites     right foot,resolved   • Cellulitis of other sites - right foot, resolved    • Cerebrovascular accident (CMS/HCC)    • Essential hypertension    • Other specified local infections of the skin and subcutaneous tissue - right foot.    • Rash and other nonspecific skin eruption    • Right hemiparesis (CMS/HCC)    • Streptococcal sore throat    • Stroke (CMS/HCC)    • Type 2 diabetes mellitus (CMS/HCC)    • URI (upper respiratory infection)         No past surgical history on file.    PT Ortho     Row Name 18 1000       Precautions and Contraindications    Precautions/Limitations  fall precautions  -TL    Precautions  hypertonicity with R UE>LE; pt wears R AFO  -TL       Subjective Pain    Able to rate subjective pain?  yes  -TL    Pre-Treatment Pain Level  0  -TL    Post-Treatment Pain Level  0  -TL      User Key  (r) = Recorded By, (t) = Taken By, (c) = Cosigned By    Initials Name  Provider Type    Camilla Bennett PTA Physical Therapy Assistant                      PT Assessment/Plan     Row Name 11/16/18 1000          PT Assessment    Assessment Comments  Pt and spouse education. pt met short term goals #1. Pt limited with strength. Pt caregiver verbalized understanding of all HEP. Pt has copies and written instructions with pictures. Pt earned a free 30 day membership to [employer] to continue with strengthening. Pt d/c this date per poc. PTA encourage pt and caregiver to follow up with these ex at home.   -TL        PT Plan    PT Plan Comments  D/C  -TL       User Key  (r) = Recorded By, (t) = Taken By, (c) = Cosigned By    Initials Name Provider Type    Camilla Bennett PTA Physical Therapy Assistant              Exercises     Row Name 11/16/18 1000             Subjective Comments    Subjective Comments  Education with pt and caregiver this date. Pt given RTB for ex.Pt came in 12mins late.  -TL         Subjective Pain    Able to rate subjective pain?  yes  -TL      Pre-Treatment Pain Level  0  -TL      Post-Treatment Pain Level  0  -TL         Exercise 1    Exercise Name 1  Review bridges with ball squeezes  -TL      Cueing 1  Verbal;Demo  -TL      Reps 1  10  -TL         Exercise 2    Exercise Name 2  SLR  -TL      Cueing 2  Demo  -TL      Reps 2  10  -TL      Additional Comments  review with pt and caregiver  -TL         Exercise 3    Exercise Name 3  sidelying Hip abd SLR  -TL      Reps 3  10  -TL      Additional Comments  review  -TL         Exercise 4    Exercise Name 4  hooklying hip add RTB  -TL      Reps 4  10  -TL      Additional Comments  review  -TL         Exercise 5    Exercise Name 5  hooklying hip abd RTB  -TL      Reps 5  10  -TL      Additional Comments  review  -TL         Exercise 6    Exercise Name 6  LAQ  -TL      Reps 6  10  -TL      Additional Comments  reveiw  -TL         Exercise 7    Exercise Name 7  seated ham curls  -TL      Reps 7  10  -TL       Additional Comments  review RTB  -TL         Exercise 8    Exercise Name 8  st marching  -TL      Reps 8  10  -TL         Exercise 9    Exercise Name 9  sit to stands with one hand support up and no hands down  -TL      Reps 9  10  -TL         Exercise 10    Exercise Name 10  st hip abd/ext/flex  -TL      Reps 10  10  -TL        User Key  (r) = Recorded By, (t) = Taken By, (c) = Cosigned By    Initials Name Provider Type    TL Camilla Mccullough PTA Physical Therapy Assistant                         PT OP Goals     Row Name 11/16/18 1000          PT Short Term Goals    STG Date to Achieve  11/27/18  -TL     STG 1  Pt indep with HEP for R LE strengthening and balance  -TL     STG 1 Progress  Met  -TL     STG 2  Improve R LE MMT (R knee flex/ext, hip flex) by 1/2 to 1 muscle grade  -TL     STG 2 Progress  Not Met  -TL     STG 3  Improve TUG test to 36 sec or less with NBQC  -TL     STG 3 Progress  Not Met  -TL        Time Calculation    PT Goal Re-Cert Due Date  11/27/18  -       User Key  (r) = Recorded By, (t) = Taken By, (c) = Cosigned By    Initials Name Provider Type    Camilla Bennett PTA Physical Therapy Assistant          Therapy Education  Given: HEP, Symptoms/condition management, Posture/body mechanics, Fall prevention and home safety  Program: Reinforced  How Provided: Verbal, Demonstration, Written  Provided to: Patient  Level of Understanding: Verbalized, Demonstrated              Time Calculation:   Start Time: 0940  Stop Time: 1025  Time Calculation (min): 45 min  Total Timed Code Minutes- PT: 45 minute(s)  Therapy Suggested Charges     Code   Minutes Charges    None           Therapy Charges for Today     Code Description Service Date Service Provider Modifiers Qty    82822329845 HC PT THER PROC EA 15 MIN 11/16/2018 Camilla Mccullough PTA GP 3                OP PT Discharge Summary  Date of Discharge: 11/16/18  Reason for Discharge: Maximum functional potential achieved  Outcomes Achieved:  Patient able to partially acheive established goals  Discharge Destination: Home with home program  Discharge Instructions/Additional Comments: earned a free 30 day membership to [employer] with caregiver assistance.      Camilla Mccullough, PTA  11/16/2018

## 2018-11-19 ENCOUNTER — APPOINTMENT (OUTPATIENT)
Dept: PHYSICAL THERAPY | Facility: HOSPITAL | Age: 53
End: 2018-11-19

## 2018-11-26 RX ORDER — TIZANIDINE 4 MG/1
TABLET ORAL
Qty: 90 TABLET | Refills: 3 | Status: SHIPPED | OUTPATIENT
Start: 2018-11-26 | End: 2019-03-11 | Stop reason: SDUPTHER

## 2018-11-26 RX ORDER — PNV NO.95/FERROUS FUM/FOLIC AC 28MG-0.8MG
TABLET ORAL
Qty: 60 TABLET | Refills: 3 | Status: SHIPPED | OUTPATIENT
Start: 2018-11-26 | End: 2019-03-11 | Stop reason: SDUPTHER

## 2019-03-08 ENCOUNTER — HOSPITAL ENCOUNTER (EMERGENCY)
Facility: HOSPITAL | Age: 54
Discharge: HOME | End: 2019-03-08
Attending: FAMILY MEDICINE | Admitting: FAMILY MEDICINE

## 2019-03-08 VITALS
WEIGHT: 137.1 LBS | RESPIRATION RATE: 18 BRPM | TEMPERATURE: 98.4 F | OXYGEN SATURATION: 97 % | HEIGHT: 60 IN | SYSTOLIC BLOOD PRESSURE: 193 MMHG | HEART RATE: 78 BPM | DIASTOLIC BLOOD PRESSURE: 98 MMHG | BODY MASS INDEX: 26.92 KG/M2

## 2019-03-08 DIAGNOSIS — R20.0 PERIORAL NUMBNESS: ICD-10-CM

## 2019-03-08 DIAGNOSIS — I10 ESSENTIAL HYPERTENSION: Primary | ICD-10-CM

## 2019-03-08 PROCEDURE — 99283 EMERGENCY DEPT VISIT LOW MDM: CPT

## 2019-03-08 RX ORDER — AMLODIPINE BESYLATE 10 MG/1
10 TABLET ORAL DAILY
Qty: 30 TABLET | Refills: 0 | Status: SHIPPED | OUTPATIENT
Start: 2019-03-08 | End: 2019-03-11 | Stop reason: SDUPTHER

## 2019-03-08 RX ORDER — AMLODIPINE BESYLATE 10 MG/1
10 TABLET ORAL ONCE
Status: COMPLETED | OUTPATIENT
Start: 2019-03-08 | End: 2019-03-08

## 2019-03-08 RX ORDER — KETOROLAC TROMETHAMINE 30 MG/ML
30 INJECTION, SOLUTION INTRAMUSCULAR; INTRAVENOUS ONCE
Status: DISCONTINUED | OUTPATIENT
Start: 2019-03-08 | End: 2019-03-08

## 2019-03-08 RX ADMIN — AMLODIPINE BESYLATE 10 MG: 10 TABLET ORAL at 13:53

## 2019-03-08 NOTE — ED PROVIDER NOTES
Subjective   Pt has been having perioral numbness for the past week since she ran out of her BP meds. She has had similar symptoms in the past.         Hypertension   Severity:  Moderate  Onset quality:  Unable to specify  Duration:  1 week  Timing:  Constant  Progression:  Unchanged  Chronicity:  Recurrent  Associated symptoms: no abdominal pain, no chest pain, no confusion, no dizziness, no ear pain, no epistaxis, no fatigue, no fever, no headaches, no nausea, no neck pain, no shortness of breath, not vomiting and no weakness        Review of Systems   Constitutional: Negative for appetite change, chills, diaphoresis, fatigue and fever.   HENT: Negative for congestion, ear discharge, ear pain, nosebleeds, rhinorrhea, sinus pressure, sore throat and trouble swallowing.    Eyes: Negative for discharge and redness.   Respiratory: Negative for apnea, cough, chest tightness, shortness of breath and wheezing.    Cardiovascular: Negative for chest pain.   Gastrointestinal: Negative for abdominal pain, diarrhea, nausea and vomiting.   Endocrine: Negative for polyuria.   Genitourinary: Negative for dysuria, frequency and urgency.   Musculoskeletal: Negative for myalgias and neck pain.   Skin: Negative for color change and rash.   Allergic/Immunologic: Negative for immunocompromised state.   Neurological: Negative for dizziness, seizures, syncope, weakness, light-headedness and headaches.   Hematological: Negative for adenopathy. Does not bruise/bleed easily.   Psychiatric/Behavioral: Negative for behavioral problems and confusion.   All other systems reviewed and are negative.      Past Medical History:   Diagnosis Date   • Allergic rhinitis    • Asthma    • Cellulitis of other sites     right foot,resolved   • Cellulitis of other sites - right foot, resolved    • Cerebrovascular accident (CMS/HCC)    • Essential hypertension    • Other specified local infections of the skin and subcutaneous tissue - right foot.    • Rash  and other nonspecific skin eruption    • Right hemiparesis (CMS/HCC)    • Streptococcal sore throat    • Stroke (CMS/HCC)    • Type 2 diabetes mellitus (CMS/HCC)    • URI (upper respiratory infection)        Allergies   Allergen Reactions   • Lisinopril    • Triamterene        History reviewed. No pertinent surgical history.    Family History   Problem Relation Age of Onset   • Diabetes Other        Social History     Socioeconomic History   • Marital status:      Spouse name: Not on file   • Number of children: Not on file   • Years of education: Not on file   • Highest education level: Not on file   Tobacco Use   • Smoking status: Never Smoker   • Smokeless tobacco: Never Used   Substance and Sexual Activity   • Alcohol use: No   • Drug use: Defer   • Sexual activity: Defer           Objective   Physical Exam   Constitutional: She is oriented to person, place, and time. She appears well-developed and well-nourished.   HENT:   Head: Normocephalic and atraumatic.   Nose: Nose normal.   Mouth/Throat: Oropharynx is clear and moist.   Eyes: Conjunctivae and EOM are normal. Pupils are equal, round, and reactive to light. Right eye exhibits no discharge. Left eye exhibits no discharge. No scleral icterus.   Neck: Normal range of motion. Neck supple. No tracheal deviation present.   Cardiovascular: Normal rate, regular rhythm and normal heart sounds.   No murmur heard.  Pulmonary/Chest: Effort normal and breath sounds normal. No stridor. No respiratory distress. She has no wheezes. She has no rales.   Abdominal: Soft. Bowel sounds are normal. She exhibits no distension and no mass. There is no tenderness. There is no rebound and no guarding.   Musculoskeletal: She exhibits no edema.   Neurological: She is alert and oriented to person, place, and time. Coordination normal.   Skin: Skin is warm and dry. No rash noted. No erythema.   Psychiatric: She has a normal mood and affect. Her behavior is normal. Thought  content normal.   Nursing note and vitals reviewed.      Procedures           ED Course  ED Course as of Mar 08 1734   Fri Mar 08, 2019   1732 Workup including labs and imaging offered, but patient and significant other have refused and states that this (HTN with perioral numbness ) has happened in the past and the would just like her blood pressure medication refilled.  Patient was placed back on her amlodipine 10 mg p.o. daily, first dose was given in the emergency department.  Patient states she no longer takes her diuretic.  [CB]      ED Course User Index  [CB] Laron Brink MD                  [facility]      Final diagnoses:   Essential hypertension   Perioral numbness            Laron Brink MD  03/08/19 1733       Laron Brink MD  03/08/19 1731

## 2019-03-11 ENCOUNTER — OFFICE VISIT (OUTPATIENT)
Dept: FAMILY MEDICINE CLINIC | Facility: CLINIC | Age: 54
End: 2019-03-11

## 2019-03-11 VITALS
BODY MASS INDEX: 30.23 KG/M2 | TEMPERATURE: 97.4 F | HEIGHT: 60 IN | RESPIRATION RATE: 18 BRPM | SYSTOLIC BLOOD PRESSURE: 152 MMHG | DIASTOLIC BLOOD PRESSURE: 92 MMHG | HEART RATE: 76 BPM | WEIGHT: 154 LBS | OXYGEN SATURATION: 98 %

## 2019-03-11 DIAGNOSIS — E78.5 HYPERLIPIDEMIA, UNSPECIFIED HYPERLIPIDEMIA TYPE: ICD-10-CM

## 2019-03-11 DIAGNOSIS — E11.9 TYPE 2 DIABETES MELLITUS WITHOUT COMPLICATION, WITHOUT LONG-TERM CURRENT USE OF INSULIN (HCC): ICD-10-CM

## 2019-03-11 DIAGNOSIS — I63.9 CEREBROVASCULAR ACCIDENT (CVA), UNSPECIFIED MECHANISM (HCC): Primary | ICD-10-CM

## 2019-03-11 DIAGNOSIS — I10 ESSENTIAL HYPERTENSION: ICD-10-CM

## 2019-03-11 PROCEDURE — 99214 OFFICE O/P EST MOD 30 MIN: CPT | Performed by: NURSE PRACTITIONER

## 2019-03-11 RX ORDER — TIZANIDINE 4 MG/1
4 TABLET ORAL 3 TIMES DAILY
Qty: 90 TABLET | Refills: 3 | Status: SHIPPED | OUTPATIENT
Start: 2019-03-11 | End: 2019-08-05 | Stop reason: SDUPTHER

## 2019-03-11 RX ORDER — LOSARTAN POTASSIUM 100 MG/1
100 TABLET ORAL DAILY
Qty: 30 TABLET | Refills: 5 | Status: SHIPPED | OUTPATIENT
Start: 2019-03-11 | End: 2019-08-05 | Stop reason: SDUPTHER

## 2019-03-11 RX ORDER — ERGOCALCIFEROL 1.25 MG/1
50000 CAPSULE ORAL
Qty: 4 CAPSULE | Refills: 3 | Status: SHIPPED | OUTPATIENT
Start: 2019-03-11 | End: 2019-08-05 | Stop reason: SDUPTHER

## 2019-03-11 RX ORDER — AMLODIPINE BESYLATE 10 MG/1
10 TABLET ORAL DAILY
Qty: 30 TABLET | Refills: 0 | Status: SHIPPED | OUTPATIENT
Start: 2019-03-11 | End: 2019-08-05 | Stop reason: SDUPTHER

## 2019-03-11 RX ORDER — SPIRONOLACTONE 25 MG/1
25 TABLET ORAL DAILY
Qty: 30 TABLET | Refills: 5 | Status: SHIPPED | OUTPATIENT
Start: 2019-03-11 | End: 2019-08-05 | Stop reason: SDUPTHER

## 2019-03-11 RX ORDER — ATORVASTATIN CALCIUM 40 MG/1
40 TABLET, FILM COATED ORAL DAILY
Qty: 30 TABLET | Refills: 3 | Status: SHIPPED | OUTPATIENT
Start: 2019-03-11 | End: 2019-07-18 | Stop reason: SDUPTHER

## 2019-03-11 RX ORDER — PNV NO.95/FERROUS FUM/FOLIC AC 28MG-0.8MG
1 TABLET ORAL 2 TIMES DAILY
Qty: 60 TABLET | Refills: 3 | Status: SHIPPED | OUTPATIENT
Start: 2019-03-11 | End: 2019-08-05 | Stop reason: SDUPTHER

## 2019-03-11 RX ORDER — CLOPIDOGREL BISULFATE 75 MG/1
75 TABLET ORAL DAILY
Qty: 30 TABLET | Refills: 3 | Status: SHIPPED | OUTPATIENT
Start: 2019-03-11 | End: 2019-07-18 | Stop reason: SDUPTHER

## 2019-03-11 NOTE — PROGRESS NOTES
Subjective   Modesta Kaur is a 53 y.o. female.     Here today for zhanna.  She has had a cva and has right sided weakness. va is willing to help with the cost of a sitter/care taker.  Ms kaur also has issues with hypertension and diabetes.  She does not recall what her b/s is running.      Cerebrovascular Accident   This is a chronic problem. The current episode started more than 1 year ago. The problem occurs constantly. The problem has been unchanged. Associated symptoms include arthralgias and weakness. Pertinent negatives include no abdominal pain, anorexia, change in bowel habit, chills, congestion, coughing, diaphoresis, fatigue, fever, headaches, joint swelling, nausea, neck pain, numbness, rash, sore throat, swollen glands, urinary symptoms, vertigo, visual change or vomiting. The symptoms are aggravated by stress, walking, exertion and standing. Treatments tried: has had pt in the past, not now. The treatment provided mild relief.   Hypertension   This is a chronic problem. The current episode started more than 1 year ago. The problem has been waxing and waning since onset. The problem is uncontrolled. Pertinent negatives include no anxiety, blurred vision, headaches, malaise/fatigue, neck pain, orthopnea, palpitations, peripheral edema, PND or sweats. There are no associated agents to hypertension. Risk factors for coronary artery disease include diabetes mellitus, dyslipidemia, obesity, post-menopausal state, sedentary lifestyle and stress. Past treatments include calcium channel blockers, angiotensin blockers and diuretics. Current antihypertension treatment includes angiotensin blockers, calcium channel blockers and diuretics. The current treatment provides significant improvement. Compliance problems include psychosocial issues.  Hypertensive end-organ damage includes CVA. There is no history of angina, kidney disease, CAD/MI, heart failure, left ventricular hypertrophy, PVD or retinopathy.   Diabetes    She presents for her follow-up diabetic visit. She has type 2 diabetes mellitus. Her disease course has been stable. There are no hypoglycemic associated symptoms. Pertinent negatives for hypoglycemia include no headaches or sweats. Associated symptoms include weakness. Pertinent negatives for diabetes include no blurred vision, no fatigue, no foot paresthesias, no foot ulcerations, no polydipsia, no polyphagia, no polyuria, no visual change and no weight loss. There are no hypoglycemic complications. Symptoms are stable. Diabetic complications include a CVA. Pertinent negatives for diabetic complications include no autonomic neuropathy, heart disease, impotence, nephropathy, peripheral neuropathy, PVD or retinopathy. Risk factors for coronary artery disease include post-menopausal, sedentary lifestyle, hypertension, diabetes mellitus and dyslipidemia. Current diabetic treatment includes oral agent (monotherapy). She is compliant with treatment most of the time. Her weight is stable. She is following a diabetic diet. Meal planning includes avoidance of concentrated sweets. She has not had a previous visit with a dietitian. She rarely participates in exercise. She monitors blood glucose at home 1-2 x per week. (Pt does not recall) An ACE inhibitor/angiotensin II receptor blocker is being taken. She does not see a podiatrist.Eye exam is current.        The following portions of the patient's history were reviewed and updated as appropriate: allergies, current medications, past family history, past medical history, past social history, past surgical history and problem list.    Review of Systems   Constitutional: Negative for chills, diaphoresis, fatigue, fever, malaise/fatigue and unexpected weight loss.   HENT: Negative.  Negative for congestion and sore throat.    Eyes: Negative for blurred vision.   Respiratory: Negative.  Negative for cough.    Cardiovascular: Negative for palpitations, orthopnea and PND.    Gastrointestinal: Negative for abdominal pain, anorexia, change in bowel habit, nausea and vomiting.   Endocrine: Negative for polydipsia, polyphagia and polyuria.   Genitourinary: Negative for impotence.   Musculoskeletal: Positive for arthralgias. Negative for joint swelling and neck pain.   Skin: Negative.  Negative for rash.   Neurological: Positive for weakness. Negative for vertigo and numbness.   Psychiatric/Behavioral: Negative.        Objective   Physical Exam   Constitutional: She is oriented to person, place, and time. She appears well-developed and well-nourished. No distress.   HENT:   Head: Normocephalic and atraumatic.   Eyes: Pupils are equal, round, and reactive to light.   Neck: Normal range of motion. Neck supple. No thyromegaly present.   Cardiovascular: Normal rate, regular rhythm and normal heart sounds. Exam reveals no friction rub.   No murmur heard.  Pulmonary/Chest: Effort normal and breath sounds normal. No stridor. No respiratory distress. She has no wheezes. She has no rales.   Abdominal: Soft.   Musculoskeletal:   Right sided weakness is noted.   Neurological: She is alert and oriented to person, place, and time.   Skin: Skin is dry.   Psychiatric: She has a normal mood and affect. Thought content normal.   Nursing note and vitals reviewed.        Assessment/Plan   Modesta was seen today for cerebrovascular accident, hypertension, diabetes and hyperlipidemia.    Diagnoses and all orders for this visit:    Cerebrovascular accident (CVA), unspecified mechanism (CMS/[facility])  -     clopidogrel (PLAVIX) 75 MG tablet; Take 1 tablet by mouth Daily.    Type 2 diabetes mellitus without complication, without long-term current use of insulin (CMS/[facility])  -     metFORMIN (GLUCOPHAGE) 500 MG tablet; Take 1 tablet by mouth 2 (Two) Times a Day With Meals.    Essential hypertension  -     amLODIPine (NORVASC) 10 MG tablet; Take 1 tablet by mouth Daily.  -     losartan (COZAAR) 100 MG tablet; Take 1  tablet by mouth Daily.  -     spironolactone (ALDACTONE) 25 MG tablet; Take 1 tablet by mouth Daily.    Hyperlipidemia, unspecified hyperlipidemia type  -     atorvastatin (LIPITOR) 40 MG tablet; Take 1 tablet by mouth Daily.    Other orders  -     Ferrous Sulfate (IRON) 325 (65 Fe) MG tablet; Take 1 tablet by mouth 2 (Two) Times a Day.  -     tiZANidine (ZANAFLEX) 4 MG tablet; Take 1 tablet by mouth 3 (Three) Times a Day.  -     vitamin D (ERGOCALCIFEROL) 45804 units capsule capsule; Take 1 capsule by mouth Every 7 (Seven) Days.        Ms rodriguez would benefit from assistance at home.  The paperwork will be filled out.

## 2019-03-29 RX ORDER — PREDNISONE 10 MG/1
TABLET ORAL
Qty: 30 TABLET | Refills: 0 | Status: SHIPPED | OUTPATIENT
Start: 2019-03-29 | End: 2019-04-03 | Stop reason: SDUPTHER

## 2019-04-03 RX ORDER — PREDNISONE 10 MG/1
TABLET ORAL
Qty: 30 TABLET | Refills: 0 | Status: SHIPPED | OUTPATIENT
Start: 2019-04-03 | End: 2019-05-03

## 2019-05-02 ENCOUNTER — OFFICE VISIT (OUTPATIENT)
Dept: FAMILY MEDICINE CLINIC | Facility: CLINIC | Age: 54
End: 2019-05-02

## 2019-05-02 ENCOUNTER — LAB (OUTPATIENT)
Dept: LAB | Facility: HOSPITAL | Age: 54
End: 2019-05-02

## 2019-05-02 VITALS
OXYGEN SATURATION: 98 % | HEART RATE: 101 BPM | TEMPERATURE: 97.2 F | DIASTOLIC BLOOD PRESSURE: 98 MMHG | SYSTOLIC BLOOD PRESSURE: 144 MMHG | HEIGHT: 60 IN | BODY MASS INDEX: 29.45 KG/M2 | WEIGHT: 150 LBS | RESPIRATION RATE: 20 BRPM

## 2019-05-02 DIAGNOSIS — E11.9 TYPE 2 DIABETES MELLITUS WITHOUT COMPLICATION, WITHOUT LONG-TERM CURRENT USE OF INSULIN (HCC): ICD-10-CM

## 2019-05-02 DIAGNOSIS — D64.9 ANEMIA, UNSPECIFIED TYPE: ICD-10-CM

## 2019-05-02 DIAGNOSIS — I10 ESSENTIAL HYPERTENSION: ICD-10-CM

## 2019-05-02 DIAGNOSIS — I63.9 CEREBROVASCULAR ACCIDENT (CVA), UNSPECIFIED MECHANISM (HCC): Primary | ICD-10-CM

## 2019-05-02 PROCEDURE — 83036 HEMOGLOBIN GLYCOSYLATED A1C: CPT

## 2019-05-02 PROCEDURE — 84466 ASSAY OF TRANSFERRIN: CPT | Performed by: NURSE PRACTITIONER

## 2019-05-02 PROCEDURE — 80053 COMPREHEN METABOLIC PANEL: CPT | Performed by: NURSE PRACTITIONER

## 2019-05-02 PROCEDURE — 99214 OFFICE O/P EST MOD 30 MIN: CPT | Performed by: NURSE PRACTITIONER

## 2019-05-02 PROCEDURE — 85025 COMPLETE CBC W/AUTO DIFF WBC: CPT | Performed by: NURSE PRACTITIONER

## 2019-05-02 PROCEDURE — 82607 VITAMIN B-12: CPT | Performed by: NURSE PRACTITIONER

## 2019-05-02 PROCEDURE — 83540 ASSAY OF IRON: CPT | Performed by: NURSE PRACTITIONER

## 2019-05-03 LAB
ALBUMIN SERPL-MCNC: 4.3 G/DL (ref 3.5–5.2)
ALBUMIN/GLOB SERPL: 1.4 G/DL
ALP SERPL-CCNC: 122 U/L (ref 39–117)
ALT SERPL W P-5'-P-CCNC: 15 U/L (ref 1–33)
ANION GAP SERPL CALCULATED.3IONS-SCNC: 9.5 MMOL/L
AST SERPL-CCNC: 18 U/L (ref 1–32)
BASOPHILS # BLD AUTO: 0.08 10*3/MM3 (ref 0–0.2)
BASOPHILS NFR BLD AUTO: 0.9 % (ref 0–1.5)
BILIRUB SERPL-MCNC: 0.9 MG/DL (ref 0.2–1.2)
BUN BLD-MCNC: 20 MG/DL (ref 6–20)
BUN/CREAT SERPL: 26.7 (ref 7–25)
CALCIUM SPEC-SCNC: 9.6 MG/DL (ref 8.6–10.5)
CHLORIDE SERPL-SCNC: 103 MMOL/L (ref 98–107)
CO2 SERPL-SCNC: 26.5 MMOL/L (ref 22–29)
CREAT BLD-MCNC: 0.75 MG/DL (ref 0.57–1)
DEPRECATED RDW RBC AUTO: 43.3 FL (ref 37–54)
EOSINOPHIL # BLD AUTO: 0.18 10*3/MM3 (ref 0–0.4)
EOSINOPHIL NFR BLD AUTO: 2 % (ref 0.3–6.2)
ERYTHROCYTE [DISTWIDTH] IN BLOOD BY AUTOMATED COUNT: 12.7 % (ref 12.3–15.4)
GFR SERPL CREATININE-BSD FRML MDRD: 98 ML/MIN/1.73
GLOBULIN UR ELPH-MCNC: 3.1 GM/DL
GLUCOSE BLD-MCNC: 106 MG/DL (ref 65–99)
HBA1C MFR BLD: 5.92 % (ref 4.8–5.6)
HCT VFR BLD AUTO: 37.9 % (ref 34–46.6)
HGB BLD-MCNC: 12.4 G/DL (ref 12–15.9)
IMM GRANULOCYTES # BLD AUTO: 0.04 10*3/MM3 (ref 0–0.05)
IMM GRANULOCYTES NFR BLD AUTO: 0.4 % (ref 0–0.5)
IRON 24H UR-MRATE: 40 MCG/DL (ref 37–145)
IRON SATN MFR SERPL: 11 % (ref 20–50)
LYMPHOCYTES # BLD AUTO: 1.98 10*3/MM3 (ref 0.7–3.1)
LYMPHOCYTES NFR BLD AUTO: 21.5 % (ref 19.6–45.3)
MCH RBC QN AUTO: 30.6 PG (ref 26.6–33)
MCHC RBC AUTO-ENTMCNC: 32.7 G/DL (ref 31.5–35.7)
MCV RBC AUTO: 93.6 FL (ref 79–97)
MONOCYTES # BLD AUTO: 0.49 10*3/MM3 (ref 0.1–0.9)
MONOCYTES NFR BLD AUTO: 5.3 % (ref 5–12)
NEUTROPHILS # BLD AUTO: 6.45 10*3/MM3 (ref 1.7–7)
NEUTROPHILS NFR BLD AUTO: 69.9 % (ref 42.7–76)
NRBC BLD AUTO-RTO: 0 /100 WBC (ref 0–0.2)
PLATELET # BLD AUTO: 385 10*3/MM3 (ref 140–450)
PMV BLD AUTO: 11.9 FL (ref 6–12)
POTASSIUM BLD-SCNC: 3.7 MMOL/L (ref 3.5–5.2)
PROT SERPL-MCNC: 7.4 G/DL (ref 6–8.5)
RBC # BLD AUTO: 4.05 10*6/MM3 (ref 3.77–5.28)
SODIUM BLD-SCNC: 139 MMOL/L (ref 136–145)
TIBC SERPL-MCNC: 370 MCG/DL (ref 298–536)
TRANSFERRIN SERPL-MCNC: 248 MG/DL (ref 200–360)
VIT B12 BLD-MCNC: 750 PG/ML (ref 211–946)
WBC NRBC COR # BLD: 9.22 10*3/MM3 (ref 3.4–10.8)

## 2019-05-03 NOTE — PROGRESS NOTES
Subjective   Modesta Kaur is a 53 y.o. female.     Here today for zhanna on her b/p.  She has had a cva a few months ago that caused right sided weakness.  She is doing well at this time.  B/s is running under 130.      Cerebrovascular Accident   This is a chronic problem. The current episode started more than 1 month ago. The problem occurs constantly. The problem has been waxing and waning. Associated symptoms include arthralgias, fatigue, numbness and weakness. Pertinent negatives include no abdominal pain, anorexia, change in bowel habit, chest pain, chills, congestion, coughing, diaphoresis, fever, headaches, joint swelling, myalgias, nausea, neck pain, rash, sore throat, swollen glands, urinary symptoms, vertigo, visual change or vomiting. Nothing aggravates the symptoms. Treatments tried: controll of b/p and takes plavix. The treatment provided significant relief.   Hypertension   This is a chronic problem. The current episode started more than 1 year ago. The problem is controlled. Pertinent negatives include no anxiety, blurred vision, chest pain, headaches, malaise/fatigue, neck pain, orthopnea, palpitations, peripheral edema, PND, shortness of breath or sweats. There are no associated agents to hypertension. Risk factors for coronary artery disease include diabetes mellitus, dyslipidemia, obesity and sedentary lifestyle. Past treatments include calcium channel blockers, angiotensin blockers and diuretics. Current antihypertension treatment includes angiotensin blockers, calcium channel blockers and diuretics. The current treatment provides significant improvement. There are no compliance problems.  Hypertensive end-organ damage includes CAD/MI and CVA. There is no history of angina, kidney disease, heart failure, left ventricular hypertrophy, PVD or retinopathy.   Diabetes   She presents for her follow-up diabetic visit. She has type 2 diabetes mellitus. Her disease course has been fluctuating. There are  no hypoglycemic associated symptoms. Pertinent negatives for hypoglycemia include no headaches or sweats. Associated symptoms include fatigue and weakness. Pertinent negatives for diabetes include no blurred vision, no chest pain and no visual change. Symptoms are stable. Diabetic complications include a CVA. Pertinent negatives for diabetic complications include no PVD or retinopathy. Risk factors for coronary artery disease include diabetes mellitus, dyslipidemia, hypertension, obesity, post-menopausal and sedentary lifestyle. Current diabetic treatment includes oral agent (monotherapy). She is compliant with treatment all of the time. Her weight is stable. She is following a diabetic diet. Meal planning includes avoidance of concentrated sweets. She has not had a previous visit with a dietitian. She never participates in exercise. She monitors blood glucose at home 1-2 x per day. Her breakfast blood glucose is taken between 7-8 am. Her breakfast blood glucose range is generally 130-140 mg/dl. Her overall blood glucose range is 130-140 mg/dl. An ACE inhibitor/angiotensin II receptor blocker is being taken. She does not see a podiatrist.Eye exam is current.        The following portions of the patient's history were reviewed and updated as appropriate: allergies, current medications, past family history, past medical history, past social history, past surgical history and problem list.    Review of Systems   Constitutional: Positive for fatigue. Negative for chills, diaphoresis, fever and malaise/fatigue.   HENT: Negative.  Negative for congestion and sore throat.    Eyes: Negative.  Negative for blurred vision.   Respiratory: Negative.  Negative for cough and shortness of breath.    Cardiovascular: Negative.  Negative for chest pain, palpitations, orthopnea and PND.   Gastrointestinal: Negative.  Negative for abdominal pain, anorexia, change in bowel habit, nausea and vomiting.   Endocrine: Negative.     Genitourinary: Negative.    Musculoskeletal: Positive for arthralgias. Negative for joint swelling, myalgias and neck pain.   Skin: Negative.  Negative for rash.   Allergic/Immunologic: Negative.    Neurological: Positive for weakness and numbness. Negative for vertigo.   Hematological: Negative.    Psychiatric/Behavioral: Negative.        Objective   Physical Exam   Constitutional: She is oriented to person, place, and time. She appears well-developed and well-nourished. No distress.   HENT:   Head: Normocephalic and atraumatic.   Mouth/Throat: No oropharyngeal exudate.   Eyes: Pupils are equal, round, and reactive to light.   Neck: Normal range of motion. Neck supple. No thyromegaly present.   Cardiovascular: Normal rate, regular rhythm and normal heart sounds. Exam reveals no friction rub.   No murmur heard.  Pulmonary/Chest: Effort normal and breath sounds normal. No respiratory distress. She has no wheezes. She has no rales.   Abdominal: Soft.   Musculoskeletal: Normal range of motion.   Right sided weakness due to cva    Modesta had a diabetic foot exam performed today.    Neurological Sensory Findings -  Altered sharp/dull right ankle/foot discrimination and altered sharp/dull left ankle/foot discrimination.  Vascular Status -  Her right foot exhibits normal foot vasculature  and no edema. Her left foot exhibits normal foot vasculature  and no edema.  Skin Integrity  -  Her right foot skin is intact.Her left foot skin is intact..  Neurological: She is alert and oriented to person, place, and time.   Skin: Skin is warm and dry.   Psychiatric: She has a normal mood and affect. Thought content normal.   Nursing note and vitals reviewed.        Assessment/Plan   Modesta was seen today for cerebrovascular accident.    Diagnoses and all orders for this visit:    Cerebrovascular accident (CVA), unspecified mechanism (CMS/[facility])    Anemia, unspecified type  -     Vitamin B12  -     CBC & Differential  -     Iron  Profile  -     CBC Auto Differential    Essential hypertension  -     Comprehensive metabolic panel    Type 2 diabetes mellitus without complication, without long-term current use of insulin (CMS/[facility])  -     Cancel: Hemoglobin A1c

## 2019-05-13 ENCOUNTER — TELEPHONE (OUTPATIENT)
Dept: FAMILY MEDICINE CLINIC | Facility: CLINIC | Age: 54
End: 2019-05-13

## 2019-05-13 DIAGNOSIS — I63.9 CEREBROVASCULAR ACCIDENT (CVA), UNSPECIFIED MECHANISM (HCC): Primary | ICD-10-CM

## 2019-05-13 NOTE — TELEPHONE ENCOUNTER
Ms Kaur's son Carlos came in and was asking if you would put in another referral for Ms. Kaur for more occupation therapy to help with her balance,  You can call him at [phone] or [phone]

## 2019-05-23 ENCOUNTER — TELEPHONE (OUTPATIENT)
Dept: FAMILY MEDICINE CLINIC | Facility: CLINIC | Age: 54
End: 2019-05-23

## 2019-05-24 ENCOUNTER — TELEPHONE (OUTPATIENT)
Dept: FAMILY MEDICINE CLINIC | Facility: CLINIC | Age: 54
End: 2019-05-24

## 2019-05-24 DIAGNOSIS — I10 ESSENTIAL HYPERTENSION: ICD-10-CM

## 2019-05-24 NOTE — TELEPHONE ENCOUNTER
[facility] needed confirmation she was suppose to be taking certain medications

## 2019-05-24 NOTE — TELEPHONE ENCOUNTER
[facility] called again is needing a updated meds list on [facility]. Urgent he is waiting so he can get these meds out.   She is a new patient to them

## 2019-05-24 NOTE — TELEPHONE ENCOUNTER
[facility] left a voicemail and would like for you to give them a call back regarding patient's medication.

## 2019-06-03 ENCOUNTER — HOSPITAL ENCOUNTER (OUTPATIENT)
Dept: PHYSICAL THERAPY | Facility: HOSPITAL | Age: 54
Setting detail: THERAPIES SERIES
Discharge: HOME | End: 2019-06-03

## 2019-06-03 DIAGNOSIS — I63.9 CEREBROVASCULAR ACCIDENT (CVA), UNSPECIFIED MECHANISM (HCC): Primary | ICD-10-CM

## 2019-06-03 PROCEDURE — 97162 PT EVAL MOD COMPLEX 30 MIN: CPT | Performed by: PHYSICAL THERAPIST

## 2019-06-03 NOTE — THERAPY EVALUATION
Outpatient Physical Therapy Ortho Initial Evaluation  [facility]     Patient Name: Modesta Kaur  : 1965  MRN: [id number]  Today's Date: 6/3/2019      Visit Date: 2019  Visit   Return to MD: QUIN  Re-certification date: N/A    Patient Active Problem List   Diagnosis   • Type 2 diabetes mellitus without complication, without long-term current use of insulin (CMS/HCC)   • Mild intermittent asthma without complication   • Essential hypertension   • Seasonal allergic rhinitis due to pollen   • Need for hepatitis C screening test   • Cerebrovascular accident (CMS/HCC)        Past Medical History:   Diagnosis Date   • Allergic rhinitis    • Asthma    • Cellulitis of other sites     right foot,resolved   • Cellulitis of other sites - right foot, resolved    • Cerebrovascular accident (CMS/HCC)    • Essential hypertension    • Other specified local infections of the skin and subcutaneous tissue - right foot.    • Rash and other nonspecific skin eruption    • Right hemiparesis (CMS/HCC)    • Streptococcal sore throat    • Stroke (CMS/HCC)    • Type 2 diabetes mellitus (CMS/HCC)    • URI (upper respiratory infection)         History reviewed. No pertinent surgical history.    Visit Dx:     ICD-10-CM ICD-9-CM   1. Cerebrovascular accident (CVA), unspecified mechanism (CMS/HCC) I63.9 434.91     Medications (Admitted on 6/3/2019)     amLODIPine (NORVASC) 10 MG tablet     atorvastatin (LIPITOR) 40 MG tablet     Blood Glucose Monitoring Suppl w/Device kit     cetirizine (zyrTEC) 10 MG tablet     clopidogrel (PLAVIX) 75 MG tablet     Ferrous Sulfate (IRON) 325 (65 Fe) MG tablet     glucose blood test strip     Lancets 30G misc     losartan (COZAAR) 100 MG tablet     metFORMIN (GLUCOPHAGE) 500 MG tablet     omeprazole (priLOSEC) 20 MG capsule     potassium chloride (K-DUR) 10 MEQ CR tablet     Probiotic Product (PROBIOTIC-10 PO)     spironolactone (ALDACTONE) 25 MG tablet     tiZANidine  (ZANAFLEX) 4 MG tablet     vitamin D (ERGOCALCIFEROL) 51227 units capsule capsule        Allergies: Lisinopril, Triamterene      PT Ortho     Row Name 19 1300       Subjective Comments    Subjective Comments  52 yo female with old CVA with R hemiparesis in 2018, having just completed home health PT for the past 3 weeks,  authorization with skilled PT in home health last week, seeing PT for 3 weeks before her for more therap.   -BS       Precautions and Contraindications    Precautions/Limitations  fall precautions  -BS    Precautions  old CVA w/ R hemiparesis, uses AFO w/ R LE  -BS       Subjective Pain    Able to rate subjective pain?  no  -BS    Pre-Treatment Pain Level  0  -BS    Post-Treatment Pain Level  0  -BS       Posture/Observations    Posture/Observations Comments  ambulating with NBQC and ataxic gait pattern with R LE  -BS       General ROM    GENERAL ROM COMMENTS  B LE's grossly WFL for PROM, limited R ankle DF (grossly)  -BS       MMT (Manual Muscle Testing)    General MMT Comments  R LE-hip flex 3+/5 hip abd 3+/5 knee ext 4+/5 knee flex 3-/5 ankle DF 3-/5 inv/lucien 0/5 (both) PF 0/5; L LE-hip flex 5/5 knee 5/5 ankle 5/5 (all met   -BS       Sensation    Light Touch  Severe deficits in the RLE  -BS    Additional Comments  ataxic R LE, hypertonicity with R elbow to passive extension, intact tone with R LE  -BS       Transfers    Comment (Transfers)  maxA prone to supine transition, modA supine to sit to the edge of the mat  -BS       Gait/Stairs Assessment/Training    Comment (Gait/Stairs)  30 sec sit to stand test-8 reps w/ B UE support  -BS      User Key  (r) = Recorded By, (t) = Taken By, (c) = Cosigned By    Initials Name Provider Type    Matt Muñiz, PT Physical Therapist                      Therapy Education  Education Details: HEP: prone HS curls (assisted), LAQ's, pt ed on possible benefit of SPC  Given: HEP, Fall prevention and home safety  Program: New  How Provided: Verbal,  Demonstration  Provided to: Patient  Level of Understanding: Teach back education performed     PT OP Goals     Row Name 06/03/19 1400 06/03/19 1348       PT Short Term Goals    STG Date to Achieve  --  06/14/19  -BS    STG 1  --  Pt independent with HEP for R LE strengthening  -BS    STG 1 Progress  --  New  -BS       Time Calculation    PT Goal Re-Cert Due Date  -- N/A  -BS  -- nA?  -BS      User Key  (r) = Recorded By, (t) = Taken By, (c) = Cosigned By    Initials Name Provider Type    Matt Muñiz, PT Physical Therapist          PT Assessment/Plan     Row Name 06/03/19 1500          PT Assessment    Functional Limitations  Impaired gait;Performance in work activities;Performance in sport activities;Performance in self-care ADL;Performance in leisure activities;Limitations in community activities;Limitation in home management  -BS     Impairments  Balance;Cognition;Coordination;Endurance;Gait;Sensation;Range of motion;Posture;Muscle strength;Motor function  -BS     Assessment Comments  old CVA with severe R LE hemiparesis. No funtional improvement since the time she was discharged back in Nov 2018. Deficits in terms of ambulation and transfers unchanged. May benefit from use of straight cane as pt does not correctly use a quad cane, tending to balance self with use of only 2 of the 4 points on the QC.   -BS     Please refer to paper survey for additional self-reported information  Yes  -BS     Rehab Potential  Poor  -BS     Patient/caregiver participated in establishment of treatment plan and goals  Yes  -BS     Patient would benefit from skilled therapy intervention  Yes  -BS        PT Plan    PT Frequency  2x/week  -BS     Predicted Duration of Therapy Intervention (Therapy Eval)  1 week, eval + 1 follow up session  -BS     Planned CPT's?  PT EVAL MOD COMPLELITY: 30678;PT THER PROC EA 15 MIN: 15799;PT THER ACT EA 15 MIN: 66759;PT NEUROMUSC RE-EDUCATION EA 15 MIN: 39211;PT GAIT TRAINING EA 15 MIN: 48693   -BS     Physical Therapy Interventions (Optional Details)  balance training;gait training;home exercise program;neuromuscular re-education;patient/family education;strengthening;stretching;transfer training  -BS     PT Plan Comments  d/c after f/u session to review HEP, address hamstring and general LE weakness.  -BS       User Key  (r) = Recorded By, (t) = Taken By, (c) = Cosigned By    Initials Name Provider Type    Matt Muñiz, PT Physical Therapist            Exercises     Row Name 19 1300             Subjective Comments    Subjective Comments  52 yo female with old CVA with R hemiparesis in 2018, having just completed home health PT for the past 3 weeks,  authorization with skilled PT in home health last week, seeing PT for 3 weeks before her for more therap.   -BS         Subjective Pain    Able to rate subjective pain?  no  -BS      Pre-Treatment Pain Level  0  -BS      Post-Treatment Pain Level  0  -BS         Exercise 1    Exercise Name 1  prone HS curls (assisted)  -BS      Sets 1  1  -BS      Reps 1  10  -BS      Time 1  R only  -BS         Exercise 2    Exercise Name 2  seated R LAQ's  -BS      Sets 2  1  -BS      Reps 2  10  -BS        User Key  (r) = Recorded By, (t) = Taken By, (c) = Cosigned By    Initials Name Provider Type    Matt Muñiz, PT Physical Therapist                        Outcome Measure Options: Quick DASH, Lower Extremity Functional Scale (LEFS)  Quick DASH  Open a tight or new jar.: Unable  Do heavy household chores (e.g., wash walls, wash floors): Severe Difficulty  Carry a shopping bag or briefcase: Unable  Wash your back: Unable  Use a knife to cut food: Unable  Recreational activities in which you take some force or impact through your arm, should or hand (e.g. golf, hammering, tennis, etc.): Unable  During the past week, to what extent has your arm, shoulder, or hand problem interfered with your normal social activites with family, friends, neighbors or  groups?: Extremely  During the past week, were you limited in your work or other regular daily activities as a result of your arm, shoulder or hand problem?: Unable  Arm, Shoulder, or hand pain: Severe  Tingling (pins and needles) in your arm, shoulder, or hand: Moderate  During the past week, how much difficulty have you had sleeping because of the pain in your arm, shoulder or hand?: Moderate Difficiculty  Number of Questions Answered: 11  Quick DASH Score: 86.36  Lower Extremity Functional Index  Any of your usual work, housework or school activities: Extreme difficulty or unable to perform activity  Your usual hobbies, recreational or sporting activities: Extreme difficulty or unable to perform activity  Getting into or out of the bath: Quite a bit of difficulty  Walking between rooms: Quite a bit of difficulty  Putting on your shoes or socks: Extreme difficulty or unable to perform activity  Squatting: Extreme difficulty or unable to perform activity  Lifting an object, like a bag of groceries from the floor: Extreme difficulty or unable to perform activity  Performing light activities around your home: Quite a bit of difficulty  Performing heavy activities around your home: Quite a bit of difficulty  Getting into or out of a car: Moderate difficulty  Walking 2 blocks: Extreme difficulty or unable to perform activity  Walking a mile: Extreme difficulty or unable to perform activity  Going up or down 10 stairs (about 1 flight of stairs): Extreme difficulty or unable to perform activity  Standing for 1 hour: Extreme difficulty or unable to perform activity  Sitting for 1 hour: Extreme difficulty or unable to perform activity  Running on even ground: Extreme difficulty or unable to perform activity  Running on uneven ground: Extreme difficulty or unable to perform activity  Making sharp turns while running fast: Extreme difficulty or unable to perform activity  Hopping: Extreme difficulty or unable to perform  activity  Rolling over in bed: Moderate difficulty  Total: 8      Time Calculation:     Start Time: 1348  Stop Time: 1438  Time Calculation (min): 50 min  Total Timed Code Minutes- PT: 50 minute(s)     Therapy Charges for Today     Code Description Service Date Service Provider Modifiers Qty    [id number] HC PT EVAL MOD COMPLEXITY 3 6/3/2019 Matt Miller, PT GP 1          PT G-Codes  Outcome Measure Options: Quick DASH, Lower Extremity Functional Scale (LEFS)  Quick DASH Score: 86.36  Total: 8         Matt Miller, PT  6/3/2019

## 2019-06-05 ENCOUNTER — HOSPITAL ENCOUNTER (OUTPATIENT)
Dept: PHYSICAL THERAPY | Facility: HOSPITAL | Age: 54
Setting detail: THERAPIES SERIES
Discharge: HOME | End: 2019-06-05

## 2019-06-05 DIAGNOSIS — G81.91 RIGHT HEMIPARESIS (HCC): ICD-10-CM

## 2019-06-05 DIAGNOSIS — I63.9 CEREBROVASCULAR ACCIDENT (CVA), UNSPECIFIED MECHANISM (HCC): Primary | ICD-10-CM

## 2019-06-05 PROCEDURE — 97110 THERAPEUTIC EXERCISES: CPT

## 2019-06-05 NOTE — THERAPY DISCHARGE NOTE
Outpatient Physical Therapy Ortho Treatment Note/Discharge Summary  [facility]  Irene Gaines PTA       Patient Name: Modesta Kaur  : 1965  MRN: [id number]  Today's Date: 2019      Visit Date: 2019     Visits: 2/2  Insurance Visits Approved:   Recert Due: N/A  MD Appt: TBD  Pain: pretreatment 0/10; post treatment 0/10  Improvement: pt is subjectively reporting N/A% improvement since initial evaluation    Visit Dx:    ICD-10-CM ICD-9-CM   1. Cerebrovascular accident (CVA), unspecified mechanism (CMS/HCC) I63.9 434.91   2. Right hemiparesis (CMS/HCC) G81.91 342.90       Patient Active Problem List   Diagnosis   • Type 2 diabetes mellitus without complication, without long-term current use of insulin (CMS/HCC)   • Mild intermittent asthma without complication   • Essential hypertension   • Seasonal allergic rhinitis due to pollen   • Need for hepatitis C screening test   • Cerebrovascular accident (CMS/HCC)        Past Medical History:   Diagnosis Date   • Allergic rhinitis    • Asthma    • Cellulitis of other sites     right foot,resolved   • Cellulitis of other sites - right foot, resolved    • Cerebrovascular accident (CMS/HCC)    • Essential hypertension    • Other specified local infections of the skin and subcutaneous tissue - right foot.    • Rash and other nonspecific skin eruption    • Right hemiparesis (CMS/HCC)    • Streptococcal sore throat    • Stroke (CMS/HCC)    • Type 2 diabetes mellitus (CMS/HCC)    • URI (upper respiratory infection)         No past surgical history on file.    PT Ortho     Row Name 19 1000       Subjective Comments    Subjective Comments  states that she is doing fine. has had no changes over the last year. knows kristyn ttoday is her only visit.   -       Precautions and Contraindications    Precautions/Limitations  fall precautions  -    Precautions  old CVA w/ R hemiparesis, uses AFO w/ R LE  -MH       Subjective Pain    Able to  rate subjective pain?  yes  -    Pre-Treatment Pain Level  0  -       Posture/Observations    Posture/Observations Comments  ambulates with quad cane only 1 point touching the ground.   -    Row Name 19 1300       Subjective Comments    Subjective Comments  54 yo female with old CVA with R hemiparesis in 2018, having just completed home health PT for the past 3 weeks,  authorization with skilled PT in home health last week, seeing PT for 3 weeks before her for more therap.   -BS       Precautions and Contraindications    Precautions/Limitations  fall precautions  -BS    Precautions  old CVA w/ R hemiparesis, uses AFO w/ R LE  -BS       Subjective Pain    Able to rate subjective pain?  no  -BS    Pre-Treatment Pain Level  0  -BS    Post-Treatment Pain Level  0  -BS       Posture/Observations    Posture/Observations Comments  ambulating with NBQC and ataxic gait pattern with R LE  -BS       General ROM    GENERAL ROM COMMENTS  B LE's grossly WFL for PROM, limited R ankle DF (grossly)  -BS       MMT (Manual Muscle Testing)    General MMT Comments  R LE-hip flex 3+/5 hip abd 3+/5 knee ext 4+/5 knee flex 3-/5 ankle DF 3-/5 inv/lucien 0/5 (both) PF 0/5; L LE-hip flex 5/5 knee 5/5 ankle 5/5 (all met   -BS       Sensation    Light Touch  Severe deficits in the RLE  -BS    Additional Comments  ataxic R LE, hypertonicity with R elbow to passive extension, intact tone with R LE  -BS       Transfers    Comment (Transfers)  maxA prone to supine transition, modA supine to sit to the edge of the mat  -BS       Gait/Stairs Assessment/Training    Comment (Gait/Stairs)  30 sec sit to stand test-8 reps w/ B UE support  -BS      User Key  (r) = Recorded By, (t) = Taken By, (c) = Cosigned By    Initials Name Provider Type     Irene Gaines, PTA Physical Therapy Assistant    Matt Muñiz, PT Physical Therapist                      PT Assessment/Plan     Row Name 19 1100          PT Assessment    Assessment  Comments  patient has no difficulty with all therex issued today and completed today. good effort. independent in current HEP   -        PT Plan    PT Frequency  2x/week  -     Predicted Duration of Therapy Intervention (Therapy Eval)  1 week, eval + 1 follow up session  -     PT Plan Comments  DC per POC  -       User Key  (r) = Recorded By, (t) = Taken By, (c) = Cosigned By    Initials Name Provider Type     Irene Gaines, NAI Physical Therapy Assistant          Modalities     Row Name 06/05/19 1000             Subjective Pain    Post-Treatment Pain Level  0  -        User Key  (r) = Recorded By, (t) = Taken By, (c) = Cosigned By    Initials Name Provider Type     Irene Gaines, PTA Physical Therapy Assistant          Exercises     Row Name 06/05/19 1000             Subjective Comments    Subjective Comments  states that she is doing fine. has had no changes over the last year. knows kristyn ttodacarmelo is her only visit.   -         Subjective Pain    Able to rate subjective pain?  yes  -      Pre-Treatment Pain Level  0  -      Post-Treatment Pain Level  0  -         Exercise 1    Exercise Name 1  Pro II UE/LE  -MH      Reps 1  10 minutes  -      Time 1  L 5.0  -MH      Additional Comments  plastic wrap for UE and LE  -MH         Exercise 2    Exercise Name 2  Seated Hamcurls Tband resist  -MH      Sets 2  2  -MH      Reps 2  10  -MH      Additional Comments  Green  -MH         Exercise 3    Exercise Name 3  Seated LAQ Tband Resist  -MH      Sets 3  2  -MH      Reps 3  10  -MH      Additional Comments  Red  -MH         Exercise 4    Exercise Name 4  Seated Hip ADD Squeeze Tband resist  -MH      Sets 4  2  -MH      Reps 4  10  -MH      Additional Comments  Red  -MH         Exercise 5    Exercise Name 5  Seated Marching  -MH      Sets 5  2  -MH      Reps 5  10  -MH         Exercise 6    Exercise Name 6  Prone Hamcurls  -MH      Sets 6  2  -MH      Reps 6  10  -MH         Exercise 7    Exercise  Name 7  Prone TKE  -MH      Reps 7  30  -MH      Time 7  5 sec hold  -MH         Exercise 8    Exercise Name 8  Bridges  -MH      Reps 8  20  -MH      Time 8  5 sec hold  -MH         Exercise 9    Exercise Name 9  BKLL  -MH      Reps 9  20  -MH         Exercise 10    Exercise Name 10  Sit to/from Stand  -      Reps 10  20  -MH         Exercise 11    Exercise Name 11  Standing Marching  -      Reps 11  20  -MH         Exercise 12    Exercise Name 12  Standing Hip 3 way  -      Reps 12  20  -MH        User Key  (r) = Recorded By, (t) = Taken By, (c) = Cosigned By    Initials Name Provider Type     Irene Gaines PTA Physical Therapy Assistant                         PT OP Goals     Row Name 06/05/19 1100          PT Short Term Goals    STG Date to Achieve  06/14/19  -     STG 1  Pt independent with HEP for R LE strengthening  -     STG 1 Progress  Met  -        Time Calculation    PT Goal Re-Cert Due Date  -- N/A  -       User Key  (r) = Recorded By, (t) = Taken By, (c) = Cosigned By    Initials Name Provider Type     Irene Gaines PTA Physical Therapy Assistant          Therapy Education  Education Details: all therex completed today issued for HEP  Given: HEP  Program: New, Reinforced  How Provided: Verbal, Demonstration, Written  Provided to: Patient  Level of Understanding: Teach back education performed, Verbalized, Demonstrated              Time Calculation:   Start Time: 1100  Stop Time: 1210  Time Calculation (min): 70 min  Total Timed Code Minutes- PT: 70 minute(s)  Therapy Charges for Today     Code Description Service Date Service Provider Modifiers Qty    04024206331 HC PT THER PROC EA 15 MIN 6/5/2019 Irene Gaines PTA GP 5    [id number] HC PT THER SUPP EA 15 MIN 6/5/2019 Irene Gaines PTA GP 1                OP PT Discharge Summary  Date of Discharge: (N/A)  Reason for Discharge: Maximum functional potential achieved  Outcomes Achieved: Able to achieve all goals within  established timeline  Discharge Destination: Home without follow-up      Irene Gaines, PTA  6/5/2019

## 2019-06-06 ENCOUNTER — APPOINTMENT (OUTPATIENT)
Dept: PHYSICAL THERAPY | Facility: HOSPITAL | Age: 54
End: 2019-06-06

## 2019-06-20 RX ORDER — PREDNISONE 10 MG/1
TABLET ORAL
Qty: 30 TABLET | Refills: 3 | Status: SHIPPED | OUTPATIENT
Start: 2019-06-20 | End: 2019-11-01 | Stop reason: SDUPTHER

## 2019-07-18 ENCOUNTER — TELEPHONE (OUTPATIENT)
Dept: FAMILY MEDICINE CLINIC | Facility: CLINIC | Age: 54
End: 2019-07-18

## 2019-07-18 DIAGNOSIS — I63.9 CEREBROVASCULAR ACCIDENT (CVA), UNSPECIFIED MECHANISM (HCC): ICD-10-CM

## 2019-07-18 DIAGNOSIS — I63.9 CEREBROVASCULAR ACCIDENT (CVA), UNSPECIFIED MECHANISM (HCC): Primary | ICD-10-CM

## 2019-07-18 DIAGNOSIS — E78.5 HYPERLIPIDEMIA, UNSPECIFIED HYPERLIPIDEMIA TYPE: ICD-10-CM

## 2019-07-18 RX ORDER — CLOPIDOGREL BISULFATE 75 MG/1
75 TABLET ORAL DAILY
Qty: 30 TABLET | Refills: 5 | Status: SHIPPED | OUTPATIENT
Start: 2019-07-18 | End: 2019-08-05 | Stop reason: SDUPTHER

## 2019-07-18 RX ORDER — ATORVASTATIN CALCIUM 40 MG/1
40 TABLET, FILM COATED ORAL DAILY
Qty: 30 TABLET | Refills: 5 | Status: SHIPPED | OUTPATIENT
Start: 2019-07-18 | End: 2019-08-05 | Stop reason: SDUPTHER

## 2019-07-18 NOTE — TELEPHONE ENCOUNTER
Paola with [facility] called and would like to have a referral for patient for exercise therapy

## 2019-07-19 NOTE — TELEPHONE ENCOUNTER
Paola from [facility] called again regarding Modesta and an order for outpatient therapy. [facility] #[phone]

## 2019-07-23 ENCOUNTER — TELEPHONE (OUTPATIENT)
Dept: FAMILY MEDICINE CLINIC | Facility: CLINIC | Age: 54
End: 2019-07-23

## 2019-07-23 DIAGNOSIS — R53.1 WEAKNESS FOLLOWING CEREBROVASCULAR ACCIDENT (CVA): Primary | ICD-10-CM

## 2019-07-23 DIAGNOSIS — I69.398 WEAKNESS FOLLOWING CEREBROVASCULAR ACCIDENT (CVA): Primary | ICD-10-CM

## 2019-07-31 DIAGNOSIS — E11.9 TYPE 2 DIABETES MELLITUS WITHOUT COMPLICATION, WITHOUT LONG-TERM CURRENT USE OF INSULIN (HCC): ICD-10-CM

## 2019-08-02 ENCOUNTER — OFFICE VISIT (OUTPATIENT)
Dept: FAMILY MEDICINE CLINIC | Facility: CLINIC | Age: 54
End: 2019-08-02

## 2019-08-02 VITALS
HEART RATE: 90 BPM | TEMPERATURE: 97.6 F | DIASTOLIC BLOOD PRESSURE: 83 MMHG | OXYGEN SATURATION: 99 % | WEIGHT: 151 LBS | RESPIRATION RATE: 18 BRPM | BODY MASS INDEX: 29.64 KG/M2 | SYSTOLIC BLOOD PRESSURE: 139 MMHG | HEIGHT: 60 IN

## 2019-08-02 DIAGNOSIS — I63.9 CEREBROVASCULAR ACCIDENT (CVA), UNSPECIFIED MECHANISM (HCC): Primary | ICD-10-CM

## 2019-08-02 DIAGNOSIS — E78.5 HYPERLIPIDEMIA, UNSPECIFIED HYPERLIPIDEMIA TYPE: ICD-10-CM

## 2019-08-02 DIAGNOSIS — I10 ESSENTIAL HYPERTENSION: ICD-10-CM

## 2019-08-02 DIAGNOSIS — E11.9 TYPE 2 DIABETES MELLITUS WITHOUT COMPLICATION, WITHOUT LONG-TERM CURRENT USE OF INSULIN (HCC): ICD-10-CM

## 2019-08-02 DIAGNOSIS — J30.1 SEASONAL ALLERGIC RHINITIS DUE TO POLLEN: ICD-10-CM

## 2019-08-02 PROCEDURE — 99214 OFFICE O/P EST MOD 30 MIN: CPT | Performed by: NURSE PRACTITIONER

## 2019-08-02 RX ORDER — OMEPRAZOLE 20 MG/1
20 CAPSULE, DELAYED RELEASE ORAL DAILY
Qty: 30 CAPSULE | Refills: 3 | Status: SHIPPED | OUTPATIENT
Start: 2019-08-02 | End: 2019-12-03 | Stop reason: SDUPTHER

## 2019-08-02 RX ORDER — AMLODIPINE BESYLATE 10 MG/1
10 TABLET ORAL DAILY
Qty: 30 TABLET | Refills: 0 | Status: SHIPPED | OUTPATIENT
Start: 2019-08-02 | End: 2019-09-19 | Stop reason: SDUPTHER

## 2019-08-02 RX ORDER — ATORVASTATIN CALCIUM 40 MG/1
40 TABLET, FILM COATED ORAL DAILY
Qty: 30 TABLET | Refills: 5 | Status: SHIPPED | OUTPATIENT
Start: 2019-08-02 | End: 2019-12-03 | Stop reason: SDUPTHER

## 2019-08-02 RX ORDER — CETIRIZINE HYDROCHLORIDE 10 MG/1
10 TABLET ORAL DAILY
Qty: 30 TABLET | Refills: 5 | Status: SHIPPED | OUTPATIENT
Start: 2019-08-02 | End: 2019-12-03 | Stop reason: SDUPTHER

## 2019-08-02 RX ORDER — AMLODIPINE BESYLATE 10 MG/1
10 TABLET ORAL DAILY
Qty: 30 TABLET | Refills: 0 | Status: SHIPPED | OUTPATIENT
Start: 2019-08-02 | End: 2019-08-05 | Stop reason: SDUPTHER

## 2019-08-02 RX ORDER — TIZANIDINE 4 MG/1
4 TABLET ORAL 3 TIMES DAILY
Qty: 90 TABLET | Refills: 3 | Status: SHIPPED | OUTPATIENT
Start: 2019-08-02 | End: 2019-12-03 | Stop reason: SDUPTHER

## 2019-08-02 RX ORDER — PNV NO.95/FERROUS FUM/FOLIC AC 28MG-0.8MG
1 TABLET ORAL 2 TIMES DAILY
Qty: 60 TABLET | Refills: 3 | Status: SHIPPED | OUTPATIENT
Start: 2019-08-02 | End: 2019-12-03 | Stop reason: SDUPTHER

## 2019-08-02 RX ORDER — ERGOCALCIFEROL 1.25 MG/1
50000 CAPSULE ORAL
Qty: 4 CAPSULE | Refills: 3 | Status: SHIPPED | OUTPATIENT
Start: 2019-08-02 | End: 2019-12-03 | Stop reason: SDUPTHER

## 2019-08-02 RX ORDER — CLOPIDOGREL BISULFATE 75 MG/1
75 TABLET ORAL DAILY
Qty: 30 TABLET | Refills: 5 | Status: SHIPPED | OUTPATIENT
Start: 2019-08-02 | End: 2019-12-03 | Stop reason: SDUPTHER

## 2019-08-02 RX ORDER — SPIRONOLACTONE 25 MG/1
25 TABLET ORAL DAILY
Qty: 30 TABLET | Refills: 5 | Status: SHIPPED | OUTPATIENT
Start: 2019-08-02 | End: 2019-12-03 | Stop reason: SDUPTHER

## 2019-08-02 RX ORDER — POTASSIUM CHLORIDE 750 MG/1
10 TABLET, FILM COATED, EXTENDED RELEASE ORAL DAILY
Qty: 39 TABLET | Refills: 3 | Status: SHIPPED | OUTPATIENT
Start: 2019-08-02 | End: 2019-12-03 | Stop reason: SDUPTHER

## 2019-08-02 RX ORDER — LOSARTAN POTASSIUM 100 MG/1
100 TABLET ORAL DAILY
Qty: 30 TABLET | Refills: 5 | Status: SHIPPED | OUTPATIENT
Start: 2019-08-02 | End: 2019-12-03 | Stop reason: SDUPTHER

## 2019-08-05 NOTE — PATIENT INSTRUCTIONS
Calorie Counting for Weight Loss  Calories are units of energy. Your body needs a certain amount of calories from food to keep you going throughout the day. When you eat more calories than your body needs, your body stores the extra calories as fat. When you eat fewer calories than your body needs, your body burns fat to get the energy it needs.  Calorie counting means keeping track of how many calories you eat and drink each day. Calorie counting can be helpful if you need to lose weight. If you make sure to eat fewer calories than your body needs, you should lose weight. Ask your health care provider what a healthy weight is for you.  For calorie counting to work, you will need to eat the right number of calories in a day in order to lose a healthy amount of weight per week. A dietitian can help you determine how many calories you need in a day and will give you suggestions on how to reach your calorie goal.  · A healthy amount of weight to lose per week is usually 1-2 lb (0.5-0.9 kg). This usually means that your daily calorie intake should be reduced by 500-750 calories.  · Eating 1,200 - 1,500 calories per day can help most women lose weight.  · Eating 1,500 - 1,800 calories per day can help most men lose weight.  What is my plan?  My goal is to have __________ calories per day.  If I have this many calories per day, I should lose around __________ pounds per week.  What do I need to know about calorie counting?  In order to meet your daily calorie goal, you will need to:  · Find out how many calories are in each food you would like to eat. Try to do this before you eat.  · Decide how much of the food you plan to eat.  · Write down what you ate and how many calories it had. Doing this is called keeping a food log.  To successfully lose weight, it is important to balance calorie counting with a healthy lifestyle that includes regular activity. Aim for 150 minutes of moderate exercise (such as walking) or 75  minutes of vigorous exercise (such as running) each week.  Where do I find calorie information?    The number of calories in a food can be found on a Nutrition Facts label. If a food does not have a Nutrition Facts label, try to look up the calories online or ask your dietitian for help.  Remember that calories are listed per serving. If you choose to have more than one serving of a food, you will have to multiply the calories per serving by the amount of servings you plan to eat. For example, the label on a package of bread might say that a serving size is 1 slice and that there are 90 calories in a serving. If you eat 1 slice, you will have eaten 90 calories. If you eat 2 slices, you will have eaten 180 calories.  How do I keep a food log?  Immediately after each meal, record the following information in your food log:  · What you ate. Don't forget to include toppings, sauces, and other extras on the food.  · How much you ate. This can be measured in cups, ounces, or number of items.  · How many calories each food and drink had.  · The total number of calories in the meal.  Keep your food log near you, such as in a small notebook in your pocket, or use a mobile yolanda or website. Some programs will calculate calories for you and show you how many calories you have left for the day to meet your goal.  What are some calorie counting tips?    · Use your calories on foods and drinks that will fill you up and not leave you hungry:  ? Some examples of foods that fill you up are nuts and nut butters, vegetables, lean proteins, and high-fiber foods like whole grains. High-fiber foods are foods with more than 5 g fiber per serving.  ? Drinks such as sodas, specialty coffee drinks, alcohol, and juices have a lot of calories, yet do not fill you up.  · Eat nutritious foods and avoid empty calories. Empty calories are calories you get from foods or beverages that do not have many vitamins or protein, such as candy, sweets, and  "soda. It is better to have a nutritious high-calorie food (such as an avocado) than a food with few nutrients (such as a bag of chips).  · Know how many calories are in the foods you eat most often. This will help you calculate calorie counts faster.  · Pay attention to calories in drinks. Low-calorie drinks include water and unsweetened drinks.  · Pay attention to nutrition labels for \"low fat\" or \"fat free\" foods. These foods sometimes have the same amount of calories or more calories than the full fat versions. They also often have added sugar, starch, or salt, to make up for flavor that was removed with the fat.  · Find a way of tracking calories that works for you. Get creative. Try different apps or programs if writing down calories does not work for you.  What are some portion control tips?  · Know how many calories are in a serving. This will help you know how many servings of a certain food you can have.  · Use a measuring cup to measure serving sizes. You could also try weighing out portions on a kitchen scale. With time, you will be able to estimate serving sizes for some foods.  · Take some time to put servings of different foods on your favorite plates, bowls, and cups so you know what a serving looks like.  · Try not to eat straight from a bag or box. Doing this can lead to overeating. Put the amount you would like to eat in a cup or on a plate to make sure you are eating the right portion.  · Use smaller plates, glasses, and bowls to prevent overeating.  · Try not to multitask (for example, watch TV or use your computer) while eating. If it is time to eat, sit down at a table and enjoy your food. This will help you to know when you are full. It will also help you to be aware of what you are eating and how much you are eating.  What are tips for following this plan?  Reading food labels  · Check the calorie count compared to the serving size. The serving size may be smaller than what you are used to " eating.  · Check the source of the calories. Make sure the food you are eating is high in vitamins and protein and low in saturated and trans fats.  Shopping  · Read nutrition labels while you shop. This will help you make healthy decisions before you decide to purchase your food.  · Make a grocery list and stick to it.  Cooking  · Try to cook your favorite foods in a healthier way. For example, try baking instead of frying.  · Use low-fat dairy products.  Meal planning  · Use more fruits and vegetables. Half of your plate should be fruits and vegetables.  · Include lean proteins like poultry and fish.  How do I count calories when eating out?  · Ask for smaller portion sizes.  · Consider sharing an entree and sides instead of getting your own entree.  · If you get your own entree, eat only half. Ask for a box at the beginning of your meal and put the rest of your entree in it so you are not tempted to eat it.  · If calories are listed on the menu, choose the lower calorie options.  · Choose dishes that include vegetables, fruits, whole grains, low-fat dairy products, and lean protein.  · Choose items that are boiled, broiled, grilled, or steamed. Stay away from items that are buttered, battered, fried, or served with cream sauce. Items labeled “crispy” are usually fried, unless stated otherwise.  · Choose water, low-fat milk, unsweetened iced tea, or other drinks without added sugar. If you want an alcoholic beverage, choose a lower calorie option such as a glass of wine or light beer.  · Ask for dressings, sauces, and syrups on the side. These are usually high in calories, so you should limit the amount you eat.  · If you want a salad, choose a garden salad and ask for grilled meats. Avoid extra toppings like nunez, cheese, or fried items. Ask for the dressing on the side, or ask for olive oil and vinegar or lemon to use as dressing.  · Estimate how many servings of a food you are given. For example, a serving of  cooked rice is ½ cup or about the size of half a baseball. Knowing serving sizes will help you be aware of how much food you are eating at restaurants. The list below tells you how big or small some common portion sizes are based on everyday objects:  ? 1 oz--4 stacked dice.  ? 3 oz--1 deck of cards.  ? 1 tsp--1 die.  ? 1 Tbsp--½ a ping-pong ball.  ? 2 Tbsp--1 ping-pong ball.  ? ½ cup--½ baseball.  ? 1 cup--1 baseball.  Summary  · Calorie counting means keeping track of how many calories you eat and drink each day. If you eat fewer calories than your body needs, you should lose weight.  · A healthy amount of weight to lose per week is usually 1-2 lb (0.5-0.9 kg). This usually means reducing your daily calorie intake by 500-750 calories.  · The number of calories in a food can be found on a Nutrition Facts label. If a food does not have a Nutrition Facts label, try to look up the calories online or ask your dietitian for help.  · Use your calories on foods and drinks that will fill you up, and not on foods and drinks that will leave you hungry.  · Use smaller plates, glasses, and bowls to prevent overeating.  This information is not intended to replace advice given to you by your health care provider. Make sure you discuss any questions you have with your health care provider.  Document Released: 12/18/2006 Document Revised: 11/17/2017 Document Reviewed: 11/17/2017  [employer] Patient Education © 2019 [employer]      Exercising to Lose Weight  Exercise is structured, repetitive physical activity to improve fitness and health. Getting regular exercise is important for everyone. It is especially important if you are overweight. Being overweight increases your risk of heart disease, stroke, diabetes, high blood pressure, and several types of cancer. Reducing your calorie intake and exercising can help you lose weight.  Exercise is usually categorized as moderate or vigorous intensity. To lose weight, most  people need to do a certain amount of moderate-intensity or vigorous-intensity exercise each week.  Moderate-intensity exercise    Moderate-intensity exercise is any activity that gets you moving enough to burn at least three times more energy (calories) than if you were sitting.  Examples of moderate exercise include:  · Walking a mile in 15 minutes.  · Doing light yard work.  · Biking at an easy pace.  Most people should get at least 150 minutes (2 hours and 30 minutes) a week of moderate-intensity exercise to maintain their body weight.  Vigorous-intensity exercise  Vigorous-intensity exercise is any activity that gets you moving enough to burn at least six times more calories than if you were sitting. When you exercise at this intensity, you should be working hard enough that you are not able to carry on a conversation.  Examples of vigorous exercise include:  · Running.  · Playing a team sport, such as football, basketball, and soccer.  · Jumping rope.  Most people should get at least 75 minutes (1 hour and 15 minutes) a week of vigorous-intensity exercise to maintain their body weight.  How can exercise affect me?  When you exercise enough to burn more calories than you eat, you lose weight. Exercise also reduces body fat and builds muscle. The more muscle you have, the more calories you burn. Exercise also:  · Improves mood.  · Reduces stress and tension.  · Improves your overall fitness, flexibility, and endurance.  · Increases bone strength.  The amount of exercise you need to lose weight depends on:  · Your age.  · The type of exercise.  · Any health conditions you have.  · Your overall physical ability.  Talk to your health care provider about how much exercise you need and what types of activities are safe for you.  What actions can I take to lose weight?  Nutrition    · Make changes to your diet as told by your health care provider or diet and nutrition specialist (dietitian). This may  include:  ? Eating fewer calories.  ? Eating more protein.  ? Eating less unhealthy fats.  ? Eating a diet that includes fresh fruits and vegetables, whole grains, low-fat dairy products, and lean protein.  ? Avoiding foods with added fat, salt, and sugar.  · Drink plenty of water while you exercise to prevent dehydration or heat stroke.  Activity  · Choose an activity that you enjoy and set realistic goals. Your health care provider can help you make an exercise plan that works for you.  · Exercise at a moderate or vigorous intensity most days of the week.  ? The intensity of exercise may vary from person to person. You can tell how intense a workout is for you by paying attention to your breathing and heartbeat. Most people will notice their breathing and heartbeat get faster with more intense exercise.  · Do resistance training twice each week, such as:  ? Push-ups.  ? Sit-ups.  ? Lifting weights.  ? Using resistance bands.  · Getting short amounts of exercise can be just as helpful as long structured periods of exercise. If you have trouble finding time to exercise, try to include exercise in your daily routine.  ? Get up, stretch, and walk around every 30 minutes throughout the day.  ? Go for a walk during your lunch break.  ? Park your car farther away from your destination.  ? If you take public transportation, get off one stop early and walk the rest of the way.  ? Make phone calls while standing up and walking around.  ? Take the stairs instead of elevators or escalators.  · Wear comfortable clothes and shoes with good support.  · Do not exercise so much that you hurt yourself, feel dizzy, or get very short of breath.  Where to find more information  · [place] Department of Health and Human Services: www.hhs.gov  · Centers for Disease Control and Prevention (CDC): www.cdc.gov  Contact a health care provider:  · Before starting a new exercise program.  · If you have questions or concerns about your  weight.  · If you have a medical problem that keeps you from exercising.  Get help right away if you have any of the following while exercising:  · Injury.  · Dizziness.  · Difficulty breathing or shortness of breath that does not go away when you stop exercising.  · Chest pain.  · Rapid heartbeat.  Summary  · Being overweight increases your risk of heart disease, stroke, diabetes, high blood pressure, and several types of cancer.  · Losing weight happens when you burn more calories than you eat.  · Reducing the amount of calories you eat in addition to getting regular moderate or vigorous exercise each week helps you lose weight.  This information is not intended to replace advice given to you by your health care provider. Make sure you discuss any questions you have with your health care provider.  Document Released: 01/20/2012 Document Revised: 12/31/2018 Document Reviewed: 12/31/2018  [employer] Patient Education © 2019 [employer]

## 2019-08-05 NOTE — PROGRESS NOTES
Subjective   Modesta Kaur is a 53 y.o. female.     Here today for zhanna.  She has had a cva and cont to have right hemiparesis.  She does not recall what her b/s is running.  She has no real complaints today.      Cerebrovascular Accident   This is a chronic problem. The current episode started more than 1 year ago. The problem occurs constantly. The problem has been unchanged. Associated symptoms include myalgias and weakness. Pertinent negatives include no abdominal pain, anorexia, arthralgias, change in bowel habit, chest pain, chills, congestion, coughing, diaphoresis, fatigue, fever, headaches, joint swelling, nausea, neck pain, numbness, rash, sore throat, swollen glands, urinary symptoms, vertigo, visual change or vomiting. The symptoms are aggravated by walking, standing, stress and exertion. Treatments tried: has had pt and ot. The treatment provided moderate relief.   Hypertension   This is a chronic problem. The current episode started more than 1 year ago. The problem is unchanged. The problem is controlled. Pertinent negatives include no anxiety, blurred vision, chest pain, headaches, malaise/fatigue, neck pain, orthopnea, palpitations, peripheral edema, PND, shortness of breath or sweats. There are no associated agents to hypertension. Risk factors for coronary artery disease include diabetes mellitus, dyslipidemia, post-menopausal state and sedentary lifestyle. Past treatments include calcium channel blockers, angiotensin blockers and diuretics. Current antihypertension treatment includes angiotensin blockers, calcium channel blockers and diuretics. The current treatment provides significant improvement. There are no compliance problems.  Hypertensive end-organ damage includes CVA. There is no history of angina, kidney disease, CAD/MI, heart failure, left ventricular hypertrophy, PVD or retinopathy.   Diabetes   She presents for her follow-up diabetic visit. She has type 2 diabetes mellitus. Her  disease course has been stable. There are no hypoglycemic associated symptoms. Pertinent negatives for hypoglycemia include no headaches or sweats. Associated symptoms include weakness. Pertinent negatives for diabetes include no blurred vision, no chest pain, no fatigue, no foot paresthesias, no foot ulcerations, no polydipsia, no polyphagia, no polyuria, no visual change and no weight loss. There are no hypoglycemic complications. Symptoms are stable. Diabetic complications include a CVA. Pertinent negatives for diabetic complications include no PVD or retinopathy. Risk factors for coronary artery disease include diabetes mellitus, dyslipidemia, hypertension, sedentary lifestyle and post-menopausal. Current diabetic treatment includes insulin injections. She is compliant with treatment all of the time. Her weight is stable. She is following a diabetic diet. Meal planning includes avoidance of concentrated sweets. She has not had a previous visit with a dietitian. She rarely participates in exercise. She monitors blood glucose at home 1-2 x per week. Blood glucose monitoring compliance is poor. An ACE inhibitor/angiotensin II receptor blocker is being taken. She does not see a podiatrist.Eye exam is current.        The following portions of the patient's history were reviewed and updated as appropriate: allergies, current medications, past family history, past medical history, past social history, past surgical history and problem list.    Review of Systems   Constitutional: Negative for chills, diaphoresis, fatigue, fever, malaise/fatigue and unexpected weight loss.   HENT: Negative.  Negative for congestion and sore throat.    Eyes: Negative.  Negative for blurred vision.   Respiratory: Negative.  Negative for cough and shortness of breath.    Cardiovascular: Negative.  Negative for chest pain, palpitations, orthopnea and PND.   Gastrointestinal: Negative.  Negative for abdominal pain, anorexia, change in bowel  habit, nausea and vomiting.   Endocrine: Negative.  Negative for polydipsia, polyphagia and polyuria.   Genitourinary: Negative.    Musculoskeletal: Positive for myalgias. Negative for arthralgias, joint swelling and neck pain.   Skin: Negative.  Negative for rash.   Allergic/Immunologic: Negative.    Neurological: Positive for weakness. Negative for vertigo and numbness.   Hematological: Negative.    Psychiatric/Behavioral: Negative.        Objective   Physical Exam   Constitutional: She is oriented to person, place, and time. She appears well-developed and well-nourished. No distress.   HENT:   Head: Normocephalic and atraumatic.   Mouth/Throat: No oropharyngeal exudate.   Eyes: Pupils are equal, round, and reactive to light.   Neck: Normal range of motion. Neck supple. No thyromegaly present.   Cardiovascular: Normal rate, regular rhythm and normal heart sounds. Exam reveals no friction rub.   No murmur heard.  Pulmonary/Chest: Effort normal and breath sounds normal. No respiratory distress. She has no wheezes. She has no rales.   Abdominal: Soft.   Musculoskeletal: Normal range of motion.   Right sided weakness due to cva    Modesta had a diabetic foot exam performed today.    Neurological Sensory Findings -  Unaltered sharp/dull right ankle/foot discrimination and unaltered sharp/dull left ankle/foot discrimination.  Vascular Status -  Her right foot exhibits normal foot vasculature  and no edema. Her left foot exhibits normal foot vasculature  and no edema.  Skin Integrity  -  Her right foot skin is intact.Her left foot skin is intact..  Neurological: She is alert and oriented to person, place, and time.   Skin: Skin is warm and dry.   Psychiatric: She has a normal mood and affect. Thought content normal.   Nursing note and vitals reviewed.        Assessment/Plan   Modesta was seen today for cerebrovascular accident, hypertension, diabetes, hyperlipidemia and heartburn.    Diagnoses and all orders for this  visit:    Cerebrovascular accident (CVA), unspecified mechanism (CMS/[facility])  -     clopidogrel (PLAVIX) 75 MG tablet; Take 1 tablet by mouth Daily.    Hyperlipidemia, unspecified hyperlipidemia type  -     atorvastatin (LIPITOR) 40 MG tablet; Take 1 tablet by mouth Daily.    Essential hypertension  -     spironolactone (ALDACTONE) 25 MG tablet; Take 1 tablet by mouth Daily.  -     losartan (COZAAR) 100 MG tablet; Take 1 tablet by mouth Daily.  -     Discontinue: amLODIPine (NORVASC) 10 MG tablet; Take 1 tablet by mouth Daily.  -     amLODIPine (NORVASC) 10 MG tablet; Take 1 tablet by mouth Daily.    Seasonal allergic rhinitis due to pollen  -     cetirizine (zyrTEC) 10 MG tablet; Take 1 tablet by mouth Daily.    Type 2 diabetes mellitus without complication, without long-term current use of insulin (CMS/[facility])  -     metFORMIN (GLUCOPHAGE) 500 MG tablet; Take 1 tablet by mouth 2 (Two) Times a Day With Meals.  -     glucose blood test strip; Check blood sugar once daily for hyperglycemia    BMI 29.0-29.9,adult  Comments:  diet and execise info given    Other orders  -     vitamin D (ERGOCALCIFEROL) 11785 units capsule capsule; Take 1 capsule by mouth Every 7 (Seven) Days.  -     tiZANidine (ZANAFLEX) 4 MG tablet; Take 1 tablet by mouth 3 (Three) Times a Day.  -     omeprazole (priLOSEC) 20 MG capsule; Take 1 capsule by mouth Daily.  -     Lancets 30G misc; 1 each Daily.  -     Ferrous Sulfate (IRON) 325 (65 Fe) MG tablet; Take 1 tablet by mouth 2 (Two) Times a Day.  -     potassium chloride (K-DUR) 10 MEQ CR tablet; Take 1 tablet by mouth Daily.

## 2019-09-19 DIAGNOSIS — I10 ESSENTIAL HYPERTENSION: ICD-10-CM

## 2019-09-20 RX ORDER — AMLODIPINE BESYLATE 10 MG/1
10 TABLET ORAL DAILY
Qty: 30 TABLET | Refills: 5 | Status: SHIPPED | OUTPATIENT
Start: 2019-09-20 | End: 2019-12-03 | Stop reason: SDUPTHER

## 2019-11-01 RX ORDER — PREDNISONE 10 MG/1
TABLET ORAL
Qty: 30 TABLET | Refills: 3 | Status: SHIPPED | OUTPATIENT
Start: 2019-11-01 | End: 2021-06-19

## 2019-12-03 ENCOUNTER — OFFICE VISIT (OUTPATIENT)
Dept: FAMILY MEDICINE CLINIC | Facility: CLINIC | Age: 54
End: 2019-12-03

## 2019-12-03 VITALS
BODY MASS INDEX: 32 KG/M2 | TEMPERATURE: 98.7 F | WEIGHT: 163 LBS | SYSTOLIC BLOOD PRESSURE: 141 MMHG | DIASTOLIC BLOOD PRESSURE: 89 MMHG | HEART RATE: 112 BPM | OXYGEN SATURATION: 98 % | HEIGHT: 60 IN | RESPIRATION RATE: 20 BRPM

## 2019-12-03 DIAGNOSIS — E78.5 HYPERLIPIDEMIA, UNSPECIFIED HYPERLIPIDEMIA TYPE: ICD-10-CM

## 2019-12-03 DIAGNOSIS — E11.9 TYPE 2 DIABETES MELLITUS WITHOUT COMPLICATION, WITHOUT LONG-TERM CURRENT USE OF INSULIN (HCC): Primary | ICD-10-CM

## 2019-12-03 DIAGNOSIS — I10 ESSENTIAL HYPERTENSION: ICD-10-CM

## 2019-12-03 DIAGNOSIS — K21.9 GASTROESOPHAGEAL REFLUX DISEASE, ESOPHAGITIS PRESENCE NOT SPECIFIED: ICD-10-CM

## 2019-12-03 DIAGNOSIS — I63.9 CEREBROVASCULAR ACCIDENT (CVA), UNSPECIFIED MECHANISM (HCC): ICD-10-CM

## 2019-12-03 DIAGNOSIS — J30.1 SEASONAL ALLERGIC RHINITIS DUE TO POLLEN: ICD-10-CM

## 2019-12-03 DIAGNOSIS — E66.9 OBESITY (BMI 30.0-34.9): ICD-10-CM

## 2019-12-03 PROCEDURE — 99214 OFFICE O/P EST MOD 30 MIN: CPT | Performed by: NURSE PRACTITIONER

## 2019-12-03 RX ORDER — SPIRONOLACTONE 25 MG/1
25 TABLET ORAL DAILY
Qty: 30 TABLET | Refills: 5 | Status: SHIPPED | OUTPATIENT
Start: 2019-12-03 | End: 2020-03-17 | Stop reason: SDUPTHER

## 2019-12-03 RX ORDER — PNV NO.95/FERROUS FUM/FOLIC AC 28MG-0.8MG
1 TABLET ORAL 2 TIMES DAILY
Qty: 60 TABLET | Refills: 3 | Status: SHIPPED | OUTPATIENT
Start: 2019-12-03 | End: 2020-03-17 | Stop reason: SDUPTHER

## 2019-12-03 RX ORDER — ERGOCALCIFEROL 1.25 MG/1
50000 CAPSULE ORAL
Qty: 4 CAPSULE | Refills: 3 | Status: SHIPPED | OUTPATIENT
Start: 2019-12-03 | End: 2020-03-17 | Stop reason: SDUPTHER

## 2019-12-03 RX ORDER — ATORVASTATIN CALCIUM 40 MG/1
40 TABLET, FILM COATED ORAL DAILY
Qty: 30 TABLET | Refills: 5 | Status: SHIPPED | OUTPATIENT
Start: 2019-12-03 | End: 2020-03-17 | Stop reason: SDUPTHER

## 2019-12-03 RX ORDER — AMLODIPINE BESYLATE 10 MG/1
10 TABLET ORAL DAILY
Qty: 30 TABLET | Refills: 5 | Status: SHIPPED | OUTPATIENT
Start: 2019-12-03 | End: 2020-03-17 | Stop reason: SDUPTHER

## 2019-12-03 RX ORDER — LOSARTAN POTASSIUM 100 MG/1
100 TABLET ORAL DAILY
Qty: 30 TABLET | Refills: 5 | Status: SHIPPED | OUTPATIENT
Start: 2019-12-03 | End: 2020-03-17 | Stop reason: SDUPTHER

## 2019-12-03 RX ORDER — TIZANIDINE 4 MG/1
4 TABLET ORAL 3 TIMES DAILY
Qty: 90 TABLET | Refills: 3 | Status: SHIPPED | OUTPATIENT
Start: 2019-12-03 | End: 2020-03-17 | Stop reason: SDUPTHER

## 2019-12-03 RX ORDER — POTASSIUM CHLORIDE 750 MG/1
10 TABLET, FILM COATED, EXTENDED RELEASE ORAL DAILY
Qty: 39 TABLET | Refills: 3 | Status: SHIPPED | OUTPATIENT
Start: 2019-12-03 | End: 2020-03-17 | Stop reason: SDUPTHER

## 2019-12-03 RX ORDER — CLOPIDOGREL BISULFATE 75 MG/1
75 TABLET ORAL DAILY
Qty: 30 TABLET | Refills: 5 | Status: SHIPPED | OUTPATIENT
Start: 2019-12-03 | End: 2020-03-17 | Stop reason: SDUPTHER

## 2019-12-03 RX ORDER — CETIRIZINE HYDROCHLORIDE 10 MG/1
10 TABLET ORAL DAILY
Qty: 30 TABLET | Refills: 5 | Status: SHIPPED | OUTPATIENT
Start: 2019-12-03 | End: 2020-03-17 | Stop reason: SDUPTHER

## 2019-12-03 RX ORDER — OMEPRAZOLE 20 MG/1
20 CAPSULE, DELAYED RELEASE ORAL DAILY
Qty: 30 CAPSULE | Refills: 3 | Status: SHIPPED | OUTPATIENT
Start: 2019-12-03 | End: 2020-03-17 | Stop reason: SDUPTHER

## 2019-12-03 NOTE — PROGRESS NOTES
Subjective   Modesta Kaur is a 54 y.o. female.     Here today for zhanna on her htn and her dm.  She reports b/s of 83.  Last a1c was under 6.  She cont to have right sided hemiparesis due to cva.  She wants to start receiving some physical therapy again.    Cerebrovascular Accident   This is a chronic problem. The current episode started more than 1 year ago. The problem occurs constantly. The problem has been unchanged. Associated symptoms include weakness (right sided). Pertinent negatives include no abdominal pain, anorexia, arthralgias, change in bowel habit, chest pain, chills, congestion, coughing, diaphoresis, fatigue, fever, headaches, joint swelling, myalgias, nausea, neck pain, numbness, rash, sore throat, swollen glands, urinary symptoms, vertigo, visual change or vomiting. The symptoms are aggravated by walking and exertion. Treatments tried: has had pt. The treatment provided moderate relief.   Hypertension   This is a chronic problem. The current episode started more than 1 year ago. The problem is unchanged. The problem is controlled. Pertinent negatives include no anxiety, blurred vision, chest pain, headaches, malaise/fatigue, neck pain, orthopnea, palpitations, peripheral edema, PND, shortness of breath or sweats. There are no associated agents to hypertension. Risk factors for coronary artery disease include diabetes mellitus, dyslipidemia, post-menopausal state, sedentary lifestyle and obesity. Past treatments include angiotensin blockers. Current antihypertension treatment includes angiotensin blockers. The current treatment provides significant improvement. There are no compliance problems.  Hypertensive end-organ damage includes CVA. There is no history of angina, kidney disease, CAD/MI, heart failure, left ventricular hypertrophy, PVD or retinopathy.   Diabetes   She presents for her follow-up diabetic visit. She has type 2 diabetes mellitus. Her disease course has been stable. There are no  hypoglycemic associated symptoms. Pertinent negatives for hypoglycemia include no headaches or sweats. Associated symptoms include weakness (right sided). Pertinent negatives for diabetes include no blurred vision, no chest pain, no fatigue, no foot paresthesias, no foot ulcerations, no polydipsia, no polyphagia, no polyuria, no visual change and no weight loss. There are no hypoglycemic complications. Symptoms are stable. Diabetic complications include a CVA. Pertinent negatives for diabetic complications include no autonomic neuropathy, heart disease, impotence, nephropathy, peripheral neuropathy, PVD or retinopathy. Risk factors for coronary artery disease include diabetes mellitus, dyslipidemia, hypertension, obesity, post-menopausal and sedentary lifestyle. Current diabetic treatment includes oral agent (monotherapy). She is compliant with treatment all of the time. Her weight is stable. She is following a diabetic diet. Meal planning includes avoidance of concentrated sweets. She has had a previous visit with a dietitian. She rarely participates in exercise. She monitors blood glucose at home 1-2 x per day. Her breakfast blood glucose is taken between 6-7 am. Her breakfast blood glucose range is generally  mg/dl. Her overall blood glucose range is  mg/dl. An ACE inhibitor/angiotensin II receptor blocker is being taken. She does not see a podiatrist.Eye exam is current.        The following portions of the patient's history were reviewed and updated as appropriate: allergies, current medications, past family history, past medical history, past social history, past surgical history and problem list.    Review of Systems   Constitutional: Negative for chills, diaphoresis, fatigue, fever, malaise/fatigue and unexpected weight loss.   HENT: Negative.  Negative for congestion, sore throat and swollen glands.    Eyes: Negative.  Negative for blurred vision.   Respiratory: Negative.  Negative for cough and  shortness of breath.    Cardiovascular: Negative.  Negative for chest pain, palpitations, orthopnea and PND.   Gastrointestinal: Negative.  Negative for abdominal pain, anorexia, change in bowel habit, nausea and vomiting.   Endocrine: Negative.  Negative for polydipsia, polyphagia and polyuria.   Genitourinary: Negative.  Negative for impotence.   Musculoskeletal: Negative.  Negative for arthralgias, joint swelling, myalgias and neck pain.   Skin: Negative.  Negative for rash.   Allergic/Immunologic: Negative.    Neurological: Positive for weakness (right sided). Negative for vertigo and numbness.   Hematological: Negative.    Psychiatric/Behavioral: Negative.        Objective   Physical Exam   Constitutional: She is oriented to person, place, and time. She appears well-developed and well-nourished. No distress.   HENT:   Head: Normocephalic and atraumatic.   Mouth/Throat: No oropharyngeal exudate.   Eyes: Pupils are equal, round, and reactive to light.   Neck: Normal range of motion. Neck supple. No thyromegaly present.   Cardiovascular: Normal rate, regular rhythm and normal heart sounds. Exam reveals no friction rub.   No murmur heard.  Pulmonary/Chest: Effort normal and breath sounds normal. No respiratory distress. She has no wheezes. She has no rales.   Abdominal: Soft.   Musculoskeletal: Normal range of motion.   Cont to have right sided weakness due to cva.  Can ambulate with great difficulty with a cane.   Neurological: She is alert and oriented to person, place, and time.   Skin: Skin is warm and dry.   Psychiatric: She has a normal mood and affect. Thought content normal.   Nursing note and vitals reviewed.        Assessment/Plan   Modesta was seen today for cerebrovascular accident, hypertension and diabetes.    Diagnoses and all orders for this visit:    Type 2 diabetes mellitus without complication, without long-term current use of insulin (CMS/[facility])  -     Hemoglobin A1c  -     Comprehensive  metabolic panel  -     MicroAlbumin, Urine, Random - Urine, Clean Catch  -     metFORMIN (GLUCOPHAGE) 500 MG tablet; Take 1 tablet by mouth 2 (Two) Times a Day With Meals.  -     Lancets 30G misc; 1 each Daily.  -     glucose blood test strip; Check blood sugar once daily for hyperglycemia    Essential hypertension  -     Comprehensive metabolic panel  -     Cancel: Lipid Panel  -     spironolactone (ALDACTONE) 25 MG tablet; Take 1 tablet by mouth Daily.  -     losartan (COZAAR) 100 MG tablet; Take 1 tablet by mouth Daily.  -     amLODIPine (NORVASC) 10 MG tablet; Take 1 tablet by mouth Daily.    Hyperlipidemia, unspecified hyperlipidemia type  -     Lipid Panel  -     atorvastatin (LIPITOR) 40 MG tablet; Take 1 tablet by mouth Daily.    Cerebrovascular accident (CVA), unspecified mechanism (CMS/HCC)  -     clopidogrel (PLAVIX) 75 MG tablet; Take 1 tablet by mouth Daily.    Seasonal allergic rhinitis due to pollen  -     cetirizine (zyrTEC) 10 MG tablet; Take 1 tablet by mouth Daily.    Gastroesophageal reflux disease, esophagitis presence not specified  -     omeprazole (priLOSEC) 20 MG capsule; Take 1 capsule by mouth Daily.    Obesity (BMI 30.0-34.9)  Comments:  diet and exercise info given    Other orders  -     vitamin D (ERGOCALCIFEROL) 1.25 MG (25257 UT) capsule capsule; Take 1 capsule by mouth Every 7 (Seven) Days.  -     tiZANidine (ZANAFLEX) 4 MG tablet; Take 1 tablet by mouth 3 (Three) Times a Day.  -     potassium chloride (K-DUR) 10 MEQ CR tablet; Take 1 tablet by mouth Daily.  -     Ferrous Sulfate (IRON) 325 (65 Fe) MG tablet; Take 1 tablet by mouth 2 (Two) Times a Day.

## 2019-12-03 NOTE — PATIENT INSTRUCTIONS
Calorie Counting for Weight Loss  Calories are units of energy. Your body needs a certain amount of calories from food to keep you going throughout the day. When you eat more calories than your body needs, your body stores the extra calories as fat. When you eat fewer calories than your body needs, your body burns fat to get the energy it needs.  Calorie counting means keeping track of how many calories you eat and drink each day. Calorie counting can be helpful if you need to lose weight. If you make sure to eat fewer calories than your body needs, you should lose weight. Ask your health care provider what a healthy weight is for you.  For calorie counting to work, you will need to eat the right number of calories in a day in order to lose a healthy amount of weight per week. A dietitian can help you determine how many calories you need in a day and will give you suggestions on how to reach your calorie goal.  · A healthy amount of weight to lose per week is usually 1-2 lb (0.5-0.9 kg). This usually means that your daily calorie intake should be reduced by 500-750 calories.  · Eating 1,200 - 1,500 calories per day can help most women lose weight.  · Eating 1,500 - 1,800 calories per day can help most men lose weight.  What is my plan?  My goal is to have __________ calories per day.  If I have this many calories per day, I should lose around __________ pounds per week.  What do I need to know about calorie counting?  In order to meet your daily calorie goal, you will need to:  · Find out how many calories are in each food you would like to eat. Try to do this before you eat.  · Decide how much of the food you plan to eat.  · Write down what you ate and how many calories it had. Doing this is called keeping a food log.  To successfully lose weight, it is important to balance calorie counting with a healthy lifestyle that includes regular activity. Aim for 150 minutes of moderate exercise (such as walking) or 75  minutes of vigorous exercise (such as running) each week.  Where do I find calorie information?    The number of calories in a food can be found on a Nutrition Facts label. If a food does not have a Nutrition Facts label, try to look up the calories online or ask your dietitian for help.  Remember that calories are listed per serving. If you choose to have more than one serving of a food, you will have to multiply the calories per serving by the amount of servings you plan to eat. For example, the label on a package of bread might say that a serving size is 1 slice and that there are 90 calories in a serving. If you eat 1 slice, you will have eaten 90 calories. If you eat 2 slices, you will have eaten 180 calories.  How do I keep a food log?  Immediately after each meal, record the following information in your food log:  · What you ate. Don't forget to include toppings, sauces, and other extras on the food.  · How much you ate. This can be measured in cups, ounces, or number of items.  · How many calories each food and drink had.  · The total number of calories in the meal.  Keep your food log near you, such as in a small notebook in your pocket, or use a mobile yolanda or website. Some programs will calculate calories for you and show you how many calories you have left for the day to meet your goal.  What are some calorie counting tips?    · Use your calories on foods and drinks that will fill you up and not leave you hungry:  ? Some examples of foods that fill you up are nuts and nut butters, vegetables, lean proteins, and high-fiber foods like whole grains. High-fiber foods are foods with more than 5 g fiber per serving.  ? Drinks such as sodas, specialty coffee drinks, alcohol, and juices have a lot of calories, yet do not fill you up.  · Eat nutritious foods and avoid empty calories. Empty calories are calories you get from foods or beverages that do not have many vitamins or protein, such as candy, sweets, and  "soda. It is better to have a nutritious high-calorie food (such as an avocado) than a food with few nutrients (such as a bag of chips).  · Know how many calories are in the foods you eat most often. This will help you calculate calorie counts faster.  · Pay attention to calories in drinks. Low-calorie drinks include water and unsweetened drinks.  · Pay attention to nutrition labels for \"low fat\" or \"fat free\" foods. These foods sometimes have the same amount of calories or more calories than the full fat versions. They also often have added sugar, starch, or salt, to make up for flavor that was removed with the fat.  · Find a way of tracking calories that works for you. Get creative. Try different apps or programs if writing down calories does not work for you.  What are some portion control tips?  · Know how many calories are in a serving. This will help you know how many servings of a certain food you can have.  · Use a measuring cup to measure serving sizes. You could also try weighing out portions on a kitchen scale. With time, you will be able to estimate serving sizes for some foods.  · Take some time to put servings of different foods on your favorite plates, bowls, and cups so you know what a serving looks like.  · Try not to eat straight from a bag or box. Doing this can lead to overeating. Put the amount you would like to eat in a cup or on a plate to make sure you are eating the right portion.  · Use smaller plates, glasses, and bowls to prevent overeating.  · Try not to multitask (for example, watch TV or use your computer) while eating. If it is time to eat, sit down at a table and enjoy your food. This will help you to know when you are full. It will also help you to be aware of what you are eating and how much you are eating.  What are tips for following this plan?  Reading food labels  · Check the calorie count compared to the serving size. The serving size may be smaller than what you are used to " "eating.  · Check the source of the calories. Make sure the food you are eating is high in vitamins and protein and low in saturated and trans fats.  Shopping  · Read nutrition labels while you shop. This will help you make healthy decisions before you decide to purchase your food.  · Make a grocery list and stick to it.  Cooking  · Try to cook your favorite foods in a healthier way. For example, try baking instead of frying.  · Use low-fat dairy products.  Meal planning  · Use more fruits and vegetables. Half of your plate should be fruits and vegetables.  · Include lean proteins like poultry and fish.  How do I count calories when eating out?  · Ask for smaller portion sizes.  · Consider sharing an entree and sides instead of getting your own entree.  · If you get your own entree, eat only half. Ask for a box at the beginning of your meal and put the rest of your entree in it so you are not tempted to eat it.  · If calories are listed on the menu, choose the lower calorie options.  · Choose dishes that include vegetables, fruits, whole grains, low-fat dairy products, and lean protein.  · Choose items that are boiled, broiled, grilled, or steamed. Stay away from items that are buttered, battered, fried, or served with cream sauce. Items labeled \"crispy\" are usually fried, unless stated otherwise.  · Choose water, low-fat milk, unsweetened iced tea, or other drinks without added sugar. If you want an alcoholic beverage, choose a lower calorie option such as a glass of wine or light beer.  · Ask for dressings, sauces, and syrups on the side. These are usually high in calories, so you should limit the amount you eat.  · If you want a salad, choose a garden salad and ask for grilled meats. Avoid extra toppings like nunez, cheese, or fried items. Ask for the dressing on the side, or ask for olive oil and vinegar or lemon to use as dressing.  · Estimate how many servings of a food you are given. For example, a serving of " cooked rice is ½ cup or about the size of half a baseball. Knowing serving sizes will help you be aware of how much food you are eating at restaurants. The list below tells you how big or small some common portion sizes are based on everyday objects:  ? 1 oz--4 stacked dice.  ? 3 oz--1 deck of cards.  ? 1 tsp--1 die.  ? 1 Tbsp--½ a ping-pong ball.  ? 2 Tbsp--1 ping-pong ball.  ? ½ cup--½ baseball.  ? 1 cup--1 baseball.  Summary  · Calorie counting means keeping track of how many calories you eat and drink each day. If you eat fewer calories than your body needs, you should lose weight.  · A healthy amount of weight to lose per week is usually 1-2 lb (0.5-0.9 kg). This usually means reducing your daily calorie intake by 500-750 calories.  · The number of calories in a food can be found on a Nutrition Facts label. If a food does not have a Nutrition Facts label, try to look up the calories online or ask your dietitian for help.  · Use your calories on foods and drinks that will fill you up, and not on foods and drinks that will leave you hungry.  · Use smaller plates, glasses, and bowls to prevent overeating.  This information is not intended to replace advice given to you by your health care provider. Make sure you discuss any questions you have with your health care provider.  Document Released: 12/18/2006 Document Revised: 09/06/2019 Document Reviewed: 11/17/2017  [employer] Patient Education © 2019 [employer]      Exercising to Lose Weight  Exercise is structured, repetitive physical activity to improve fitness and health. Getting regular exercise is important for everyone. It is especially important if you are overweight. Being overweight increases your risk of heart disease, stroke, diabetes, high blood pressure, and several types of cancer. Reducing your calorie intake and exercising can help you lose weight.  Exercise is usually categorized as moderate or vigorous intensity. To lose weight, most  people need to do a certain amount of moderate-intensity or vigorous-intensity exercise each week.  Moderate-intensity exercise    Moderate-intensity exercise is any activity that gets you moving enough to burn at least three times more energy (calories) than if you were sitting.  Examples of moderate exercise include:  · Walking a mile in 15 minutes.  · Doing light yard work.  · Biking at an easy pace.  Most people should get at least 150 minutes (2 hours and 30 minutes) a week of moderate-intensity exercise to maintain their body weight.  Vigorous-intensity exercise  Vigorous-intensity exercise is any activity that gets you moving enough to burn at least six times more calories than if you were sitting. When you exercise at this intensity, you should be working hard enough that you are not able to carry on a conversation.  Examples of vigorous exercise include:  · Running.  · Playing a team sport, such as football, basketball, and soccer.  · Jumping rope.  Most people should get at least 75 minutes (1 hour and 15 minutes) a week of vigorous-intensity exercise to maintain their body weight.  How can exercise affect me?  When you exercise enough to burn more calories than you eat, you lose weight. Exercise also reduces body fat and builds muscle. The more muscle you have, the more calories you burn. Exercise also:  · Improves mood.  · Reduces stress and tension.  · Improves your overall fitness, flexibility, and endurance.  · Increases bone strength.  The amount of exercise you need to lose weight depends on:  · Your age.  · The type of exercise.  · Any health conditions you have.  · Your overall physical ability.  Talk to your health care provider about how much exercise you need and what types of activities are safe for you.  What actions can I take to lose weight?  Nutrition    · Make changes to your diet as told by your health care provider or diet and nutrition specialist (dietitian). This may  include:  ? Eating fewer calories.  ? Eating more protein.  ? Eating less unhealthy fats.  ? Eating a diet that includes fresh fruits and vegetables, whole grains, low-fat dairy products, and lean protein.  ? Avoiding foods with added fat, salt, and sugar.  · Drink plenty of water while you exercise to prevent dehydration or heat stroke.  Activity  · Choose an activity that you enjoy and set realistic goals. Your health care provider can help you make an exercise plan that works for you.  · Exercise at a moderate or vigorous intensity most days of the week.  ? The intensity of exercise may vary from person to person. You can tell how intense a workout is for you by paying attention to your breathing and heartbeat. Most people will notice their breathing and heartbeat get faster with more intense exercise.  · Do resistance training twice each week, such as:  ? Push-ups.  ? Sit-ups.  ? Lifting weights.  ? Using resistance bands.  · Getting short amounts of exercise can be just as helpful as long structured periods of exercise. If you have trouble finding time to exercise, try to include exercise in your daily routine.  ? Get up, stretch, and walk around every 30 minutes throughout the day.  ? Go for a walk during your lunch break.  ? Park your car farther away from your destination.  ? If you take public transportation, get off one stop early and walk the rest of the way.  ? Make phone calls while standing up and walking around.  ? Take the stairs instead of elevators or escalators.  · Wear comfortable clothes and shoes with good support.  · Do not exercise so much that you hurt yourself, feel dizzy, or get very short of breath.  Where to find more information  · [place] Department of Health and Human Services: www.hhs.gov  · Centers for Disease Control and Prevention (CDC): www.cdc.gov  Contact a health care provider:  · Before starting a new exercise program.  · If you have questions or concerns about your  weight.  · If you have a medical problem that keeps you from exercising.  Get help right away if you have any of the following while exercising:  · Injury.  · Dizziness.  · Difficulty breathing or shortness of breath that does not go away when you stop exercising.  · Chest pain.  · Rapid heartbeat.  Summary  · Being overweight increases your risk of heart disease, stroke, diabetes, high blood pressure, and several types of cancer.  · Losing weight happens when you burn more calories than you eat.  · Reducing the amount of calories you eat in addition to getting regular moderate or vigorous exercise each week helps you lose weight.  This information is not intended to replace advice given to you by your health care provider. Make sure you discuss any questions you have with your health care provider.  Document Released: 01/20/2012 Document Revised: 12/31/2018 Document Reviewed: 12/31/2018  [employer] Patient Education © 2019 [employer]

## 2019-12-27 RX ORDER — ASPIRIN 81 MG/1
TABLET ORAL
Qty: 30 TABLET | Refills: 5 | Status: SHIPPED | OUTPATIENT
Start: 2019-12-27 | End: 2020-03-17 | Stop reason: SDUPTHER

## 2020-01-24 ENCOUNTER — TELEPHONE (OUTPATIENT)
Dept: FAMILY MEDICINE CLINIC | Facility: CLINIC | Age: 55
End: 2020-01-24

## 2020-01-24 DIAGNOSIS — R53.1 WEAKNESS FOLLOWING CEREBROVASCULAR ACCIDENT (CVA): ICD-10-CM

## 2020-01-24 DIAGNOSIS — I69.398 WEAKNESS FOLLOWING CEREBROVASCULAR ACCIDENT (CVA): ICD-10-CM

## 2020-01-24 DIAGNOSIS — G81.91 RIGHT HEMIPARESIS (HCC): ICD-10-CM

## 2020-01-24 DIAGNOSIS — I63.9 CEREBROVASCULAR ACCIDENT (CVA), UNSPECIFIED MECHANISM (HCC): Primary | ICD-10-CM

## 2020-03-03 ENCOUNTER — OFFICE VISIT (OUTPATIENT)
Dept: FAMILY MEDICINE CLINIC | Facility: CLINIC | Age: 55
End: 2020-03-03

## 2020-03-03 ENCOUNTER — TELEPHONE (OUTPATIENT)
Dept: FAMILY MEDICINE CLINIC | Facility: CLINIC | Age: 55
End: 2020-03-03

## 2020-03-03 VITALS
HEART RATE: 104 BPM | HEIGHT: 60 IN | BODY MASS INDEX: 33.18 KG/M2 | OXYGEN SATURATION: 98 % | DIASTOLIC BLOOD PRESSURE: 82 MMHG | SYSTOLIC BLOOD PRESSURE: 148 MMHG | RESPIRATION RATE: 20 BRPM | WEIGHT: 169 LBS | TEMPERATURE: 98.8 F

## 2020-03-03 DIAGNOSIS — E78.5 HYPERLIPIDEMIA, UNSPECIFIED HYPERLIPIDEMIA TYPE: ICD-10-CM

## 2020-03-03 DIAGNOSIS — E66.9 OBESITY (BMI 30.0-34.9): ICD-10-CM

## 2020-03-03 DIAGNOSIS — J30.1 SEASONAL ALLERGIC RHINITIS DUE TO POLLEN: ICD-10-CM

## 2020-03-03 DIAGNOSIS — I10 ESSENTIAL HYPERTENSION: ICD-10-CM

## 2020-03-03 DIAGNOSIS — K21.9 GASTROESOPHAGEAL REFLUX DISEASE, ESOPHAGITIS PRESENCE NOT SPECIFIED: ICD-10-CM

## 2020-03-03 DIAGNOSIS — I63.9 CEREBROVASCULAR ACCIDENT (CVA), UNSPECIFIED MECHANISM (HCC): Primary | ICD-10-CM

## 2020-03-03 DIAGNOSIS — E11.9 TYPE 2 DIABETES MELLITUS WITHOUT COMPLICATION, WITHOUT LONG-TERM CURRENT USE OF INSULIN (HCC): ICD-10-CM

## 2020-03-03 PROCEDURE — 99214 OFFICE O/P EST MOD 30 MIN: CPT | Performed by: NURSE PRACTITIONER

## 2020-03-03 RX ORDER — OMEPRAZOLE 20 MG/1
20 CAPSULE, DELAYED RELEASE ORAL DAILY
Qty: 30 CAPSULE | Refills: 3 | Status: SHIPPED | OUTPATIENT
Start: 2020-03-03 | End: 2020-09-22

## 2020-03-03 RX ORDER — ERGOCALCIFEROL 1.25 MG/1
50000 CAPSULE ORAL
Qty: 4 CAPSULE | Refills: 3 | Status: SHIPPED | OUTPATIENT
Start: 2020-03-03 | End: 2020-06-25

## 2020-03-03 RX ORDER — PNV NO.95/FERROUS FUM/FOLIC AC 28MG-0.8MG
1 TABLET ORAL 2 TIMES DAILY
Qty: 60 TABLET | Refills: 3 | Status: SHIPPED | OUTPATIENT
Start: 2020-03-03 | End: 2020-08-24

## 2020-03-03 RX ORDER — CLOPIDOGREL BISULFATE 75 MG/1
75 TABLET ORAL DAILY
Qty: 30 TABLET | Refills: 5 | Status: SHIPPED | OUTPATIENT
Start: 2020-03-03 | End: 2020-08-24

## 2020-03-03 RX ORDER — LOSARTAN POTASSIUM 100 MG/1
100 TABLET ORAL DAILY
Qty: 30 TABLET | Refills: 5 | Status: SHIPPED | OUTPATIENT
Start: 2020-03-03 | End: 2020-08-24

## 2020-03-03 RX ORDER — AMLODIPINE BESYLATE 10 MG/1
10 TABLET ORAL DAILY
Qty: 30 TABLET | Refills: 5 | Status: SHIPPED | OUTPATIENT
Start: 2020-03-03 | End: 2020-10-22

## 2020-03-03 RX ORDER — ATORVASTATIN CALCIUM 40 MG/1
40 TABLET, FILM COATED ORAL DAILY
Qty: 30 TABLET | Refills: 5 | Status: SHIPPED | OUTPATIENT
Start: 2020-03-03 | End: 2020-09-22

## 2020-03-03 RX ORDER — POTASSIUM CHLORIDE 750 MG/1
10 TABLET, FILM COATED, EXTENDED RELEASE ORAL DAILY
Qty: 39 TABLET | Refills: 3 | Status: SHIPPED | OUTPATIENT
Start: 2020-03-03 | End: 2020-09-24 | Stop reason: SDUPTHER

## 2020-03-03 RX ORDER — CETIRIZINE HYDROCHLORIDE 10 MG/1
10 TABLET ORAL DAILY
Qty: 30 TABLET | Refills: 5 | Status: SHIPPED | OUTPATIENT
Start: 2020-03-03 | End: 2021-06-19

## 2020-03-03 RX ORDER — SPIRONOLACTONE 25 MG/1
25 TABLET ORAL DAILY
Qty: 30 TABLET | Refills: 5 | Status: SHIPPED | OUTPATIENT
Start: 2020-03-03 | End: 2021-06-19

## 2020-03-03 RX ORDER — TIZANIDINE 4 MG/1
4 TABLET ORAL 3 TIMES DAILY
Qty: 90 TABLET | Refills: 3 | Status: SHIPPED | OUTPATIENT
Start: 2020-03-03 | End: 2020-08-24

## 2020-03-03 RX ORDER — ASPIRIN 81 MG/1
81 TABLET ORAL DAILY
Qty: 30 TABLET | Refills: 5 | Status: SHIPPED | OUTPATIENT
Start: 2020-03-03 | End: 2020-06-25

## 2020-03-03 NOTE — PROGRESS NOTES
Subjective   Modesta Kaur is a 54 y.o. female.     Here today for zhanna.  She has a hx of cva with right sided weakness.  She needs refills.  She is not checking her b/s or her b/p at home.    Diabetes   She presents for her follow-up diabetic visit. She has type 2 diabetes mellitus. Her disease course has been stable. There are no hypoglycemic associated symptoms. Pertinent negatives for hypoglycemia include no headaches or sweats. Associated symptoms include weakness (right sided wealness). Pertinent negatives for diabetes include no blurred vision, no chest pain, no fatigue and no visual change. There are no hypoglycemic complications. Symptoms are stable. Diabetic complications include a CVA. Pertinent negatives for diabetic complications include no autonomic neuropathy, heart disease, impotence, nephropathy, peripheral neuropathy, PVD or retinopathy. Risk factors for coronary artery disease include diabetes mellitus, dyslipidemia, hypertension, post-menopausal and sedentary lifestyle. Current diabetic treatment includes oral agent (monotherapy). She is compliant with treatment all of the time. Her weight is stable. She is following a diabetic diet. Meal planning includes avoidance of concentrated sweets. She has not had a previous visit with a dietitian. She rarely participates in exercise. There is no compliance with monitoring of blood glucose. An ACE inhibitor/angiotensin II receptor blocker is being taken. She does not see a podiatrist.Eye exam is current.   Hypertension   The current episode started more than 1 year ago. The problem is unchanged. The problem is controlled. Pertinent negatives include no anxiety, blurred vision, chest pain, headaches, malaise/fatigue, neck pain, orthopnea, palpitations, peripheral edema, PND, shortness of breath or sweats. There are no associated agents to hypertension. Risk factors for coronary artery disease include diabetes mellitus, dyslipidemia, obesity,  post-menopausal state and sedentary lifestyle. Past treatments include angiotensin blockers and calcium channel blockers. Current antihypertension treatment includes angiotensin blockers and calcium channel blockers. The current treatment provides significant improvement. There are no compliance problems.  Hypertensive end-organ damage includes CVA. There is no history of angina, kidney disease, CAD/MI, heart failure, left ventricular hypertrophy, PVD or retinopathy.   Cerebrovascular Accident   This is a chronic problem. The current episode started more than 1 year ago. The problem occurs constantly. The problem has been waxing and waning. Associated symptoms include numbness and weakness (right sided wealness). Pertinent negatives include no abdominal pain, anorexia, arthralgias, change in bowel habit, chest pain, chills, congestion, coughing, diaphoresis, fatigue, fever, headaches, joint swelling, myalgias, nausea, neck pain, rash, sore throat, swollen glands, urinary symptoms, vertigo, visual change or vomiting. The symptoms are aggravated by walking and exertion. Treatments tried: plavix. The treatment provided moderate relief.        The following portions of the patient's history were reviewed and updated as appropriate: allergies, current medications, past family history, past medical history, past social history, past surgical history and problem list.    Review of Systems   Constitutional: Negative for chills, diaphoresis, fatigue, fever and malaise/fatigue.   HENT: Negative.  Negative for congestion, sore throat and swollen glands.    Eyes: Negative.  Negative for blurred vision.   Respiratory: Negative.  Negative for cough and shortness of breath.    Cardiovascular: Negative.  Negative for chest pain, palpitations, orthopnea and PND.   Gastrointestinal: Negative.  Negative for abdominal pain, anorexia, change in bowel habit, nausea and vomiting.   Endocrine: Negative.    Genitourinary: Negative.   Negative for impotence.   Musculoskeletal: Negative.  Negative for arthralgias, joint swelling, myalgias and neck pain.   Skin: Negative.  Negative for rash.   Allergic/Immunologic: Negative.    Neurological: Positive for weakness (right sided wealness) and numbness. Negative for vertigo.   Hematological: Negative.    Psychiatric/Behavioral: Negative.        Objective   Physical Exam   Constitutional: She is oriented to person, place, and time. She appears well-developed and well-nourished. No distress.   HENT:   Head: Normocephalic and atraumatic.   Mouth/Throat: No oropharyngeal exudate.   Eyes: Pupils are equal, round, and reactive to light.   Neck: Normal range of motion. Neck supple. No thyromegaly present.   Cardiovascular: Normal rate, regular rhythm and normal heart sounds. Exam reveals no friction rub.   No murmur heard.  Pulmonary/Chest: Effort normal and breath sounds normal. No respiratory distress. She has no wheezes. She has no rales.   Abdominal: Soft.   Musculoskeletal: Normal range of motion.   Neurological: She is alert and oriented to person, place, and time.   Skin: Skin is warm and dry.   Psychiatric: She has a normal mood and affect. Thought content normal.   Nursing note and vitals reviewed.        Assessment/Plan   Modesta was seen today for diabetes, hypertension and cerebrovascular accident.    Diagnoses and all orders for this visit:    Cerebrovascular accident (CVA), unspecified mechanism (CMS/[facility])  -     clopidogrel (PLAVIX) 75 MG tablet; Take 1 tablet by mouth Daily.    Type 2 diabetes mellitus without complication, without long-term current use of insulin (CMS/[facility])  -     metFORMIN (GLUCOPHAGE) 500 MG tablet; Take 1 tablet by mouth 2 (Two) Times a Day With Meals.  -     glucose blood test strip; Check blood sugar once daily for hyperglycemia  -     Lancets 30G misc; 1 each Daily.    Hyperlipidemia, unspecified hyperlipidemia type  -     atorvastatin (LIPITOR) 40 MG tablet; Take 1  tablet by mouth Daily.    Essential hypertension  -     spironolactone (ALDACTONE) 25 MG tablet; Take 1 tablet by mouth Daily.  -     losartan (COZAAR) 100 MG tablet; Take 1 tablet by mouth Daily.  -     amLODIPine (NORVASC) 10 MG tablet; Take 1 tablet by mouth Daily.    Gastroesophageal reflux disease, esophagitis presence not specified  -     omeprazole (priLOSEC) 20 MG capsule; Take 1 capsule by mouth Daily.    Seasonal allergic rhinitis due to pollen  -     cetirizine (zyrTEC) 10 MG tablet; Take 1 tablet by mouth Daily.    Obesity (BMI 30.0-34.9)  Comments:  diet and exercise info given    Other orders  -     vitamin D (ERGOCALCIFEROL) 1.25 MG (49671 UT) capsule capsule; Take 1 capsule by mouth Every 7 (Seven) Days.  -     tiZANidine (ZANAFLEX) 4 MG tablet; Take 1 tablet by mouth 3 (Three) Times a Day.  -     potassium chloride (K-DUR) 10 MEQ CR tablet; Take 1 tablet by mouth Daily.  -     ferrous sulfate 325 (65 Fe) MG tablet; Take 1 tablet by mouth 2 (Two) Times a Day.  -     aspirin 81 MG EC tablet; Take 1 tablet by mouth Daily.

## 2020-03-04 ENCOUNTER — TELEPHONE (OUTPATIENT)
Dept: FAMILY MEDICINE CLINIC | Facility: CLINIC | Age: 55
End: 2020-03-04

## 2020-03-04 NOTE — TELEPHONE ENCOUNTER
[facility] WANTS TO CONFIRM IF PREDNISONE NEEDS TO BE DC DUE TO NO RESPONSE WHEN REQUESTED. [phone]

## 2020-03-06 ENCOUNTER — TELEPHONE (OUTPATIENT)
Dept: FAMILY MEDICINE CLINIC | Facility: CLINIC | Age: 55
End: 2020-03-06

## 2020-03-06 ENCOUNTER — APPOINTMENT (OUTPATIENT)
Dept: LAB | Facility: HOSPITAL | Age: 55
End: 2020-03-06

## 2020-03-06 LAB
ALBUMIN SERPL-MCNC: 4.8 G/DL (ref 3.5–5.2)
ALBUMIN UR-MCNC: 2.2 MG/DL
ALBUMIN/GLOB SERPL: 1.9 G/DL
ALP SERPL-CCNC: 124 U/L (ref 39–117)
ALT SERPL W P-5'-P-CCNC: 23 U/L (ref 1–33)
ANION GAP SERPL CALCULATED.3IONS-SCNC: 15.9 MMOL/L (ref 5–15)
AST SERPL-CCNC: 17 U/L (ref 1–32)
BILIRUB SERPL-MCNC: 0.7 MG/DL (ref 0.2–1.2)
BUN BLD-MCNC: 16 MG/DL (ref 6–20)
BUN/CREAT SERPL: 18.8 (ref 7–25)
CALCIUM SPEC-SCNC: 9.8 MG/DL (ref 8.6–10.5)
CHLORIDE SERPL-SCNC: 101 MMOL/L (ref 98–107)
CHOLEST SERPL-MCNC: 142 MG/DL (ref 0–200)
CO2 SERPL-SCNC: 25.1 MMOL/L (ref 22–29)
CREAT BLD-MCNC: 0.85 MG/DL (ref 0.57–1)
GFR SERPL CREATININE-BSD FRML MDRD: 84 ML/MIN/1.73
GLOBULIN UR ELPH-MCNC: 2.5 GM/DL
GLUCOSE BLD-MCNC: 91 MG/DL (ref 65–99)
HBA1C MFR BLD: 6.8 % (ref 4.8–5.6)
HDLC SERPL-MCNC: 59 MG/DL (ref 40–60)
LDLC SERPL CALC-MCNC: 64 MG/DL (ref 0–100)
LDLC/HDLC SERPL: 1.08 {RATIO}
POTASSIUM BLD-SCNC: 3.8 MMOL/L (ref 3.5–5.2)
PROT SERPL-MCNC: 7.3 G/DL (ref 6–8.5)
SODIUM BLD-SCNC: 142 MMOL/L (ref 136–145)
TRIGL SERPL-MCNC: 97 MG/DL (ref 0–150)
VLDLC SERPL-MCNC: 19.4 MG/DL (ref 5–40)

## 2020-03-06 PROCEDURE — 83036 HEMOGLOBIN GLYCOSYLATED A1C: CPT | Performed by: NURSE PRACTITIONER

## 2020-03-06 PROCEDURE — 80053 COMPREHEN METABOLIC PANEL: CPT | Performed by: NURSE PRACTITIONER

## 2020-03-06 PROCEDURE — 80061 LIPID PANEL: CPT | Performed by: NURSE PRACTITIONER

## 2020-03-06 PROCEDURE — 82043 UR ALBUMIN QUANTITATIVE: CPT | Performed by: NURSE PRACTITIONER

## 2020-03-17 NOTE — PATIENT INSTRUCTIONS
Calorie Counting for Weight Loss  Calories are units of energy. Your body needs a certain amount of calories from food to keep you going throughout the day. When you eat more calories than your body needs, your body stores the extra calories as fat. When you eat fewer calories than your body needs, your body burns fat to get the energy it needs.  Calorie counting means keeping track of how many calories you eat and drink each day. Calorie counting can be helpful if you need to lose weight. If you make sure to eat fewer calories than your body needs, you should lose weight. Ask your health care provider what a healthy weight is for you.  For calorie counting to work, you will need to eat the right number of calories in a day in order to lose a healthy amount of weight per week. A dietitian can help you determine how many calories you need in a day and will give you suggestions on how to reach your calorie goal.  · A healthy amount of weight to lose per week is usually 1-2 lb (0.5-0.9 kg). This usually means that your daily calorie intake should be reduced by 500-750 calories.  · Eating 1,200 - 1,500 calories per day can help most women lose weight.  · Eating 1,500 - 1,800 calories per day can help most men lose weight.  What is my plan?  My goal is to have __________ calories per day.  If I have this many calories per day, I should lose around __________ pounds per week.  What do I need to know about calorie counting?  In order to meet your daily calorie goal, you will need to:  · Find out how many calories are in each food you would like to eat. Try to do this before you eat.  · Decide how much of the food you plan to eat.  · Write down what you ate and how many calories it had. Doing this is called keeping a food log.  To successfully lose weight, it is important to balance calorie counting with a healthy lifestyle that includes regular activity. Aim for 150 minutes of moderate exercise (such as walking) or 75  minutes of vigorous exercise (such as running) each week.  Where do I find calorie information?    The number of calories in a food can be found on a Nutrition Facts label. If a food does not have a Nutrition Facts label, try to look up the calories online or ask your dietitian for help.  Remember that calories are listed per serving. If you choose to have more than one serving of a food, you will have to multiply the calories per serving by the amount of servings you plan to eat. For example, the label on a package of bread might say that a serving size is 1 slice and that there are 90 calories in a serving. If you eat 1 slice, you will have eaten 90 calories. If you eat 2 slices, you will have eaten 180 calories.  How do I keep a food log?  Immediately after each meal, record the following information in your food log:  · What you ate. Don't forget to include toppings, sauces, and other extras on the food.  · How much you ate. This can be measured in cups, ounces, or number of items.  · How many calories each food and drink had.  · The total number of calories in the meal.  Keep your food log near you, such as in a small notebook in your pocket, or use a mobile yolanda or website. Some programs will calculate calories for you and show you how many calories you have left for the day to meet your goal.  What are some calorie counting tips?    · Use your calories on foods and drinks that will fill you up and not leave you hungry:  ? Some examples of foods that fill you up are nuts and nut butters, vegetables, lean proteins, and high-fiber foods like whole grains. High-fiber foods are foods with more than 5 g fiber per serving.  ? Drinks such as sodas, specialty coffee drinks, alcohol, and juices have a lot of calories, yet do not fill you up.  · Eat nutritious foods and avoid empty calories. Empty calories are calories you get from foods or beverages that do not have many vitamins or protein, such as candy, sweets, and  "soda. It is better to have a nutritious high-calorie food (such as an avocado) than a food with few nutrients (such as a bag of chips).  · Know how many calories are in the foods you eat most often. This will help you calculate calorie counts faster.  · Pay attention to calories in drinks. Low-calorie drinks include water and unsweetened drinks.  · Pay attention to nutrition labels for \"low fat\" or \"fat free\" foods. These foods sometimes have the same amount of calories or more calories than the full fat versions. They also often have added sugar, starch, or salt, to make up for flavor that was removed with the fat.  · Find a way of tracking calories that works for you. Get creative. Try different apps or programs if writing down calories does not work for you.  What are some portion control tips?  · Know how many calories are in a serving. This will help you know how many servings of a certain food you can have.  · Use a measuring cup to measure serving sizes. You could also try weighing out portions on a kitchen scale. With time, you will be able to estimate serving sizes for some foods.  · Take some time to put servings of different foods on your favorite plates, bowls, and cups so you know what a serving looks like.  · Try not to eat straight from a bag or box. Doing this can lead to overeating. Put the amount you would like to eat in a cup or on a plate to make sure you are eating the right portion.  · Use smaller plates, glasses, and bowls to prevent overeating.  · Try not to multitask (for example, watch TV or use your computer) while eating. If it is time to eat, sit down at a table and enjoy your food. This will help you to know when you are full. It will also help you to be aware of what you are eating and how much you are eating.  What are tips for following this plan?  Reading food labels  · Check the calorie count compared to the serving size. The serving size may be smaller than what you are used to " "eating.  · Check the source of the calories. Make sure the food you are eating is high in vitamins and protein and low in saturated and trans fats.  Shopping  · Read nutrition labels while you shop. This will help you make healthy decisions before you decide to purchase your food.  · Make a grocery list and stick to it.  Cooking  · Try to cook your favorite foods in a healthier way. For example, try baking instead of frying.  · Use low-fat dairy products.  Meal planning  · Use more fruits and vegetables. Half of your plate should be fruits and vegetables.  · Include lean proteins like poultry and fish.  How do I count calories when eating out?  · Ask for smaller portion sizes.  · Consider sharing an entree and sides instead of getting your own entree.  · If you get your own entree, eat only half. Ask for a box at the beginning of your meal and put the rest of your entree in it so you are not tempted to eat it.  · If calories are listed on the menu, choose the lower calorie options.  · Choose dishes that include vegetables, fruits, whole grains, low-fat dairy products, and lean protein.  · Choose items that are boiled, broiled, grilled, or steamed. Stay away from items that are buttered, battered, fried, or served with cream sauce. Items labeled \"crispy\" are usually fried, unless stated otherwise.  · Choose water, low-fat milk, unsweetened iced tea, or other drinks without added sugar. If you want an alcoholic beverage, choose a lower calorie option such as a glass of wine or light beer.  · Ask for dressings, sauces, and syrups on the side. These are usually high in calories, so you should limit the amount you eat.  · If you want a salad, choose a garden salad and ask for grilled meats. Avoid extra toppings like nunez, cheese, or fried items. Ask for the dressing on the side, or ask for olive oil and vinegar or lemon to use as dressing.  · Estimate how many servings of a food you are given. For example, a serving of " cooked rice is ½ cup or about the size of half a baseball. Knowing serving sizes will help you be aware of how much food you are eating at restaurants. The list below tells you how big or small some common portion sizes are based on everyday objects:  ? 1 oz--4 stacked dice.  ? 3 oz--1 deck of cards.  ? 1 tsp--1 die.  ? 1 Tbsp--½ a ping-pong ball.  ? 2 Tbsp--1 ping-pong ball.  ? ½ cup--½ baseball.  ? 1 cup--1 baseball.  Summary  · Calorie counting means keeping track of how many calories you eat and drink each day. If you eat fewer calories than your body needs, you should lose weight.  · A healthy amount of weight to lose per week is usually 1-2 lb (0.5-0.9 kg). This usually means reducing your daily calorie intake by 500-750 calories.  · The number of calories in a food can be found on a Nutrition Facts label. If a food does not have a Nutrition Facts label, try to look up the calories online or ask your dietitian for help.  · Use your calories on foods and drinks that will fill you up, and not on foods and drinks that will leave you hungry.  · Use smaller plates, glasses, and bowls to prevent overeating.  This information is not intended to replace advice given to you by your health care provider. Make sure you discuss any questions you have with your health care provider.  Document Released: 12/18/2006 Document Revised: 09/06/2019 Document Reviewed: 11/17/2017  [employer] Patient Education © 2020 [employer]      Exercising to Lose Weight  Exercise is structured, repetitive physical activity to improve fitness and health. Getting regular exercise is important for everyone. It is especially important if you are overweight. Being overweight increases your risk of heart disease, stroke, diabetes, high blood pressure, and several types of cancer. Reducing your calorie intake and exercising can help you lose weight.  Exercise is usually categorized as moderate or vigorous intensity. To lose weight, most  people need to do a certain amount of moderate-intensity or vigorous-intensity exercise each week.  Moderate-intensity exercise    Moderate-intensity exercise is any activity that gets you moving enough to burn at least three times more energy (calories) than if you were sitting.  Examples of moderate exercise include:  · Walking a mile in 15 minutes.  · Doing light yard work.  · Biking at an easy pace.  Most people should get at least 150 minutes (2 hours and 30 minutes) a week of moderate-intensity exercise to maintain their body weight.  Vigorous-intensity exercise  Vigorous-intensity exercise is any activity that gets you moving enough to burn at least six times more calories than if you were sitting. When you exercise at this intensity, you should be working hard enough that you are not able to carry on a conversation.  Examples of vigorous exercise include:  · Running.  · Playing a team sport, such as football, basketball, and soccer.  · Jumping rope.  Most people should get at least 75 minutes (1 hour and 15 minutes) a week of vigorous-intensity exercise to maintain their body weight.  How can exercise affect me?  When you exercise enough to burn more calories than you eat, you lose weight. Exercise also reduces body fat and builds muscle. The more muscle you have, the more calories you burn. Exercise also:  · Improves mood.  · Reduces stress and tension.  · Improves your overall fitness, flexibility, and endurance.  · Increases bone strength.  The amount of exercise you need to lose weight depends on:  · Your age.  · The type of exercise.  · Any health conditions you have.  · Your overall physical ability.  Talk to your health care provider about how much exercise you need and what types of activities are safe for you.  What actions can I take to lose weight?  Nutrition    · Make changes to your diet as told by your health care provider or diet and nutrition specialist (dietitian). This may  include:  ? Eating fewer calories.  ? Eating more protein.  ? Eating less unhealthy fats.  ? Eating a diet that includes fresh fruits and vegetables, whole grains, low-fat dairy products, and lean protein.  ? Avoiding foods with added fat, salt, and sugar.  · Drink plenty of water while you exercise to prevent dehydration or heat stroke.  Activity  · Choose an activity that you enjoy and set realistic goals. Your health care provider can help you make an exercise plan that works for you.  · Exercise at a moderate or vigorous intensity most days of the week.  ? The intensity of exercise may vary from person to person. You can tell how intense a workout is for you by paying attention to your breathing and heartbeat. Most people will notice their breathing and heartbeat get faster with more intense exercise.  · Do resistance training twice each week, such as:  ? Push-ups.  ? Sit-ups.  ? Lifting weights.  ? Using resistance bands.  · Getting short amounts of exercise can be just as helpful as long structured periods of exercise. If you have trouble finding time to exercise, try to include exercise in your daily routine.  ? Get up, stretch, and walk around every 30 minutes throughout the day.  ? Go for a walk during your lunch break.  ? Park your car farther away from your destination.  ? If you take public transportation, get off one stop early and walk the rest of the way.  ? Make phone calls while standing up and walking around.  ? Take the stairs instead of elevators or escalators.  · Wear comfortable clothes and shoes with good support.  · Do not exercise so much that you hurt yourself, feel dizzy, or get very short of breath.  Where to find more information  · [place] Department of Health and Human Services: www.hhs.gov  · Centers for Disease Control and Prevention (CDC): www.cdc.gov  Contact a health care provider:  · Before starting a new exercise program.  · If you have questions or concerns about your  weight.  · If you have a medical problem that keeps you from exercising.  Get help right away if you have any of the following while exercising:  · Injury.  · Dizziness.  · Difficulty breathing or shortness of breath that does not go away when you stop exercising.  · Chest pain.  · Rapid heartbeat.  Summary  · Being overweight increases your risk of heart disease, stroke, diabetes, high blood pressure, and several types of cancer.  · Losing weight happens when you burn more calories than you eat.  · Reducing the amount of calories you eat in addition to getting regular moderate or vigorous exercise each week helps you lose weight.  This information is not intended to replace advice given to you by your health care provider. Make sure you discuss any questions you have with your health care provider.  Document Released: 01/20/2012 Document Revised: 12/31/2018 Document Reviewed: 12/31/2018  [employer] Patient Education © 2020 [employer]

## 2020-06-25 RX ORDER — ASPIRIN 81 MG/1
TABLET ORAL
Qty: 30 TABLET | Refills: 5 | Status: SHIPPED | OUTPATIENT
Start: 2020-06-25 | End: 2020-12-21

## 2020-06-25 RX ORDER — ERGOCALCIFEROL 1.25 MG/1
CAPSULE ORAL
Qty: 4 CAPSULE | Refills: 3 | Status: SHIPPED | OUTPATIENT
Start: 2020-06-25 | End: 2020-10-22

## 2020-07-13 ENCOUNTER — OFFICE VISIT (OUTPATIENT)
Dept: FAMILY MEDICINE CLINIC | Facility: CLINIC | Age: 55
End: 2020-07-13

## 2020-07-13 VITALS
HEIGHT: 60 IN | BODY MASS INDEX: 34.36 KG/M2 | OXYGEN SATURATION: 96 % | DIASTOLIC BLOOD PRESSURE: 80 MMHG | RESPIRATION RATE: 20 BRPM | SYSTOLIC BLOOD PRESSURE: 136 MMHG | TEMPERATURE: 97.6 F | HEART RATE: 115 BPM | WEIGHT: 175 LBS

## 2020-07-13 DIAGNOSIS — E11.9 TYPE 2 DIABETES MELLITUS WITHOUT COMPLICATION, WITHOUT LONG-TERM CURRENT USE OF INSULIN (HCC): Primary | ICD-10-CM

## 2020-07-13 DIAGNOSIS — I10 ESSENTIAL HYPERTENSION: ICD-10-CM

## 2020-07-13 DIAGNOSIS — I63.9 CEREBROVASCULAR ACCIDENT (CVA), UNSPECIFIED MECHANISM (HCC): ICD-10-CM

## 2020-07-13 PROCEDURE — 99213 OFFICE O/P EST LOW 20 MIN: CPT | Performed by: NURSE PRACTITIONER

## 2020-07-17 NOTE — PATIENT INSTRUCTIONS
Calorie Counting for Weight Loss  Calories are units of energy. Your body needs a certain amount of calories from food to keep you going throughout the day. When you eat more calories than your body needs, your body stores the extra calories as fat. When you eat fewer calories than your body needs, your body burns fat to get the energy it needs.  Calorie counting means keeping track of how many calories you eat and drink each day. Calorie counting can be helpful if you need to lose weight. If you make sure to eat fewer calories than your body needs, you should lose weight. Ask your health care provider what a healthy weight is for you.  For calorie counting to work, you will need to eat the right number of calories in a day in order to lose a healthy amount of weight per week. A dietitian can help you determine how many calories you need in a day and will give you suggestions on how to reach your calorie goal.  · A healthy amount of weight to lose per week is usually 1-2 lb (0.5-0.9 kg). This usually means that your daily calorie intake should be reduced by 500-750 calories.  · Eating 1,200 - 1,500 calories per day can help most women lose weight.  · Eating 1,500 - 1,800 calories per day can help most men lose weight.  What is my plan?  My goal is to have __________ calories per day.  If I have this many calories per day, I should lose around __________ pounds per week.  What do I need to know about calorie counting?  In order to meet your daily calorie goal, you will need to:  · Find out how many calories are in each food you would like to eat. Try to do this before you eat.  · Decide how much of the food you plan to eat.  · Write down what you ate and how many calories it had. Doing this is called keeping a food log.  To successfully lose weight, it is important to balance calorie counting with a healthy lifestyle that includes regular activity. Aim for 150 minutes of moderate exercise (such as walking) or 75  minutes of vigorous exercise (such as running) each week.  Where do I find calorie information?    The number of calories in a food can be found on a Nutrition Facts label. If a food does not have a Nutrition Facts label, try to look up the calories online or ask your dietitian for help.  Remember that calories are listed per serving. If you choose to have more than one serving of a food, you will have to multiply the calories per serving by the amount of servings you plan to eat. For example, the label on a package of bread might say that a serving size is 1 slice and that there are 90 calories in a serving. If you eat 1 slice, you will have eaten 90 calories. If you eat 2 slices, you will have eaten 180 calories.  How do I keep a food log?  Immediately after each meal, record the following information in your food log:  · What you ate. Don't forget to include toppings, sauces, and other extras on the food.  · How much you ate. This can be measured in cups, ounces, or number of items.  · How many calories each food and drink had.  · The total number of calories in the meal.  Keep your food log near you, such as in a small notebook in your pocket, or use a mobile yolanda or website. Some programs will calculate calories for you and show you how many calories you have left for the day to meet your goal.  What are some calorie counting tips?    · Use your calories on foods and drinks that will fill you up and not leave you hungry:  ? Some examples of foods that fill you up are nuts and nut butters, vegetables, lean proteins, and high-fiber foods like whole grains. High-fiber foods are foods with more than 5 g fiber per serving.  ? Drinks such as sodas, specialty coffee drinks, alcohol, and juices have a lot of calories, yet do not fill you up.  · Eat nutritious foods and avoid empty calories. Empty calories are calories you get from foods or beverages that do not have many vitamins or protein, such as candy, sweets, and  "soda. It is better to have a nutritious high-calorie food (such as an avocado) than a food with few nutrients (such as a bag of chips).  · Know how many calories are in the foods you eat most often. This will help you calculate calorie counts faster.  · Pay attention to calories in drinks. Low-calorie drinks include water and unsweetened drinks.  · Pay attention to nutrition labels for \"low fat\" or \"fat free\" foods. These foods sometimes have the same amount of calories or more calories than the full fat versions. They also often have added sugar, starch, or salt, to make up for flavor that was removed with the fat.  · Find a way of tracking calories that works for you. Get creative. Try different apps or programs if writing down calories does not work for you.  What are some portion control tips?  · Know how many calories are in a serving. This will help you know how many servings of a certain food you can have.  · Use a measuring cup to measure serving sizes. You could also try weighing out portions on a kitchen scale. With time, you will be able to estimate serving sizes for some foods.  · Take some time to put servings of different foods on your favorite plates, bowls, and cups so you know what a serving looks like.  · Try not to eat straight from a bag or box. Doing this can lead to overeating. Put the amount you would like to eat in a cup or on a plate to make sure you are eating the right portion.  · Use smaller plates, glasses, and bowls to prevent overeating.  · Try not to multitask (for example, watch TV or use your computer) while eating. If it is time to eat, sit down at a table and enjoy your food. This will help you to know when you are full. It will also help you to be aware of what you are eating and how much you are eating.  What are tips for following this plan?  Reading food labels  · Check the calorie count compared to the serving size. The serving size may be smaller than what you are used to " "eating.  · Check the source of the calories. Make sure the food you are eating is high in vitamins and protein and low in saturated and trans fats.  Shopping  · Read nutrition labels while you shop. This will help you make healthy decisions before you decide to purchase your food.  · Make a grocery list and stick to it.  Cooking  · Try to cook your favorite foods in a healthier way. For example, try baking instead of frying.  · Use low-fat dairy products.  Meal planning  · Use more fruits and vegetables. Half of your plate should be fruits and vegetables.  · Include lean proteins like poultry and fish.  How do I count calories when eating out?  · Ask for smaller portion sizes.  · Consider sharing an entree and sides instead of getting your own entree.  · If you get your own entree, eat only half. Ask for a box at the beginning of your meal and put the rest of your entree in it so you are not tempted to eat it.  · If calories are listed on the menu, choose the lower calorie options.  · Choose dishes that include vegetables, fruits, whole grains, low-fat dairy products, and lean protein.  · Choose items that are boiled, broiled, grilled, or steamed. Stay away from items that are buttered, battered, fried, or served with cream sauce. Items labeled \"crispy\" are usually fried, unless stated otherwise.  · Choose water, low-fat milk, unsweetened iced tea, or other drinks without added sugar. If you want an alcoholic beverage, choose a lower calorie option such as a glass of wine or light beer.  · Ask for dressings, sauces, and syrups on the side. These are usually high in calories, so you should limit the amount you eat.  · If you want a salad, choose a garden salad and ask for grilled meats. Avoid extra toppings like nunez, cheese, or fried items. Ask for the dressing on the side, or ask for olive oil and vinegar or lemon to use as dressing.  · Estimate how many servings of a food you are given. For example, a serving of " cooked rice is ½ cup or about the size of half a baseball. Knowing serving sizes will help you be aware of how much food you are eating at restaurants. The list below tells you how big or small some common portion sizes are based on everyday objects:  ? 1 oz--4 stacked dice.  ? 3 oz--1 deck of cards.  ? 1 tsp--1 die.  ? 1 Tbsp--½ a ping-pong ball.  ? 2 Tbsp--1 ping-pong ball.  ? ½ cup--½ baseball.  ? 1 cup--1 baseball.  Summary  · Calorie counting means keeping track of how many calories you eat and drink each day. If you eat fewer calories than your body needs, you should lose weight.  · A healthy amount of weight to lose per week is usually 1-2 lb (0.5-0.9 kg). This usually means reducing your daily calorie intake by 500-750 calories.  · The number of calories in a food can be found on a Nutrition Facts label. If a food does not have a Nutrition Facts label, try to look up the calories online or ask your dietitian for help.  · Use your calories on foods and drinks that will fill you up, and not on foods and drinks that will leave you hungry.  · Use smaller plates, glasses, and bowls to prevent overeating.  This information is not intended to replace advice given to you by your health care provider. Make sure you discuss any questions you have with your health care provider.  Document Released: 12/18/2006 Document Revised: 09/06/2019 Document Reviewed: 11/17/2017  [employer] Patient Education © 2020 [employer]      Exercising to Lose Weight  Exercise is structured, repetitive physical activity to improve fitness and health. Getting regular exercise is important for everyone. It is especially important if you are overweight. Being overweight increases your risk of heart disease, stroke, diabetes, high blood pressure, and several types of cancer. Reducing your calorie intake and exercising can help you lose weight.  Exercise is usually categorized as moderate or vigorous intensity. To lose weight, most people need  to do a certain amount of moderate-intensity or vigorous-intensity exercise each week.  Moderate-intensity exercise    Moderate-intensity exercise is any activity that gets you moving enough to burn at least three times more energy (calories) than if you were sitting.  Examples of moderate exercise include:  · Walking a mile in 15 minutes.  · Doing light yard work.  · Biking at an easy pace.  Most people should get at least 150 minutes (2 hours and 30 minutes) a week of moderate-intensity exercise to maintain their body weight.  Vigorous-intensity exercise  Vigorous-intensity exercise is any activity that gets you moving enough to burn at least six times more calories than if you were sitting. When you exercise at this intensity, you should be working hard enough that you are not able to carry on a conversation.  Examples of vigorous exercise include:  · Running.  · Playing a team sport, such as football, basketball, and soccer.  · Jumping rope.  Most people should get at least 75 minutes (1 hour and 15 minutes) a week of vigorous-intensity exercise to maintain their body weight.  How can exercise affect me?  When you exercise enough to burn more calories than you eat, you lose weight. Exercise also reduces body fat and builds muscle. The more muscle you have, the more calories you burn. Exercise also:  · Improves mood.  · Reduces stress and tension.  · Improves your overall fitness, flexibility, and endurance.  · Increases bone strength.  The amount of exercise you need to lose weight depends on:  · Your age.  · The type of exercise.  · Any health conditions you have.  · Your overall physical ability.  Talk to your health care provider about how much exercise you need and what types of activities are safe for you.  What actions can I take to lose weight?  Nutrition    · Make changes to your diet as told by your health care provider or diet and nutrition specialist (dietitian). This may include:  ? Eating fewer  calories.  ? Eating more protein.  ? Eating less unhealthy fats.  ? Eating a diet that includes fresh fruits and vegetables, whole grains, low-fat dairy products, and lean protein.  ? Avoiding foods with added fat, salt, and sugar.  · Drink plenty of water while you exercise to prevent dehydration or heat stroke.  Activity  · Choose an activity that you enjoy and set realistic goals. Your health care provider can help you make an exercise plan that works for you.  · Exercise at a moderate or vigorous intensity most days of the week.  ? The intensity of exercise may vary from person to person. You can tell how intense a workout is for you by paying attention to your breathing and heartbeat. Most people will notice their breathing and heartbeat get faster with more intense exercise.  · Do resistance training twice each week, such as:  ? Push-ups.  ? Sit-ups.  ? Lifting weights.  ? Using resistance bands.  · Getting short amounts of exercise can be just as helpful as long structured periods of exercise. If you have trouble finding time to exercise, try to include exercise in your daily routine.  ? Get up, stretch, and walk around every 30 minutes throughout the day.  ? Go for a walk during your lunch break.  ? Park your car farther away from your destination.  ? If you take public transportation, get off one stop early and walk the rest of the way.  ? Make phone calls while standing up and walking around.  ? Take the stairs instead of elevators or escalators.  · Wear comfortable clothes and shoes with good support.  · Do not exercise so much that you hurt yourself, feel dizzy, or get very short of breath.  Where to find more information  · [place] Department of Health and Human Services: www.hhs.gov  · Centers for Disease Control and Prevention (CDC): www.cdc.gov  Contact a health care provider:  · Before starting a new exercise program.  · If you have questions or concerns about your weight.  · If you have a medical  problem that keeps you from exercising.  Get help right away if you have any of the following while exercising:  · Injury.  · Dizziness.  · Difficulty breathing or shortness of breath that does not go away when you stop exercising.  · Chest pain.  · Rapid heartbeat.  Summary  · Being overweight increases your risk of heart disease, stroke, diabetes, high blood pressure, and several types of cancer.  · Losing weight happens when you burn more calories than you eat.  · Reducing the amount of calories you eat in addition to getting regular moderate or vigorous exercise each week helps you lose weight.  This information is not intended to replace advice given to you by your health care provider. Make sure you discuss any questions you have with your health care provider.  Document Released: 01/20/2012 Document Revised: 12/31/2018 Document Reviewed: 12/31/2018  [employer] Patient Education © 2020 [employer]

## 2020-07-17 NOTE — PROGRESS NOTES
Subjective   Modesta Kaur is a 54 y.o. female.     Here today for zhanna.  She has had a cva and cont to have   She cont to have right sided weakness.  She reports her b/s is running about .    Hypertension   This is a chronic problem. The current episode started more than 1 year ago. The problem is unchanged. The problem is controlled. Pertinent negatives include no anxiety, blurred vision, chest pain, headaches, malaise/fatigue, neck pain, orthopnea, palpitations, peripheral edema, PND, shortness of breath or sweats. There are no associated agents to hypertension. Risk factors for coronary artery disease include diabetes mellitus, dyslipidemia, obesity, post-menopausal state, sedentary lifestyle and stress. Past treatments include calcium channel blockers and angiotensin blockers. Current antihypertension treatment includes calcium channel blockers and angiotensin blockers. The current treatment provides significant improvement. There are no compliance problems.  Hypertensive end-organ damage includes CVA. There is no history of angina, kidney disease, CAD/MI, heart failure, left ventricular hypertrophy, PVD or retinopathy.   Diabetes   She presents for her follow-up diabetic visit. She has type 2 diabetes mellitus. Her disease course has been stable. There are no hypoglycemic associated symptoms. Pertinent negatives for hypoglycemia include no headaches or sweats. Associated symptoms include weakness (right sided). Pertinent negatives for diabetes include no blurred vision, no chest pain, no fatigue and no visual change. There are no hypoglycemic complications. Symptoms are stable. Diabetic complications include a CVA. Pertinent negatives for diabetic complications include no autonomic neuropathy, heart disease, impotence, nephropathy, peripheral neuropathy, PVD or retinopathy. Risk factors for coronary artery disease include post-menopausal, obesity, hypertension, dyslipidemia and diabetes mellitus.  Current diabetic treatment includes oral agent (monotherapy). She is compliant with treatment all of the time. Her weight is stable. She is following a diabetic diet. Meal planning includes avoidance of concentrated sweets. She has not had a previous visit with a dietitian. She rarely participates in exercise. She monitors blood glucose at home 1-2 x per day. Blood glucose monitoring compliance is good. Her breakfast blood glucose is taken between 7-8 am. Her breakfast blood glucose range is generally  mg/dl. Her overall blood glucose range is 110-130 mg/dl. An ACE inhibitor/angiotensin II receptor blocker is not being taken. She does not see a podiatrist.Eye exam is current.   Cerebrovascular Accident   This is a chronic problem. The current episode started more than 1 year ago. The problem occurs constantly. The problem has been unchanged. Associated symptoms include weakness (right sided). Pertinent negatives include no abdominal pain, anorexia, arthralgias, change in bowel habit, chest pain, chills, congestion, coughing, diaphoresis, fatigue, fever, headaches, joint swelling, myalgias, nausea, neck pain, numbness, rash, sore throat, swollen glands, urinary symptoms, vertigo, visual change or vomiting. The symptoms are aggravated by walking and stress. Treatments tried: plavix. The treatment provided significant relief.        The following portions of the patient's history were reviewed and updated as appropriate: allergies, current medications, past family history, past medical history, past social history, past surgical history and problem list.    Review of Systems   Constitutional: Negative for chills, diaphoresis, fatigue, fever and malaise/fatigue.   HENT: Negative.  Negative for congestion, sore throat and swollen glands.    Eyes: Negative.  Negative for blurred vision.   Respiratory: Negative.  Negative for cough and shortness of breath.    Cardiovascular: Negative.  Negative for chest pain,  palpitations, orthopnea and PND.   Gastrointestinal: Negative.  Negative for abdominal pain, anorexia, change in bowel habit, nausea and vomiting.   Endocrine: Negative.    Genitourinary: Negative.  Negative for impotence.   Musculoskeletal: Negative.  Negative for arthralgias, joint swelling, myalgias and neck pain.   Skin: Negative.  Negative for rash.   Allergic/Immunologic: Negative.    Neurological: Positive for weakness (right sided). Negative for vertigo and numbness.   Hematological: Negative.    Psychiatric/Behavioral: Negative.        Objective   Physical Exam   Constitutional: She is oriented to person, place, and time. She appears well-developed and well-nourished. No distress.   HENT:   Head: Normocephalic and atraumatic.   Mouth/Throat: No oropharyngeal exudate.   Eyes: Pupils are equal, round, and reactive to light.   Neck: Normal range of motion. Neck supple. No thyromegaly present.   Cardiovascular: Normal rate, regular rhythm and normal heart sounds. Exam reveals no friction rub.   No murmur heard.  Pulmonary/Chest: Effort normal and breath sounds normal. No respiratory distress. She has no wheezes. She has no rales.   Abdominal: Soft.   Musculoskeletal: Normal range of motion.   Cont to have right sided weakness due to cva    Modesta had a diabetic foot exam performed today.    Neurological Sensory Findings -  Altered sharp/dull right ankle/foot discrimination and altered sharp/dull left ankle/foot discrimination.  Vascular Status -  Her right foot exhibits normal foot vasculature  and no edema. Her left foot exhibits normal foot vasculature  and no edema.  Skin Integrity  -  Her right foot skin is intact.Her left foot skin is intact..  Neurological: She is alert and oriented to person, place, and time.   Skin: Skin is warm and dry.   Psychiatric: She has a normal mood and affect. Thought content normal.   Nursing note and vitals reviewed.        Assessment/Plan   Modesta was seen today for  hypertension, diabetes and cerebrovascular accident.    Diagnoses and all orders for this visit:    Type 2 diabetes mellitus without complication, without long-term current use of insulin (CMS/[facility])    Essential hypertension    Cerebrovascular accident (CVA), unspecified mechanism (CMS/[facility])    does not need refills today.      bmi is noted to be 34.2 which is considered obese.  Diet and exercise info provided.

## 2020-08-23 DIAGNOSIS — I10 ESSENTIAL HYPERTENSION: ICD-10-CM

## 2020-08-23 DIAGNOSIS — I63.9 CEREBROVASCULAR ACCIDENT (CVA), UNSPECIFIED MECHANISM (HCC): ICD-10-CM

## 2020-08-24 RX ORDER — FERROUS SULFATE 325(65) MG
TABLET ORAL
Qty: 60 TABLET | Refills: 3 | Status: SHIPPED | OUTPATIENT
Start: 2020-08-24 | End: 2020-12-21

## 2020-08-24 RX ORDER — TIZANIDINE 4 MG/1
TABLET ORAL
Qty: 90 TABLET | Refills: 3 | Status: SHIPPED | OUTPATIENT
Start: 2020-08-24 | End: 2020-12-21

## 2020-08-24 RX ORDER — LOSARTAN POTASSIUM 100 MG/1
TABLET ORAL
Qty: 30 TABLET | Refills: 5 | Status: SHIPPED | OUTPATIENT
Start: 2020-08-24 | End: 2021-03-19

## 2020-08-24 RX ORDER — CLOPIDOGREL BISULFATE 75 MG/1
TABLET ORAL
Qty: 30 TABLET | Refills: 5 | Status: SHIPPED | OUTPATIENT
Start: 2020-08-24 | End: 2021-03-19

## 2020-09-22 DIAGNOSIS — K21.9 GASTROESOPHAGEAL REFLUX DISEASE, ESOPHAGITIS PRESENCE NOT SPECIFIED: ICD-10-CM

## 2020-09-22 DIAGNOSIS — E78.5 HYPERLIPIDEMIA, UNSPECIFIED HYPERLIPIDEMIA TYPE: ICD-10-CM

## 2020-09-22 RX ORDER — ATORVASTATIN CALCIUM 40 MG/1
TABLET, FILM COATED ORAL
Qty: 30 TABLET | Refills: 3 | Status: SHIPPED | OUTPATIENT
Start: 2020-09-22 | End: 2021-01-25

## 2020-09-22 RX ORDER — OMEPRAZOLE 20 MG/1
CAPSULE, DELAYED RELEASE ORAL
Qty: 30 CAPSULE | Refills: 3 | Status: SHIPPED | OUTPATIENT
Start: 2020-09-22 | End: 2021-01-25

## 2020-09-22 RX ORDER — POTASSIUM CHLORIDE 750 MG/1
TABLET, EXTENDED RELEASE ORAL
Qty: 39 TABLET | Refills: 3 | Status: SHIPPED | OUTPATIENT
Start: 2020-09-22 | End: 2021-06-19

## 2020-09-24 RX ORDER — POTASSIUM CHLORIDE 750 MG/1
10 TABLET, FILM COATED, EXTENDED RELEASE ORAL DAILY
Qty: 39 TABLET | Refills: 3 | Status: SHIPPED | OUTPATIENT
Start: 2020-09-24 | End: 2021-06-19

## 2020-10-15 ENCOUNTER — LAB (OUTPATIENT)
Dept: LAB | Facility: HOSPITAL | Age: 55
End: 2020-10-15

## 2020-10-15 ENCOUNTER — OFFICE VISIT (OUTPATIENT)
Dept: FAMILY MEDICINE CLINIC | Facility: CLINIC | Age: 55
End: 2020-10-15

## 2020-10-15 VITALS
SYSTOLIC BLOOD PRESSURE: 147 MMHG | DIASTOLIC BLOOD PRESSURE: 91 MMHG | WEIGHT: 174 LBS | TEMPERATURE: 97.1 F | HEART RATE: 113 BPM | OXYGEN SATURATION: 98 % | BODY MASS INDEX: 34.16 KG/M2 | HEIGHT: 60 IN | RESPIRATION RATE: 20 BRPM

## 2020-10-15 DIAGNOSIS — I69.951 HEMIPARESIS OF RIGHT DOMINANT SIDE AS LATE EFFECT OF CEREBROVASCULAR DISEASE, UNSPECIFIED CEREBROVASCULAR DISEASE TYPE (HCC): Primary | ICD-10-CM

## 2020-10-15 DIAGNOSIS — I10 ESSENTIAL HYPERTENSION: ICD-10-CM

## 2020-10-15 DIAGNOSIS — I63.9 CEREBROVASCULAR ACCIDENT (CVA), UNSPECIFIED MECHANISM (HCC): ICD-10-CM

## 2020-10-15 DIAGNOSIS — E11.9 TYPE 2 DIABETES MELLITUS WITHOUT COMPLICATION, WITHOUT LONG-TERM CURRENT USE OF INSULIN (HCC): ICD-10-CM

## 2020-10-15 PROCEDURE — 99214 OFFICE O/P EST MOD 30 MIN: CPT | Performed by: NURSE PRACTITIONER

## 2020-10-15 PROCEDURE — 80053 COMPREHEN METABOLIC PANEL: CPT | Performed by: NURSE PRACTITIONER

## 2020-10-15 PROCEDURE — 83036 HEMOGLOBIN GLYCOSYLATED A1C: CPT | Performed by: NURSE PRACTITIONER

## 2020-10-16 LAB
ALBUMIN SERPL-MCNC: 4.5 G/DL (ref 3.5–5.2)
ALBUMIN/GLOB SERPL: 1.4 G/DL
ALP SERPL-CCNC: 175 U/L (ref 39–117)
ALT SERPL W P-5'-P-CCNC: 18 U/L (ref 1–33)
ANION GAP SERPL CALCULATED.3IONS-SCNC: 9.8 MMOL/L (ref 5–15)
AST SERPL-CCNC: 16 U/L (ref 1–32)
BILIRUB SERPL-MCNC: 0.7 MG/DL (ref 0–1.2)
BUN SERPL-MCNC: 17 MG/DL (ref 6–20)
BUN/CREAT SERPL: 23.6 (ref 7–25)
CALCIUM SPEC-SCNC: 9.8 MG/DL (ref 8.6–10.5)
CHLORIDE SERPL-SCNC: 101 MMOL/L (ref 98–107)
CO2 SERPL-SCNC: 24.2 MMOL/L (ref 22–29)
CREAT SERPL-MCNC: 0.72 MG/DL (ref 0.57–1)
GFR SERPL CREATININE-BSD FRML MDRD: 102 ML/MIN/1.73
GLOBULIN UR ELPH-MCNC: 3.2 GM/DL
GLUCOSE SERPL-MCNC: 59 MG/DL (ref 65–99)
HBA1C MFR BLD: 6.3 % (ref 4.8–5.6)
POTASSIUM SERPL-SCNC: 4 MMOL/L (ref 3.5–5.2)
PROT SERPL-MCNC: 7.7 G/DL (ref 6–8.5)
SODIUM SERPL-SCNC: 135 MMOL/L (ref 136–145)

## 2020-10-16 NOTE — PATIENT INSTRUCTIONS
Calorie Counting for Weight Loss  Calories are units of energy. Your body needs a certain amount of calories from food to keep you going throughout the day. When you eat more calories than your body needs, your body stores the extra calories as fat. When you eat fewer calories than your body needs, your body burns fat to get the energy it needs.  Calorie counting means keeping track of how many calories you eat and drink each day. Calorie counting can be helpful if you need to lose weight. If you make sure to eat fewer calories than your body needs, you should lose weight. Ask your health care provider what a healthy weight is for you.  For calorie counting to work, you will need to eat the right number of calories in a day in order to lose a healthy amount of weight per week. A dietitian can help you determine how many calories you need in a day and will give you suggestions on how to reach your calorie goal.  · A healthy amount of weight to lose per week is usually 1-2 lb (0.5-0.9 kg). This usually means that your daily calorie intake should be reduced by 500-750 calories.  · Eating 1,200 - 1,500 calories per day can help most women lose weight.  · Eating 1,500 - 1,800 calories per day can help most men lose weight.  What is my plan?  My goal is to have __________ calories per day.  If I have this many calories per day, I should lose around __________ pounds per week.  What do I need to know about calorie counting?  In order to meet your daily calorie goal, you will need to:  · Find out how many calories are in each food you would like to eat. Try to do this before you eat.  · Decide how much of the food you plan to eat.  · Write down what you ate and how many calories it had. Doing this is called keeping a food log.  To successfully lose weight, it is important to balance calorie counting with a healthy lifestyle that includes regular activity. Aim for 150 minutes of moderate exercise (such as walking) or 75  minutes of vigorous exercise (such as running) each week.  Where do I find calorie information?    The number of calories in a food can be found on a Nutrition Facts label. If a food does not have a Nutrition Facts label, try to look up the calories online or ask your dietitian for help.  Remember that calories are listed per serving. If you choose to have more than one serving of a food, you will have to multiply the calories per serving by the amount of servings you plan to eat. For example, the label on a package of bread might say that a serving size is 1 slice and that there are 90 calories in a serving. If you eat 1 slice, you will have eaten 90 calories. If you eat 2 slices, you will have eaten 180 calories.  How do I keep a food log?  Immediately after each meal, record the following information in your food log:  · What you ate. Don't forget to include toppings, sauces, and other extras on the food.  · How much you ate. This can be measured in cups, ounces, or number of items.  · How many calories each food and drink had.  · The total number of calories in the meal.  Keep your food log near you, such as in a small notebook in your pocket, or use a mobile yolanda or website. Some programs will calculate calories for you and show you how many calories you have left for the day to meet your goal.  What are some calorie counting tips?    · Use your calories on foods and drinks that will fill you up and not leave you hungry:  ? Some examples of foods that fill you up are nuts and nut butters, vegetables, lean proteins, and high-fiber foods like whole grains. High-fiber foods are foods with more than 5 g fiber per serving.  ? Drinks such as sodas, specialty coffee drinks, alcohol, and juices have a lot of calories, yet do not fill you up.  · Eat nutritious foods and avoid empty calories. Empty calories are calories you get from foods or beverages that do not have many vitamins or protein, such as candy, sweets, and  "soda. It is better to have a nutritious high-calorie food (such as an avocado) than a food with few nutrients (such as a bag of chips).  · Know how many calories are in the foods you eat most often. This will help you calculate calorie counts faster.  · Pay attention to calories in drinks. Low-calorie drinks include water and unsweetened drinks.  · Pay attention to nutrition labels for \"low fat\" or \"fat free\" foods. These foods sometimes have the same amount of calories or more calories than the full fat versions. They also often have added sugar, starch, or salt, to make up for flavor that was removed with the fat.  · Find a way of tracking calories that works for you. Get creative. Try different apps or programs if writing down calories does not work for you.  What are some portion control tips?  · Know how many calories are in a serving. This will help you know how many servings of a certain food you can have.  · Use a measuring cup to measure serving sizes. You could also try weighing out portions on a kitchen scale. With time, you will be able to estimate serving sizes for some foods.  · Take some time to put servings of different foods on your favorite plates, bowls, and cups so you know what a serving looks like.  · Try not to eat straight from a bag or box. Doing this can lead to overeating. Put the amount you would like to eat in a cup or on a plate to make sure you are eating the right portion.  · Use smaller plates, glasses, and bowls to prevent overeating.  · Try not to multitask (for example, watch TV or use your computer) while eating. If it is time to eat, sit down at a table and enjoy your food. This will help you to know when you are full. It will also help you to be aware of what you are eating and how much you are eating.  What are tips for following this plan?  Reading food labels  · Check the calorie count compared to the serving size. The serving size may be smaller than what you are used to " "eating.  · Check the source of the calories. Make sure the food you are eating is high in vitamins and protein and low in saturated and trans fats.  Shopping  · Read nutrition labels while you shop. This will help you make healthy decisions before you decide to purchase your food.  · Make a grocery list and stick to it.  Cooking  · Try to cook your favorite foods in a healthier way. For example, try baking instead of frying.  · Use low-fat dairy products.  Meal planning  · Use more fruits and vegetables. Half of your plate should be fruits and vegetables.  · Include lean proteins like poultry and fish.  How do I count calories when eating out?  · Ask for smaller portion sizes.  · Consider sharing an entree and sides instead of getting your own entree.  · If you get your own entree, eat only half. Ask for a box at the beginning of your meal and put the rest of your entree in it so you are not tempted to eat it.  · If calories are listed on the menu, choose the lower calorie options.  · Choose dishes that include vegetables, fruits, whole grains, low-fat dairy products, and lean protein.  · Choose items that are boiled, broiled, grilled, or steamed. Stay away from items that are buttered, battered, fried, or served with cream sauce. Items labeled \"crispy\" are usually fried, unless stated otherwise.  · Choose water, low-fat milk, unsweetened iced tea, or other drinks without added sugar. If you want an alcoholic beverage, choose a lower calorie option such as a glass of wine or light beer.  · Ask for dressings, sauces, and syrups on the side. These are usually high in calories, so you should limit the amount you eat.  · If you want a salad, choose a garden salad and ask for grilled meats. Avoid extra toppings like nunez, cheese, or fried items. Ask for the dressing on the side, or ask for olive oil and vinegar or lemon to use as dressing.  · Estimate how many servings of a food you are given. For example, a serving of " cooked rice is ½ cup or about the size of half a baseball. Knowing serving sizes will help you be aware of how much food you are eating at restaurants. The list below tells you how big or small some common portion sizes are based on everyday objects:  ? 1 oz--4 stacked dice.  ? 3 oz--1 deck of cards.  ? 1 tsp--1 die.  ? 1 Tbsp--½ a ping-pong ball.  ? 2 Tbsp--1 ping-pong ball.  ? ½ cup--½ baseball.  ? 1 cup--1 baseball.  Summary  · Calorie counting means keeping track of how many calories you eat and drink each day. If you eat fewer calories than your body needs, you should lose weight.  · A healthy amount of weight to lose per week is usually 1-2 lb (0.5-0.9 kg). This usually means reducing your daily calorie intake by 500-750 calories.  · The number of calories in a food can be found on a Nutrition Facts label. If a food does not have a Nutrition Facts label, try to look up the calories online or ask your dietitian for help.  · Use your calories on foods and drinks that will fill you up, and not on foods and drinks that will leave you hungry.  · Use smaller plates, glasses, and bowls to prevent overeating.  This information is not intended to replace advice given to you by your health care provider. Make sure you discuss any questions you have with your health care provider.  Document Released: 12/18/2006 Document Revised: 09/06/2019 Document Reviewed: 11/17/2017  [email or web address] Patient Education © 2020 [email or web address]      Exercising to Lose Weight  Exercise is structured, repetitive physical activity to improve fitness and health. Getting regular exercise is important for everyone. It is especially important if you are overweight. Being overweight increases your risk of heart disease, stroke, diabetes, high blood pressure, and several types of cancer. Reducing your calorie intake and exercising can help you lose weight.  Exercise is usually categorized as moderate or vigorous intensity. To lose weight, most people need  to do a certain amount of moderate-intensity or vigorous-intensity exercise each week.  Moderate-intensity exercise    Moderate-intensity exercise is any activity that gets you moving enough to burn at least three times more energy (calories) than if you were sitting.  Examples of moderate exercise include:  · Walking a mile in 15 minutes.  · Doing light yard work.  · Biking at an easy pace.  Most people should get at least 150 minutes (2 hours and 30 minutes) a week of moderate-intensity exercise to maintain their body weight.  Vigorous-intensity exercise  Vigorous-intensity exercise is any activity that gets you moving enough to burn at least six times more calories than if you were sitting. When you exercise at this intensity, you should be working hard enough that you are not able to carry on a conversation.  Examples of vigorous exercise include:  · Running.  · Playing a team sport, such as football, basketball, and soccer.  · Jumping rope.  Most people should get at least 75 minutes (1 hour and 15 minutes) a week of vigorous-intensity exercise to maintain their body weight.  How can exercise affect me?  When you exercise enough to burn more calories than you eat, you lose weight. Exercise also reduces body fat and builds muscle. The more muscle you have, the more calories you burn. Exercise also:  · Improves mood.  · Reduces stress and tension.  · Improves your overall fitness, flexibility, and endurance.  · Increases bone strength.  The amount of exercise you need to lose weight depends on:  · Your age.  · The type of exercise.  · Any health conditions you have.  · Your overall physical ability.  Talk to your health care provider about how much exercise you need and what types of activities are safe for you.  What actions can I take to lose weight?  Nutrition    · Make changes to your diet as told by your health care provider or diet and nutrition specialist (dietitian). This may include:  ? Eating fewer  calories.  ? Eating more protein.  ? Eating less unhealthy fats.  ? Eating a diet that includes fresh fruits and vegetables, whole grains, low-fat dairy products, and lean protein.  ? Avoiding foods with added fat, salt, and sugar.  · Drink plenty of water while you exercise to prevent dehydration or heat stroke.  Activity  · Choose an activity that you enjoy and set realistic goals. Your health care provider can help you make an exercise plan that works for you.  · Exercise at a moderate or vigorous intensity most days of the week.  ? The intensity of exercise may vary from person to person. You can tell how intense a workout is for you by paying attention to your breathing and heartbeat. Most people will notice their breathing and heartbeat get faster with more intense exercise.  · Do resistance training twice each week, such as:  ? Push-ups.  ? Sit-ups.  ? Lifting weights.  ? Using resistance bands.  · Getting short amounts of exercise can be just as helpful as long structured periods of exercise. If you have trouble finding time to exercise, try to include exercise in your daily routine.  ? Get up, stretch, and walk around every 30 minutes throughout the day.  ? Go for a walk during your lunch break.  ? Park your car farther away from your destination.  ? If you take public transportation, get off one stop early and walk the rest of the way.  ? Make phone calls while standing up and walking around.  ? Take the stairs instead of elevators or escalators.  · Wear comfortable clothes and shoes with good support.  · Do not exercise so much that you hurt yourself, feel dizzy, or get very short of breath.  Where to find more information  · [place] Department of Health and Human Services: www.hhs.gov  · Centers for Disease Control and Prevention (CDC): www.cdc.gov  Contact a health care provider:  · Before starting a new exercise program.  · If you have questions or concerns about your weight.  · If you have a medical  problem that keeps you from exercising.  Get help right away if you have any of the following while exercising:  · Injury.  · Dizziness.  · Difficulty breathing or shortness of breath that does not go away when you stop exercising.  · Chest pain.  · Rapid heartbeat.  Summary  · Being overweight increases your risk of heart disease, stroke, diabetes, high blood pressure, and several types of cancer.  · Losing weight happens when you burn more calories than you eat.  · Reducing the amount of calories you eat in addition to getting regular moderate or vigorous exercise each week helps you lose weight.  This information is not intended to replace advice given to you by your health care provider. Make sure you discuss any questions you have with your health care provider.  Document Released: 01/20/2012 Document Revised: 12/31/2018 Document Reviewed: 12/31/2018  [employer] Patient Education © 2020 [employer]

## 2020-10-16 NOTE — PROGRESS NOTES
Subjective   Modesta Kaur is a 54 y.o. female.     Modesta Kaur age 54 comes in today for recheck on her diabetes hypertension and CVA.  She continues to have severe right-sided weakness and is wanting to have more physical therapy done.  She reports that her blood sugars running about 92.    Diabetes  She presents for her follow-up diabetic visit. She has type 2 diabetes mellitus. Her disease course has been stable. Pertinent negatives for hypoglycemia include no headaches or sweats. Associated symptoms include weakness (  Right-sided weakness due to CVA). Pertinent negatives for diabetes include no blurred vision, no chest pain, no fatigue and no visual change. There are no hypoglycemic complications. Symptoms are stable. Diabetic complications include a CVA. Pertinent negatives for diabetic complications include no autonomic neuropathy, heart disease, impotence, nephropathy, peripheral neuropathy, PVD or retinopathy. Risk factors for coronary artery disease include diabetes mellitus, dyslipidemia, obesity, hypertension, post-menopausal and sedentary lifestyle. Current diabetic treatment includes oral agent (monotherapy). She is compliant with treatment all of the time. Her weight is stable. She is following a diabetic diet. When asked about meal planning, she reported none. She has not had a previous visit with a dietitian. She rarely participates in exercise. She monitors blood glucose at home 1-2 x per day. Her breakfast blood glucose is taken between 6-7 am. Her breakfast blood glucose range is generally  mg/dl. Her overall blood glucose range is  mg/dl. An ACE inhibitor/angiotensin II receptor blocker is being taken. She does not see a podiatrist.Eye exam is current.   Hypertension  This is a chronic problem. The current episode started more than 1 year ago. The problem has been waxing and waning since onset. The problem is controlled. Pertinent negatives include no anxiety, blurred vision,  chest pain, headaches, malaise/fatigue, neck pain, orthopnea, palpitations, peripheral edema, PND, shortness of breath or sweats. There are no associated agents to hypertension. Risk factors for coronary artery disease include diabetes mellitus, dyslipidemia, obesity, post-menopausal state and sedentary lifestyle. Past treatments include angiotensin blockers, calcium channel blockers and diuretics. Current antihypertension treatment includes lifestyle changes. The current treatment provides significant improvement. There are no compliance problems.  Hypertensive end-organ damage includes CVA. There is no history of CAD/MI, heart failure, left ventricular hypertrophy, PVD or retinopathy.   Cerebrovascular Accident  This is a chronic problem. The current episode started more than 1 year ago. The problem occurs constantly. The problem has been unchanged. Associated symptoms include arthralgias and weakness (  Right-sided weakness due to CVA). Pertinent negatives include no abdominal pain, anorexia, change in bowel habit, chest pain, chills, congestion, coughing, diaphoresis, fatigue, fever, headaches, joint swelling, myalgias, nausea, neck pain, numbness, rash, sore throat, swollen glands, urinary symptoms, vertigo, visual change or vomiting. The symptoms are aggravated by exertion. She has tried nothing (She has had physical therapy in the past it was beneficial) for the symptoms. The treatment provided significant relief.        The following portions of the patient's history were reviewed and updated as appropriate: allergies, current medications, past family history, past medical history, past social history, past surgical history and problem list.    Review of Systems   Constitutional: Negative for chills, diaphoresis, fatigue, fever and malaise/fatigue.   HENT: Negative.  Negative for congestion, sore throat and swollen glands.    Eyes: Negative.  Negative for blurred vision.   Respiratory: Negative.  Negative  for cough and shortness of breath.    Cardiovascular: Negative.  Negative for chest pain, palpitations, orthopnea and PND.   Gastrointestinal: Negative.  Negative for abdominal pain, anorexia, change in bowel habit, nausea and vomiting.   Endocrine: Negative.    Genitourinary: Negative.  Negative for impotence.   Musculoskeletal: Positive for arthralgias. Negative for joint swelling, myalgias and neck pain.   Skin: Negative.  Negative for rash.   Allergic/Immunologic: Negative.    Neurological: Positive for weakness (  Right-sided weakness due to CVA). Negative for vertigo and numbness.   Hematological: Negative.        Objective   Physical Exam  Vitals signs and nursing note reviewed.   Constitutional:       General: She is not in acute distress.     Appearance: She is well-developed.   HENT:      Head: Normocephalic and atraumatic.      Right Ear: External ear normal.      Left Ear: External ear normal.      Nose: Nose normal.      Mouth/Throat:      Pharynx: No oropharyngeal exudate.   Eyes:      Pupils: Pupils are equal, round, and reactive to light.   Neck:      Musculoskeletal: Normal range of motion and neck supple.      Thyroid: No thyromegaly.   Cardiovascular:      Rate and Rhythm: Normal rate and regular rhythm.      Heart sounds: Normal heart sounds. No murmur. No friction rub.   Pulmonary:      Effort: Pulmonary effort is normal. No respiratory distress.      Breath sounds: Normal breath sounds. No wheezing or rales.   Abdominal:      Palpations: Abdomen is soft.   Musculoskeletal: Normal range of motion.      Comments: She has little or no use of her right arm and hand.  And she ambulates slowly and difficulty with a cane.   Skin:     General: Skin is warm and dry.   Neurological:      Mental Status: She is alert and oriented to person, place, and time.   Psychiatric:         Thought Content: Thought content normal.           Assessment/Plan   Diagnoses and all orders for this visit:    1. Essential  hypertension (Primary)  -     Comprehensive metabolic panel    2. Type 2 diabetes mellitus without complication, without long-term current use of insulin (CMS/[facility])  -     Hemoglobin A1c

## 2020-10-22 DIAGNOSIS — E11.9 TYPE 2 DIABETES MELLITUS WITHOUT COMPLICATION, WITHOUT LONG-TERM CURRENT USE OF INSULIN (HCC): ICD-10-CM

## 2020-10-22 DIAGNOSIS — I10 ESSENTIAL HYPERTENSION: ICD-10-CM

## 2020-10-22 RX ORDER — ERGOCALCIFEROL 1.25 MG/1
CAPSULE ORAL
Qty: 4 CAPSULE | Refills: 5 | Status: SHIPPED | OUTPATIENT
Start: 2020-10-22 | End: 2021-06-12

## 2020-10-22 RX ORDER — AMLODIPINE BESYLATE 10 MG/1
TABLET ORAL
Qty: 30 TABLET | Refills: 5 | Status: SHIPPED | OUTPATIENT
Start: 2020-10-22 | End: 2021-05-01 | Stop reason: SDUPTHER

## 2020-12-21 RX ORDER — TIZANIDINE 4 MG/1
TABLET ORAL
Qty: 90 TABLET | Refills: 3 | Status: SHIPPED | OUTPATIENT
Start: 2020-12-21 | End: 2021-05-01 | Stop reason: SDUPTHER

## 2020-12-21 RX ORDER — FERROUS SULFATE 325(65) MG
TABLET ORAL
Qty: 60 TABLET | Refills: 3 | Status: SHIPPED | OUTPATIENT
Start: 2020-12-21 | End: 2021-05-01 | Stop reason: SDUPTHER

## 2020-12-21 RX ORDER — ASPIRIN 81 MG/1
TABLET, COATED ORAL
Qty: 30 TABLET | Refills: 5 | Status: SHIPPED | OUTPATIENT
Start: 2020-12-21 | End: 2021-07-22

## 2021-01-20 DIAGNOSIS — K21.9 GASTROESOPHAGEAL REFLUX DISEASE: ICD-10-CM

## 2021-01-20 DIAGNOSIS — E78.5 HYPERLIPIDEMIA, UNSPECIFIED HYPERLIPIDEMIA TYPE: ICD-10-CM

## 2021-01-25 RX ORDER — OMEPRAZOLE 20 MG/1
CAPSULE, DELAYED RELEASE ORAL
Qty: 30 CAPSULE | Refills: 3 | Status: SHIPPED | OUTPATIENT
Start: 2021-01-25 | End: 2021-06-02 | Stop reason: ALTCHOICE

## 2021-01-25 RX ORDER — ATORVASTATIN CALCIUM 40 MG/1
TABLET, FILM COATED ORAL
Qty: 30 TABLET | Refills: 3 | Status: SHIPPED | OUTPATIENT
Start: 2021-01-25 | End: 2021-03-19

## 2021-03-19 DIAGNOSIS — E78.5 HYPERLIPIDEMIA, UNSPECIFIED HYPERLIPIDEMIA TYPE: ICD-10-CM

## 2021-03-19 DIAGNOSIS — I10 ESSENTIAL HYPERTENSION: ICD-10-CM

## 2021-03-19 DIAGNOSIS — I63.9 CEREBROVASCULAR ACCIDENT (CVA), UNSPECIFIED MECHANISM (HCC): ICD-10-CM

## 2021-03-19 RX ORDER — ATORVASTATIN CALCIUM 40 MG/1
TABLET, FILM COATED ORAL
Qty: 30 TABLET | Refills: 3 | Status: SHIPPED | OUTPATIENT
Start: 2021-03-19 | End: 2021-07-22

## 2021-03-19 RX ORDER — CLOPIDOGREL BISULFATE 75 MG/1
TABLET ORAL
Qty: 30 TABLET | Refills: 3 | Status: SHIPPED | OUTPATIENT
Start: 2021-03-19 | End: 2021-07-22

## 2021-03-19 RX ORDER — LOSARTAN POTASSIUM 100 MG/1
TABLET ORAL
Qty: 30 TABLET | Refills: 3 | Status: SHIPPED | OUTPATIENT
Start: 2021-03-19 | End: 2021-07-22

## 2021-04-20 DIAGNOSIS — I10 ESSENTIAL HYPERTENSION: ICD-10-CM

## 2021-04-20 DIAGNOSIS — E11.9 TYPE 2 DIABETES MELLITUS WITHOUT COMPLICATION, WITHOUT LONG-TERM CURRENT USE OF INSULIN (HCC): ICD-10-CM

## 2021-04-30 ENCOUNTER — LAB (OUTPATIENT)
Dept: LAB | Facility: HOSPITAL | Age: 56
End: 2021-04-30

## 2021-04-30 DIAGNOSIS — I10 ESSENTIAL HYPERTENSION: Primary | ICD-10-CM

## 2021-04-30 DIAGNOSIS — E11.9 TYPE 2 DIABETES MELLITUS WITHOUT COMPLICATION, WITHOUT LONG-TERM CURRENT USE OF INSULIN (HCC): ICD-10-CM

## 2021-04-30 DIAGNOSIS — I10 ESSENTIAL HYPERTENSION: ICD-10-CM

## 2021-04-30 PROCEDURE — 80061 LIPID PANEL: CPT

## 2021-04-30 PROCEDURE — 83036 HEMOGLOBIN GLYCOSYLATED A1C: CPT

## 2021-04-30 PROCEDURE — 80053 COMPREHEN METABOLIC PANEL: CPT

## 2021-04-30 PROCEDURE — 82043 UR ALBUMIN QUANTITATIVE: CPT

## 2021-04-30 PROCEDURE — 85027 COMPLETE CBC AUTOMATED: CPT

## 2021-05-01 DIAGNOSIS — E55.9 VITAMIN D DEFICIENCY: Primary | ICD-10-CM

## 2021-05-01 DIAGNOSIS — E78.5 HYPERLIPIDEMIA, UNSPECIFIED HYPERLIPIDEMIA TYPE: ICD-10-CM

## 2021-05-01 DIAGNOSIS — D64.9 ANEMIA, UNSPECIFIED TYPE: Primary | ICD-10-CM

## 2021-05-01 DIAGNOSIS — I10 ESSENTIAL HYPERTENSION: ICD-10-CM

## 2021-05-01 DIAGNOSIS — M62.838 MUSCLE SPASM: ICD-10-CM

## 2021-05-01 DIAGNOSIS — E11.9 TYPE 2 DIABETES MELLITUS WITHOUT COMPLICATION, WITHOUT LONG-TERM CURRENT USE OF INSULIN (HCC): ICD-10-CM

## 2021-05-01 LAB
ALBUMIN SERPL-MCNC: 4.7 G/DL (ref 3.5–5.2)
ALBUMIN UR-MCNC: 2 MG/DL
ALBUMIN/GLOB SERPL: 1.5 G/DL
ALP SERPL-CCNC: 182 U/L (ref 39–117)
ALT SERPL W P-5'-P-CCNC: 20 U/L (ref 1–33)
ANION GAP SERPL CALCULATED.3IONS-SCNC: 14.4 MMOL/L (ref 5–15)
AST SERPL-CCNC: 15 U/L (ref 1–32)
BILIRUB SERPL-MCNC: 0.7 MG/DL (ref 0–1.2)
BUN SERPL-MCNC: 15 MG/DL (ref 6–20)
BUN/CREAT SERPL: 20.8 (ref 7–25)
CALCIUM SPEC-SCNC: 9.8 MG/DL (ref 8.6–10.5)
CHLORIDE SERPL-SCNC: 102 MMOL/L (ref 98–107)
CHOLEST SERPL-MCNC: 172 MG/DL (ref 0–200)
CO2 SERPL-SCNC: 21.6 MMOL/L (ref 22–29)
CREAT SERPL-MCNC: 0.72 MG/DL (ref 0.57–1)
DEPRECATED RDW RBC AUTO: 45.5 FL (ref 37–54)
ERYTHROCYTE [DISTWIDTH] IN BLOOD BY AUTOMATED COUNT: 13.6 % (ref 12.3–15.4)
GFR SERPL CREATININE-BSD FRML MDRD: 102 ML/MIN/1.73
GLOBULIN UR ELPH-MCNC: 3.2 GM/DL
GLUCOSE SERPL-MCNC: 74 MG/DL (ref 65–99)
HBA1C MFR BLD: 6.55 % (ref 4.8–5.6)
HCT VFR BLD AUTO: 42.8 % (ref 34–46.6)
HDLC SERPL-MCNC: 43 MG/DL (ref 40–60)
HGB BLD-MCNC: 14.1 G/DL (ref 12–15.9)
LDLC SERPL CALC-MCNC: 96 MG/DL (ref 0–100)
LDLC/HDLC SERPL: 2.1 {RATIO}
MCH RBC QN AUTO: 30.6 PG (ref 26.6–33)
MCHC RBC AUTO-ENTMCNC: 32.9 G/DL (ref 31.5–35.7)
MCV RBC AUTO: 92.8 FL (ref 79–97)
PLATELET # BLD AUTO: 339 10*3/MM3 (ref 140–450)
PMV BLD AUTO: 11.7 FL (ref 6–12)
POTASSIUM SERPL-SCNC: 3.9 MMOL/L (ref 3.5–5.2)
PROT SERPL-MCNC: 7.9 G/DL (ref 6–8.5)
RBC # BLD AUTO: 4.61 10*6/MM3 (ref 3.77–5.28)
SODIUM SERPL-SCNC: 138 MMOL/L (ref 136–145)
TRIGL SERPL-MCNC: 193 MG/DL (ref 0–150)
VLDLC SERPL-MCNC: 33 MG/DL (ref 5–40)
WBC # BLD AUTO: 10.72 10*3/MM3 (ref 3.4–10.8)

## 2021-05-01 RX ORDER — AMLODIPINE BESYLATE 10 MG/1
10 TABLET ORAL EVERY MORNING
Qty: 30 TABLET | Refills: 5 | Status: SHIPPED | OUTPATIENT
Start: 2021-05-01 | End: 2021-10-18

## 2021-05-01 RX ORDER — FERROUS SULFATE 325(65) MG
1 TABLET ORAL 2 TIMES DAILY
Qty: 60 TABLET | Refills: 5 | Status: SHIPPED | OUTPATIENT
Start: 2021-05-01 | End: 2021-10-18

## 2021-05-01 RX ORDER — FERROUS SULFATE 325(65) MG
TABLET ORAL
Qty: 60 TABLET | Refills: 3 | OUTPATIENT
Start: 2021-05-01

## 2021-05-01 RX ORDER — AMLODIPINE BESYLATE 10 MG/1
TABLET ORAL
Qty: 30 TABLET | Refills: 5 | OUTPATIENT
Start: 2021-05-01

## 2021-05-01 RX ORDER — TIZANIDINE 4 MG/1
TABLET ORAL
Qty: 90 TABLET | Refills: 3 | OUTPATIENT
Start: 2021-05-01

## 2021-05-01 RX ORDER — ERGOCALCIFEROL 1.25 MG/1
CAPSULE ORAL
Qty: 4 CAPSULE | Refills: 5 | OUTPATIENT
Start: 2021-05-01

## 2021-05-01 RX ORDER — TIZANIDINE 4 MG/1
4 TABLET ORAL EVERY 8 HOURS PRN
Qty: 90 TABLET | Refills: 3 | Status: SHIPPED | OUTPATIENT
Start: 2021-05-01 | End: 2021-08-18

## 2021-05-23 DIAGNOSIS — K21.9 GASTROESOPHAGEAL REFLUX DISEASE: ICD-10-CM

## 2021-05-25 ENCOUNTER — TELEPHONE (OUTPATIENT)
Dept: FAMILY MEDICINE CLINIC | Facility: CLINIC | Age: 56
End: 2021-05-25

## 2021-05-25 NOTE — TELEPHONE ENCOUNTER
Patients  called  And stated that they received the letter you had sent her. He is wanting to know what he is supposed to do please call him at [phone]

## 2021-05-28 RX ORDER — OMEPRAZOLE 20 MG/1
CAPSULE, DELAYED RELEASE ORAL
Qty: 30 CAPSULE | Refills: 3 | OUTPATIENT
Start: 2021-05-28

## 2021-06-02 ENCOUNTER — OFFICE VISIT (OUTPATIENT)
Dept: FAMILY MEDICINE CLINIC | Facility: CLINIC | Age: 56
End: 2021-06-02

## 2021-06-02 ENCOUNTER — LAB (OUTPATIENT)
Dept: LAB | Facility: HOSPITAL | Age: 56
End: 2021-06-02

## 2021-06-02 VITALS
RESPIRATION RATE: 20 BRPM | SYSTOLIC BLOOD PRESSURE: 138 MMHG | HEIGHT: 60 IN | HEART RATE: 114 BPM | WEIGHT: 180.6 LBS | DIASTOLIC BLOOD PRESSURE: 86 MMHG | BODY MASS INDEX: 35.46 KG/M2 | TEMPERATURE: 97.7 F | OXYGEN SATURATION: 99 %

## 2021-06-02 DIAGNOSIS — I69.853: ICD-10-CM

## 2021-06-02 DIAGNOSIS — K21.9 GASTROESOPHAGEAL REFLUX DISEASE WITHOUT ESOPHAGITIS: ICD-10-CM

## 2021-06-02 DIAGNOSIS — E55.9 VITAMIN D DEFICIENCY: ICD-10-CM

## 2021-06-02 DIAGNOSIS — I63.9 CEREBROVASCULAR ACCIDENT (CVA), UNSPECIFIED MECHANISM (HCC): ICD-10-CM

## 2021-06-02 DIAGNOSIS — Z76.89 ENCOUNTER TO ESTABLISH CARE: Primary | ICD-10-CM

## 2021-06-02 DIAGNOSIS — I10 ESSENTIAL HYPERTENSION: ICD-10-CM

## 2021-06-02 PROBLEM — G47.33 OSA (OBSTRUCTIVE SLEEP APNEA): Status: ACTIVE | Noted: 2018-04-01

## 2021-06-02 PROBLEM — E66.9 OBESITY: Status: ACTIVE | Noted: 2018-04-02

## 2021-06-02 PROCEDURE — 82306 VITAMIN D 25 HYDROXY: CPT

## 2021-06-02 PROCEDURE — 99215 OFFICE O/P EST HI 40 MIN: CPT | Performed by: NURSE PRACTITIONER

## 2021-06-02 RX ORDER — PANTOPRAZOLE SODIUM 20 MG/1
20 TABLET, DELAYED RELEASE ORAL DAILY
Qty: 30 TABLET | Refills: 5 | Status: SHIPPED | OUTPATIENT
Start: 2021-06-02 | End: 2021-11-16

## 2021-06-02 NOTE — PROGRESS NOTES
Chief Complaint  Establish Care    Subjective    History of Present Illness {CC  Problem List  Visit  Diagnosis   Encounters  Notes  Medications  Labs  Result Review Imaging  Media :23}     Modesta Kaur presents to [facility] for   Est Care visit - previous PCP Afshan Sahni NP - retired; last PCP visit 10/15/20 with last labs obtained at same visit - CMP with elevated alk phos and A1c 6.3. Pt voices no c/o or concerns today. Review of records indicate hx of HTN, T2DM, Asthma, GERD and CVA with R side paresis - has had physical therapy on several occasions.     Hypertension  This is a chronic problem. The current episode started more than 1 year ago. The problem is unchanged. The problem is controlled. Pertinent negatives include no anxiety, blurred vision, chest pain, headaches, malaise/fatigue, peripheral edema or shortness of breath. There are no associated agents to hypertension. Risk factors for coronary artery disease include diabetes mellitus, dyslipidemia and obesity. Current antihypertension treatment includes central alpha agonists and angiotensin blockers. The current treatment provides moderate improvement. There are no compliance problems.  Hypertensive end-organ damage includes CVA.   Diabetes  She presents for her follow-up diabetic visit. She has type 2 diabetes mellitus. Her disease course has been stable. Pertinent negatives for hypoglycemia include no headaches. Pertinent negatives for diabetes include no blurred vision and no chest pain. Symptoms are stable. Diabetic complications include a CVA. Risk factors for coronary artery disease include post-menopausal, hypertension, obesity, dyslipidemia and diabetes mellitus. Current diabetic treatment includes oral agent (monotherapy). An ACE inhibitor/angiotensin II receptor blocker is being taken.   Heartburn  She complains of heartburn (stable). She reports no chest pain, no tooth decay, no water brash or  "no wheezing. This is a chronic problem. The current episode started more than 1 year ago. The problem has been unchanged. Risk factors include obesity. She has tried a PPI for the symptoms. The treatment provided significant relief.        Objective     Physical Exam  Vitals and nursing note reviewed.   Constitutional:       General: She is not in acute distress.     Appearance: Normal appearance. She is obese. She is not ill-appearing.   HENT:      Head: Normocephalic and atraumatic.   Eyes:      Conjunctiva/sclera: Conjunctivae normal.      Pupils: Pupils are equal, round, and reactive to light.   Cardiovascular:      Rate and Rhythm: Normal rate and regular rhythm.      Heart sounds: Normal heart sounds.   Pulmonary:      Effort: Pulmonary effort is normal.      Breath sounds: Normal breath sounds.   Musculoskeletal:         General: Normal range of motion.      Cervical back: Normal range of motion.      Comments: Right sided upper extremity deficit - decreased  strength and ROM - pt reports she is left hand dominant   Skin:     General: Skin is warm and dry.   Neurological:      General: No focal deficit present.      Mental Status: She is alert and oriented to person, place, and time.   Psychiatric:         Mood and Affect: Mood normal.         Behavior: Behavior normal.        Result Review  Data Reviewed:{ Labs  Result Review  Imaging  Med Tab  Media :23}          Vital Signs:   /86 (BP Location: Left arm, Patient Position: Sitting, Cuff Size: Adult)   Pulse 114   Temp 97.7 °F (36.5 °C) (Temporal)   Resp 20   Ht 152.4 cm (60\")   Wt 81.9 kg (180 lb 9.6 oz)   SpO2 99%   BMI 35.27 kg/m²          Assessment and Plan {CC Problem List  Visit Diagnosis  ROS  Review (Popup)  Health Maintenance  Quality  BestPractice  Medications  SmartSets  SnapShot Encounters  Media :23}   Problem List Items Addressed This Visit        Cardiac and Vasculature    Essential hypertension       Neuro    " Cerebrovascular accident (CMS/[facility])    Relevant Orders    Ambulatory Referral to Physical Therapy Evaluate and treat (Completed)      Other Visit Diagnoses     Encounter to establish care    -  Primary    Gastroesophageal reflux disease without esophagitis        Relevant Medications    pantoprazole (Protonix) 20 MG EC tablet    Hemiparesis of right nondominant side as late effect of other cerebrovascular disease (CMS/[facility])        Relevant Orders    Ambulatory Referral to Physical Therapy Evaluate and treat (Completed)         Diagnosis Plan   1. Encounter to establish care     2. Gastroesophageal reflux disease without esophagitis  pantoprazole (Protonix) 20 MG EC tablet   3. Cerebrovascular accident (CVA), unspecified mechanism (CMS/[facility])  Ambulatory Referral to Physical Therapy Evaluate and treat   4. Hemiparesis of right nondominant side as late effect of other cerebrovascular disease (CMS/[facility])  Ambulatory Referral to Physical Therapy Evaluate and treat   5. Essential hypertension       - Est care visit - last PCP note, labs and previous PT referral reviewed and noted in HPI  - GERD - Stop omeprazole and change to pantoprazole d/t taking Plavix; RX for pantoprazole 20mg submitted  - CVA with R sided hemiparesis - referral to PT - pt requesting [facility] as she has seen them previously.  - HTN - appears controlled - cont amlodipine and losartan at current dosing - no refills needed today.   - Discussed lab results - advised to stop tizanidine for now d/t elevated alk phos - will recheck CMP at f/u appt.    I spent 40 minutes caring for Modesta Kaur on this date of service. This time includes time spent by me in the following activities: preparing for the visit, reviewing tests, obtaining and/or reviewing a separately obtained history, performing a medically appropriate examination and/or evaluation, counseling and educating the patient/family/caregiver, ordering medications, tests, or procedures, documenting  information in the medical record and independently interpreting results and communicating that information with the patient/family/caregiver.     Follow Up {Instructions Charge Capture  Follow-up Communications :23}   Return in about 6 months (around 12/2/2021) for Recheck.  Patient was given instructions and counseling regarding her condition or for health maintenance advice. Please see specific information pulled into the AVS if appropriate            .  This document has been electronically signed by JESUS Hankins on June 19, 2021 23:14 CDT

## 2021-06-03 LAB — 25(OH)D3 SERPL-MCNC: 42.1 NG/ML (ref 30–100)

## 2021-06-12 DIAGNOSIS — E55.9 VITAMIN D DEFICIENCY: Primary | ICD-10-CM

## 2021-06-12 RX ORDER — ERGOCALCIFEROL 1.25 MG/1
50000 CAPSULE ORAL
Qty: 6 CAPSULE | Refills: 1 | Status: SHIPPED | OUTPATIENT
Start: 2021-06-12 | End: 2022-03-14 | Stop reason: SDUPTHER

## 2021-07-19 DIAGNOSIS — I10 ESSENTIAL HYPERTENSION: ICD-10-CM

## 2021-07-19 DIAGNOSIS — I63.9 CEREBROVASCULAR ACCIDENT (CVA), UNSPECIFIED MECHANISM (HCC): ICD-10-CM

## 2021-07-19 DIAGNOSIS — E78.5 HYPERLIPIDEMIA, UNSPECIFIED HYPERLIPIDEMIA TYPE: ICD-10-CM

## 2021-07-22 RX ORDER — LOSARTAN POTASSIUM 100 MG/1
TABLET ORAL
Qty: 30 TABLET | Refills: 5 | Status: SHIPPED | OUTPATIENT
Start: 2021-07-22 | End: 2021-12-02 | Stop reason: SDUPTHER

## 2021-07-22 RX ORDER — ASPIRIN 81 MG/1
TABLET, COATED ORAL
Qty: 30 TABLET | Refills: 11 | Status: SHIPPED | OUTPATIENT
Start: 2021-07-22 | End: 2022-07-14

## 2021-07-22 RX ORDER — CLOPIDOGREL BISULFATE 75 MG/1
TABLET ORAL
Qty: 30 TABLET | Refills: 5 | Status: SHIPPED | OUTPATIENT
Start: 2021-07-22 | End: 2021-12-02 | Stop reason: SDUPTHER

## 2021-07-22 RX ORDER — POTASSIUM CHLORIDE 750 MG/1
TABLET, EXTENDED RELEASE ORAL
Qty: 39 TABLET | Refills: 3 | OUTPATIENT
Start: 2021-07-22

## 2021-07-22 RX ORDER — ATORVASTATIN CALCIUM 40 MG/1
TABLET, FILM COATED ORAL
Qty: 30 TABLET | Refills: 5 | Status: SHIPPED | OUTPATIENT
Start: 2021-07-22 | End: 2021-12-02 | Stop reason: SDUPTHER

## 2021-08-18 DIAGNOSIS — M62.838 MUSCLE SPASM: ICD-10-CM

## 2021-08-18 RX ORDER — TIZANIDINE 4 MG/1
TABLET ORAL
Qty: 90 TABLET | Refills: 3 | Status: SHIPPED | OUTPATIENT
Start: 2021-08-18 | End: 2021-12-02 | Stop reason: ALTCHOICE

## 2021-08-18 NOTE — TELEPHONE ENCOUNTER
Rx Refill Note  Requested Prescriptions     Pending Prescriptions Disp Refills   • tiZANidine (ZANAFLEX) 4 MG tablet [Pharmacy Med Name: tizanidine 4 mg tablet] 90 tablet 3     Sig: TAKE ONE TABLET BY MOUTH EVERY 8 HOURS AS NEEDED FOR MUSCLE SPASMS      TIP  Is Refill Pharmacy correct?  Yes    }Mauricio Morin LPN  08/18/21, 09:05 CDT

## 2021-10-17 DIAGNOSIS — D64.9 ANEMIA, UNSPECIFIED TYPE: ICD-10-CM

## 2021-10-17 DIAGNOSIS — E11.9 TYPE 2 DIABETES MELLITUS WITHOUT COMPLICATION, WITHOUT LONG-TERM CURRENT USE OF INSULIN (HCC): ICD-10-CM

## 2021-10-17 DIAGNOSIS — I10 ESSENTIAL HYPERTENSION: ICD-10-CM

## 2021-10-18 RX ORDER — FERROUS SULFATE 325(65) MG
TABLET ORAL
Qty: 60 TABLET | Refills: 5 | Status: SHIPPED | OUTPATIENT
Start: 2021-10-18 | End: 2022-04-15

## 2021-10-18 RX ORDER — AMLODIPINE BESYLATE 10 MG/1
TABLET ORAL
Qty: 30 TABLET | Refills: 5 | Status: SHIPPED | OUTPATIENT
Start: 2021-10-18 | End: 2022-03-14 | Stop reason: SDUPTHER

## 2021-11-16 DIAGNOSIS — K21.9 GASTROESOPHAGEAL REFLUX DISEASE WITHOUT ESOPHAGITIS: ICD-10-CM

## 2021-11-16 RX ORDER — PANTOPRAZOLE SODIUM 20 MG/1
TABLET, DELAYED RELEASE ORAL
Qty: 30 TABLET | Refills: 0 | Status: SHIPPED | OUTPATIENT
Start: 2021-11-16 | End: 2021-12-02 | Stop reason: SDUPTHER

## 2021-12-01 ENCOUNTER — TELEPHONE (OUTPATIENT)
Dept: FAMILY MEDICINE CLINIC | Facility: CLINIC | Age: 56
End: 2021-12-01

## 2021-12-02 ENCOUNTER — OFFICE VISIT (OUTPATIENT)
Dept: FAMILY MEDICINE CLINIC | Facility: CLINIC | Age: 56
End: 2021-12-02

## 2021-12-02 ENCOUNTER — LAB (OUTPATIENT)
Dept: LAB | Facility: HOSPITAL | Age: 56
End: 2021-12-02

## 2021-12-02 VITALS
BODY MASS INDEX: 35.73 KG/M2 | OXYGEN SATURATION: 97 % | WEIGHT: 182 LBS | TEMPERATURE: 97.4 F | DIASTOLIC BLOOD PRESSURE: 90 MMHG | RESPIRATION RATE: 20 BRPM | HEIGHT: 60 IN | HEART RATE: 103 BPM | SYSTOLIC BLOOD PRESSURE: 126 MMHG

## 2021-12-02 DIAGNOSIS — K21.9 GASTROESOPHAGEAL REFLUX DISEASE WITHOUT ESOPHAGITIS: ICD-10-CM

## 2021-12-02 DIAGNOSIS — I10 ESSENTIAL HYPERTENSION: Primary | ICD-10-CM

## 2021-12-02 DIAGNOSIS — I63.9 CEREBROVASCULAR ACCIDENT (CVA), UNSPECIFIED MECHANISM (HCC): ICD-10-CM

## 2021-12-02 DIAGNOSIS — Z12.4 CERVICAL CANCER SCREENING: ICD-10-CM

## 2021-12-02 DIAGNOSIS — E78.5 HYPERLIPIDEMIA, UNSPECIFIED HYPERLIPIDEMIA TYPE: ICD-10-CM

## 2021-12-02 DIAGNOSIS — I10 ESSENTIAL HYPERTENSION: ICD-10-CM

## 2021-12-02 DIAGNOSIS — E11.9 TYPE 2 DIABETES MELLITUS WITHOUT COMPLICATION, WITHOUT LONG-TERM CURRENT USE OF INSULIN (HCC): ICD-10-CM

## 2021-12-02 DIAGNOSIS — M62.838 MUSCLE SPASM: ICD-10-CM

## 2021-12-02 DIAGNOSIS — Z86.73 HISTORY OF CVA (CEREBROVASCULAR ACCIDENT): ICD-10-CM

## 2021-12-02 PROCEDURE — 99214 OFFICE O/P EST MOD 30 MIN: CPT | Performed by: NURSE PRACTITIONER

## 2021-12-02 PROCEDURE — 80053 COMPREHEN METABOLIC PANEL: CPT

## 2021-12-02 PROCEDURE — 83036 HEMOGLOBIN GLYCOSYLATED A1C: CPT

## 2021-12-02 RX ORDER — LOSARTAN POTASSIUM 100 MG/1
100 TABLET ORAL DAILY
Qty: 30 TABLET | Refills: 5 | Status: SHIPPED | OUTPATIENT
Start: 2021-12-02 | End: 2022-07-14

## 2021-12-02 RX ORDER — BACLOFEN 5 MG/1
5 TABLET ORAL 3 TIMES DAILY
Qty: 90 TABLET | Refills: 5 | Status: SHIPPED | OUTPATIENT
Start: 2021-12-02 | End: 2022-06-14

## 2021-12-02 RX ORDER — ATORVASTATIN CALCIUM 40 MG/1
40 TABLET, FILM COATED ORAL
Qty: 30 TABLET | Refills: 5 | Status: SHIPPED | OUTPATIENT
Start: 2021-12-02 | End: 2022-09-28

## 2021-12-02 RX ORDER — PANTOPRAZOLE SODIUM 20 MG/1
20 TABLET, DELAYED RELEASE ORAL DAILY
Qty: 30 TABLET | Refills: 5 | Status: SHIPPED | OUTPATIENT
Start: 2021-12-02 | End: 2022-06-14

## 2021-12-02 RX ORDER — CLOPIDOGREL BISULFATE 75 MG/1
75 TABLET ORAL DAILY
Qty: 30 TABLET | Refills: 5 | Status: SHIPPED | OUTPATIENT
Start: 2021-12-02 | End: 2022-07-14

## 2021-12-02 NOTE — PROGRESS NOTES
Chief Complaint  Hypertension and Heartburn    Subjective    History of Present Illness {CC  Problem List  Visit  Diagnosis   Encounters  Notes  Medications  Labs  Result Review Imaging  Media :23}     Modesta Kaur presents to [facility] [place] for   6 mos f/u on HTN, DM and GERD - reports BP at home runs 120-130/80s - denies HA, blurry vision, CP or edema. Does not check BS regularly. Reports GERD is well controlled on medication. Presents hand written note with Baclofen and cyclobenzaprine written on it - pt states her husbands doctor thinks pt should be taking one of these medications to help loosen muscles of right arm/hand - pt post CVA with right sided residual weakness - just completed PT and still performs home stretches but right hand is contracted - pts states that her  says when she is sleeping her hand and arm do not appear to be contracted as they are when pt is awake. Informed pt that she was taking tizanidine which was muscle relaxer similar to meds requested - but she has not been taking this d/t her rising alk phos levels. Pt reports she recently received her Moderna booster on 12/1/21.        Objective     Physical Exam  Vitals and nursing note reviewed.   Constitutional:       General: She is not in acute distress.     Appearance: Normal appearance. She is obese. She is not ill-appearing.      Comments: In WC today with cane present   HENT:      Head: Normocephalic and atraumatic.   Eyes:      Conjunctiva/sclera: Conjunctivae normal.      Pupils: Pupils are equal, round, and reactive to light.   Cardiovascular:      Rate and Rhythm: Normal rate and regular rhythm.      Heart sounds: Normal heart sounds.   Pulmonary:      Effort: Pulmonary effort is normal.      Breath sounds: Normal breath sounds.   Abdominal:      Tenderness: There is no abdominal tenderness.   Musculoskeletal:         General: Normal range of motion.       "Cervical back: Normal range of motion.      Comments: Right sided upper extremity deficit - decreased  strength and ROM - pt reports she is left hand dominant - right hand stays in contracted position - used cane for ambulation   Skin:     General: Skin is warm and dry.   Neurological:      General: No focal deficit present.      Mental Status: She is alert and oriented to person, place, and time.   Psychiatric:         Mood and Affect: Mood normal.         Behavior: Behavior normal.        Result Review  Data Reviewed:{ Labs  Result Review  Imaging  Med Tab  Media :23}   The following data was reviewed by (Optional):[id number]}  Common labs    Common Labsle 4/30/21 4/30/21 4/30/21 4/30/21 4/30/21 12/2/21 12/2/21    1344 1344 1344 1344 1344 1444 1444   Glucose     74 116 (A)    BUN     15 14    Creatinine     0.72 0.88    eGFR  Am     102 80    Sodium     138 141    Potassium     3.9 4.3    Chloride     102 106    Calcium     9.8 9.8    Albumin     4.70 4.40    Total Bilirubin     0.7 0.7    Alkaline Phosphatase     182 (A) 195 (A)    AST (SGOT)     15 27    ALT (SGPT)     20 37 (A)    WBC 10.72         Hemoglobin 14.1         Hematocrit 42.8         Platelets 339         Total Cholesterol    172      Triglycerides    193 (A)      HDL Cholesterol    43      LDL Cholesterol     96      Hemoglobin A1C  6.55 (A)     6.84 (A)   Microalbumin, Urine   2.0       (A) Abnormal value          No Images in the past 120 days found..       Vital Signs:   /90 (BP Location: Left arm, Patient Position: Sitting, Cuff Size: Adult)   Pulse 103   Temp 97.4 °F (36.3 °C) (Temporal)   Resp 20   Ht 152.4 cm (60\")   Wt 82.6 kg (182 lb)   SpO2 97%   BMI 35.54 kg/m²          Assessment and Plan {CC Problem List  Visit Diagnosis  ROS  Review (Popup)  Health Maintenance  Quality  BestPractice  Medications  SmartSets  SnapShot Encounters  Media :23}   Problem List Items Addressed This Visit        Cardiac and " Vasculature    Essential hypertension - Primary    Relevant Medications    losartan (COZAAR) 100 MG tablet    Other Relevant Orders    Comprehensive metabolic panel (Completed)    Hyperlipidemia    Relevant Medications    atorvastatin (LIPITOR) 40 MG tablet       Endocrine and Metabolic    Type 2 diabetes mellitus without complication, without long-term current use of insulin (HCC)    Relevant Orders    Hemoglobin A1c (Completed)    Ambulatory Referral to Podiatry       Gastrointestinal Abdominal     Gastroesophageal reflux disease without esophagitis    Relevant Medications    pantoprazole (PROTONIX) 20 MG EC tablet       Neuro    History of CVA (cerebrovascular accident)    Relevant Medications    clopidogrel (PLAVIX) 75 MG tablet      Other Visit Diagnoses     Muscle spasm        Relevant Medications    Baclofen 5 MG tablet    Cervical cancer screening        Relevant Orders    Ambulatory Referral to Gynecology         Diagnosis Plan   1. Essential hypertension  Comprehensive metabolic panel    losartan (COZAAR) 100 MG tablet   2. Type 2 diabetes mellitus without complication, without long-term current use of insulin (HCC)  Hemoglobin A1c    Ambulatory Referral to Podiatry   3. Gastroesophageal reflux disease without esophagitis  pantoprazole (PROTONIX) 20 MG EC tablet   4. Hyperlipidemia, unspecified hyperlipidemia type  atorvastatin (LIPITOR) 40 MG tablet   5. Cerebrovascular accident (CVA), unspecified mechanism (HCC)  clopidogrel (PLAVIX) 75 MG tablet   6. Muscle spasm  Baclofen 5 MG tablet   7. Cervical cancer screening  Ambulatory Referral to Gynecology   8. History of CVA (cerebrovascular accident)       - HTN - controlled - CMP ordered. Refill of losartan 100mg daily submitted - cont current dose; cont amlodipine 10mg daily - no refill needed.   - T2DM - A1c ordered - continue metformin 500mg BID - no refill needed - cont low fat, low sugar diet   - GERD - controlled on medication - refill of pantoprazole  20 mg submitted - cont current dosing; advise to avoid trigger foods  - Hyperlipidemia - refill of atorvastatin 40mg submitted - cont current dosing - cont low fat diet  - CVA, History of - clopidogrel 75mg daily refilled - cont current dosing  - Muscle spasm/spasticity - stop tizanidine - start baclofen 5mg TID - RX submitted    Follow Up {Instructions Charge Capture  Follow-up Communications :23}   Return for Medicare Wellness.  Patient was given instructions and counseling regarding her condition or for health maintenance advice. Please see specific information pulled into the AVS if appropriate            .  This document has been electronically signed by JESUS Hankins on December 4, 2021 15:30 CST

## 2021-12-03 LAB
ALBUMIN SERPL-MCNC: 4.4 G/DL (ref 3.5–5.2)
ALBUMIN/GLOB SERPL: 1.2 G/DL
ALP SERPL-CCNC: 195 U/L (ref 39–117)
ALT SERPL W P-5'-P-CCNC: 37 U/L (ref 1–33)
ANION GAP SERPL CALCULATED.3IONS-SCNC: 10.5 MMOL/L (ref 5–15)
AST SERPL-CCNC: 27 U/L (ref 1–32)
BILIRUB SERPL-MCNC: 0.7 MG/DL (ref 0–1.2)
BUN SERPL-MCNC: 14 MG/DL (ref 6–20)
BUN/CREAT SERPL: 15.9 (ref 7–25)
CALCIUM SPEC-SCNC: 9.8 MG/DL (ref 8.6–10.5)
CHLORIDE SERPL-SCNC: 106 MMOL/L (ref 98–107)
CO2 SERPL-SCNC: 24.5 MMOL/L (ref 22–29)
CREAT SERPL-MCNC: 0.88 MG/DL (ref 0.57–1)
GFR SERPL CREATININE-BSD FRML MDRD: 80 ML/MIN/1.73
GLOBULIN UR ELPH-MCNC: 3.6 GM/DL
GLUCOSE SERPL-MCNC: 116 MG/DL (ref 65–99)
HBA1C MFR BLD: 6.84 % (ref 4.8–5.6)
POTASSIUM SERPL-SCNC: 4.3 MMOL/L (ref 3.5–5.2)
PROT SERPL-MCNC: 8 G/DL (ref 6–8.5)
SODIUM SERPL-SCNC: 141 MMOL/L (ref 136–145)

## 2021-12-04 PROBLEM — K21.9 GASTROESOPHAGEAL REFLUX DISEASE WITHOUT ESOPHAGITIS: Status: ACTIVE | Noted: 2021-12-04

## 2021-12-04 PROBLEM — Z86.73 HISTORY OF CVA (CEREBROVASCULAR ACCIDENT): Status: ACTIVE | Noted: 2018-09-06

## 2021-12-04 PROBLEM — E78.5 HYPERLIPIDEMIA: Status: ACTIVE | Noted: 2021-12-04

## 2021-12-15 ENCOUNTER — TELEPHONE (OUTPATIENT)
Dept: FAMILY MEDICINE CLINIC | Facility: CLINIC | Age: 56
End: 2021-12-15

## 2021-12-16 DIAGNOSIS — M62.838 MUSCLE SPASM: ICD-10-CM

## 2021-12-18 RX ORDER — TIZANIDINE 4 MG/1
TABLET ORAL
Qty: 90 TABLET | Refills: 3 | OUTPATIENT
Start: 2021-12-18

## 2021-12-23 DIAGNOSIS — R74.8 ELEVATED ALKALINE PHOSPHATASE LEVEL: Primary | ICD-10-CM

## 2021-12-27 ENCOUNTER — TELEPHONE (OUTPATIENT)
Dept: FAMILY MEDICINE CLINIC | Facility: CLINIC | Age: 56
End: 2021-12-27

## 2021-12-30 ENCOUNTER — OFFICE VISIT (OUTPATIENT)
Dept: FAMILY MEDICINE CLINIC | Facility: CLINIC | Age: 56
End: 2021-12-30

## 2021-12-30 VITALS
OXYGEN SATURATION: 98 % | SYSTOLIC BLOOD PRESSURE: 128 MMHG | DIASTOLIC BLOOD PRESSURE: 88 MMHG | TEMPERATURE: 97.2 F | WEIGHT: 178 LBS | HEIGHT: 60 IN | BODY MASS INDEX: 34.95 KG/M2 | RESPIRATION RATE: 20 BRPM | HEART RATE: 106 BPM

## 2021-12-30 DIAGNOSIS — Z00.00 MEDICARE ANNUAL WELLNESS VISIT, SUBSEQUENT: Primary | ICD-10-CM

## 2021-12-30 DIAGNOSIS — E66.9 OBESITY (BMI 30.0-34.9): ICD-10-CM

## 2021-12-30 PROCEDURE — 1170F FXNL STATUS ASSESSED: CPT | Performed by: NURSE PRACTITIONER

## 2021-12-30 PROCEDURE — 1126F AMNT PAIN NOTED NONE PRSNT: CPT | Performed by: NURSE PRACTITIONER

## 2021-12-30 PROCEDURE — 1159F MED LIST DOCD IN RCRD: CPT | Performed by: NURSE PRACTITIONER

## 2021-12-30 PROCEDURE — G0439 PPPS, SUBSEQ VISIT: HCPCS | Performed by: NURSE PRACTITIONER

## 2021-12-30 NOTE — PATIENT INSTRUCTIONS
Medicare Wellness  Personal Prevention Plan of Service     Date of Office Visit:  2021  Encounter Provider:  JESUS Hankins  Place of Service:  [facility]  Patient Name: Modesta Kaur  :  1965    As part of the Medicare Wellness portion of your visit today, we are providing you with this personalized preventive plan of services (PPPS). This plan is based upon recommendations of the [place] Preventive Services Task Force (USPSTF) and the Advisory Committee on Immunization Practices (ACIP).    This lists the preventive care services that should be considered, and provides dates of when you are due. Items listed as completed are up-to-date and do not require any further intervention.    Health Maintenance   Topic Date Due   • COVID-19 Vaccine (1) Never done   • PAP SMEAR  2020   • DIABETIC FOOT EXAM  2021   • INFLUENZA VACCINE  2022 (Originally 2021)   • Hepatitis B (1 of 3 - Risk 3-dose series) 2022 (Originally 1983)   • Pneumococcal Vaccine 0-64 (1 of 2 - PPSV23) 2022 (Originally 1970)   • MAMMOGRAM  2022 (Originally 12/3/2021)   • DIABETIC EYE EXAM  2022 (Originally 2021)   • DXA SCAN  2022 (Originally 1965)   • ZOSTER VACCINE (1 of 2) 2022 (Originally 2014)   • LIPID PANEL  2022   • URINE MICROALBUMIN  2022   • HEMOGLOBIN A1C  2022   • ANNUAL WELLNESS VISIT  2022   • TDAP/TD VACCINES (2 - Td or Tdap) 2027   • COLORECTAL CANCER SCREENING  2027   • HEPATITIS C SCREENING  Completed       No orders of the defined types were placed in this encounter.      Return in about 1 year (around 2022) for Medicare Wellness.      Healthy Eating  Following a healthy eating pattern may help you to achieve and maintain a healthy body weight, reduce the risk of chronic disease, and live a long and productive life. It is  "important to follow a healthy eating pattern at an appropriate calorie level for your body. Your nutritional needs should be met primarily through food by choosing a variety of nutrient-rich foods.  What are tips for following this plan?  Reading food labels  · Read labels and choose the following:  ? Reduced or low sodium.  ? Juices with 100% fruit juice.  ? Foods with low saturated fats and high polyunsaturated and monounsaturated fats.  ? Foods with whole grains, such as whole wheat, cracked wheat, brown rice, and wild rice.  ? Whole grains that are fortified with folic acid. This is recommended for women who are pregnant or who want to become pregnant.  · Read labels and avoid the following:  ? Foods with a lot of added sugars. These include foods that contain brown sugar, corn sweetener, corn syrup, dextrose, fructose, glucose, high-fructose corn syrup, honey, invert sugar, lactose, malt syrup, maltose, molasses, raw sugar, sucrose, trehalose, or turbinado sugar.  § Do not eat more than the following amounts of added sugar per day:  § 6 teaspoons (25 g) for women.  § 9 teaspoons (38 g) for men.  ? Foods that contain processed or refined starches and grains.  ? Refined grain products, such as white flour, degermed cornmeal, white bread, and white rice.  Shopping  · Choose nutrient-rich snacks, such as vegetables, whole fruits, and nuts. Avoid high-calorie and high-sugar snacks, such as potato chips, fruit snacks, and candy.  · Use oil-based dressings and spreads on foods instead of solid fats such as butter, stick margarine, or cream cheese.  · Limit pre-made sauces, mixes, and \"instant\" products such as flavored rice, instant noodles, and ready-made pasta.  · Try more plant-protein sources, such as tofu, tempeh, black beans, edamame, lentils, nuts, and seeds.  · Explore eating plans such as the Mediterranean diet or vegetarian diet.  Cooking  · Use oil to sauté or stir-portillo foods instead of solid fats such as " butter, stick margarine, or lard.  · Try baking, boiling, grilling, or broiling instead of frying.  · Remove the fatty part of meats before cooking.  · Steam vegetables in water or broth.  Meal planning    · At meals, imagine dividing your plate into fourths:  ? One-half of your plate is fruits and vegetables.  ? One-fourth of your plate is whole grains.  ? One-fourth of your plate is protein, especially lean meats, poultry, eggs, tofu, beans, or nuts.  · Include low-fat dairy as part of your daily diet.    Lifestyle  · Choose healthy options in all settings, including home, work, school, restaurants, or stores.  · Prepare your food safely:  ? Wash your hands after handling raw meats.  ? Keep food preparation surfaces clean by regularly washing with hot, soapy water.  ? Keep raw meats separate from ready-to-eat foods, such as fruits and vegetables.  ? , meat, poultry, and eggs to the recommended internal temperature.  ? Store foods at safe temperatures. In general:  § Keep cold foods at 40°F (4.4°C) or below.  § Keep hot foods at 140°F (60°C) or above.  § Keep your freezer at 0°F (-17.8°C) or below.  § Foods are no longer safe to eat when they have been between the temperatures of 40°-140°F (4.4-60°C) for more than 2 hours.  What foods should I eat?  Fruits  Aim to eat 2 cup-equivalents of fresh, canned (in natural juice), or frozen fruits each day. Examples of 1 cup-equivalent of fruit include 1 small apple, 8 large strawberries, 1 cup canned fruit, ½ cup dried fruit, or 1 cup 100% juice.  Vegetables  Aim to eat 2½-3 cup-equivalents of fresh and frozen vegetables each day, including different varieties and colors. Examples of 1 cup-equivalent of vegetables include 2 medium carrots, 2 cups raw, leafy greens, 1 cup chopped vegetable (raw or cooked), or 1 medium baked potato.  Grains  Aim to eat 6 ounce-equivalents of whole grains each day. Examples of 1 ounce-equivalent of grains include 1 slice of bread,  1 cup ready-to-eat cereal, 3 cups popcorn, or ½ cup cooked rice, pasta, or cereal.  Meats and other proteins  Aim to eat 5-6 ounce-equivalents of protein each day. Examples of 1 ounce-equivalent of protein include 1 egg, 1/2 cup nuts or seeds, or 1 tablespoon (16 g) peanut butter. A cut of meat or fish that is the size of a deck of cards is about 3-4 ounce-equivalents.  · Of the protein you eat each week, try to have at least 8 ounces come from seafood. This includes salmon, trout, herring, and anchovies.  Dairy  Aim to eat 3 cup-equivalents of fat-free or low-fat dairy each day. Examples of 1 cup-equivalent of dairy include 1 cup (240 mL) milk, 8 ounces (250 g) yogurt, 1½ ounces (44 g) natural cheese, or 1 cup (240 mL) fortified soy milk.  Fats and oils  · Aim for about 5 teaspoons (21 g) per day. Choose monounsaturated fats, such as canola and olive oils, avocados, peanut butter, and most nuts, or polyunsaturated fats, such as sunflower, corn, and soybean oils, walnuts, pine nuts, sesame seeds, sunflower seeds, and flaxseed.  Beverages  · Aim for six 8-oz glasses of water per day. Limit coffee to three to five 8-oz cups per day.  · Limit caffeinated beverages that have added calories, such as soda and energy drinks.  · Limit alcohol intake to no more than 1 drink a day for nonpregnant women and 2 drinks a day for men. One drink equals 12 oz of beer (355 mL), 5 oz of wine (148 mL), or 1½ oz of hard liquor (44 mL).  Seasoning and other foods  · Avoid adding excess amounts of salt to your foods. Try flavoring foods with herbs and spices instead of salt.  · Avoid adding sugar to foods.  · Try using oil-based dressings, sauces, and spreads instead of solid fats.  This information is based on general [place] nutrition guidelines. For more information, visit choosemyplate.gov. Exact amounts may vary based on your nutrition needs.  Summary  · A healthy eating plan may help you to maintain a healthy weight, reduce the risk of  chronic diseases, and stay active throughout your life.  · Plan your meals. Make sure you eat the right portions of a variety of nutrient-rich foods.  · Try baking, boiling, grilling, or broiling instead of frying.  · Choose healthy options in all settings, including home, work, school, restaurants, or stores.  This information is not intended to replace advice given to you by your health care provider. Make sure you discuss any questions you have with your health care provider.  Document Revised: 04/01/2019 Document Reviewed: 04/01/2019  Elsevier Patient Education © 2021 [employer]

## 2021-12-30 NOTE — PROGRESS NOTES
The ABCs of the Annual Wellness Visit  Subsequent Medicare Wellness Visit    Chief Complaint   Patient presents with   • Annual Exam     Medicare wellness      Subjective    History of Present Illness:  Modesta Kaur is a 56 y.o. female who presents for a Subsequent Medicare Wellness Visit.    The following portions of the patient's history were reviewed and   updated as appropriate: allergies, current medications, past family history, past medical history, past social history, past surgical history and problem list.    Compared to one year ago, the patient feels her physical   health is the same.    Compared to one year ago, the patient feels her mental   health is the same.    Recent Hospitalizations:  She was not admitted to the hospital during the last year.       Current Medical Providers:  Patient Care Team:  Mandi Sterling APRN as PCP - General (Family Medicine)    Outpatient Medications Prior to Visit   Medication Sig Dispense Refill   • amLODIPine (NORVASC) 10 MG tablet TAKE ONE TABLET BY MOUTH EVERY MORNING 30 tablet 5   • atorvastatin (LIPITOR) 40 MG tablet Take 1 tablet by mouth every night at bedtime. 30 tablet 5   • Baclofen 5 MG tablet Take 5 mg by mouth 3 (Three) Times a Day. 90 tablet 5   • Blood Glucose Monitoring Suppl w/Device kit Check blood sugar once daily for hyperglycemia 1 each 0   • clopidogrel (PLAVIX) 75 MG tablet Take 1 tablet by mouth Daily. 30 tablet 5   • FeroSul 325 (65 Fe) MG tablet TAKE ONE TABLET BY MOUTH TWICE DAILY 60 tablet 5   • glucose blood test strip Check blood sugar once daily for hyperglycemia 50 each 12   • GNP Aspirin 81 MG EC tablet TAKE ONE TABLET BY MOUTH EVERY DAY 30 tablet 11   • Lancets 30G misc 1 each Daily. 100 each 12   • losartan (COZAAR) 100 MG tablet Take 1 tablet by mouth Daily. 30 tablet 5   • metFORMIN (GLUCOPHAGE) 500 MG tablet TAKE ONE TABLET BY MOUTH TWICE DAILY WITH meals 60 tablet 5   • pantoprazole (PROTONIX) 20 MG EC tablet Take 1 tablet by  "mouth Daily. 30 tablet 5   • vitamin D (ERGOCALCIFEROL) 1.25 MG (35990 UT) capsule capsule Take 1 capsule by mouth Every 30 (Thirty) Days. 6 capsule 1     No facility-administered medications prior to visit.       No opioid medication identified on active medication list. I have reviewed chart for other potential  high risk medication/s and harmful drug interactions in the elderly.          Aspirin is on active medication list. Aspirin use is indicated based on review of current medical condition/s. Pros and cons of this therapy have been discussed today. Benefits of this medication outweigh potential harm.  Patient has been encouraged to continue taking this medication.  .      Patient Active Problem List   Diagnosis   • Type 2 diabetes mellitus without complication, without long-term current use of insulin (HCC)   • Mild intermittent asthma without complication   • Essential hypertension   • Seasonal allergic rhinitis due to pollen   • History of CVA (cerebrovascular accident)   • SHEYLA (obstructive sleep apnea)   • Obesity (BMI 30.0-34.9)   • Right hemiparesis (HCC)   • Gastroesophageal reflux disease without esophagitis   • Hyperlipidemia     Advance Care Planning  Advance Directive is not on file.  ACP discussion was held with the patient during this visit. Patient does not have an advance directive, information provided.          Objective    Vitals:    12/30/21 1102   BP: 128/88   BP Location: Left arm   Patient Position: Sitting   Cuff Size: Adult   Pulse: 106   Resp: 20   Temp: 97.2 °F (36.2 °C)   TempSrc: Temporal   SpO2: 98%   Weight: 80.7 kg (178 lb)   Height: 152.4 cm (60\")   PainSc: 0-No pain     BMI Readings from Last 1 Encounters:   12/30/21 34.76 kg/m²   BMI is above normal parameters. Recommendations include: educational material, exercise counseling and nutrition counseling    Does the patient have evidence of cognitive impairment? Yes, Mini Cog 2/3 words remembered    Physical Exam  Lab Results "   Component Value Date    HGBA1C 6.84 (H) 12/02/2021            HEALTH RISK ASSESSMENT    Smoking Status:  Social History     Tobacco Use   Smoking Status Never Smoker   Smokeless Tobacco Never Used     Alcohol Consumption:  Social History     Substance and Sexual Activity   Alcohol Use No     Fall Risk Screen:    MARC Fall Risk Assessment has not been completed.    Depression Screening:  PHQ-2/PHQ-9 Depression Screening 12/30/2021   Little interest or pleasure in doing things 0   Feeling down, depressed, or hopeless 0   Total Score 0       Health Habits and Functional and Cognitive Screening:  Functional & Cognitive Status 12/30/2021   Do you have difficulty preparing food and eating? No   Do you have difficulty bathing yourself, getting dressed or grooming yourself? No   Do you have difficulty using the toilet? No   Do you have difficulty moving around from place to place? Yes   Do you have trouble with steps or getting out of a bed or a chair? Yes   Current Diet Other   Dental Exam Not up to date   Eye Exam Not up to date   Exercise (times per week) 0 times per week   Current Exercises Include No Regular Exercise   Do you need help using the phone?  No   Are you deaf or do you have serious difficulty hearing?  No   Do you need help with transportation? Yes   Do you need help shopping? Yes   Do you need help preparing meals?  No   Do you need help with housework?  Yes   Do you need help with laundry? Yes   Do you need help taking your medications? No   Do you need help managing money? No   Do you ever drive or ride in a car without wearing a seat belt? No   Have you felt unusual stress, anger or loneliness in the last month? No   Who do you live with? Child   If you need help, do you have trouble finding someone available to you? No   Have you been bothered in the last four weeks by sexual problems? No   Do you have difficulty concentrating, remembering or making decisions? No       Age-appropriate Screening  Schedule:  Refer to the list below for future screening recommendations based on patient's age, sex and/or medical conditions. Orders for these recommended tests are listed in the plan section. The patient has been provided with a written plan.    Health Maintenance   Topic Date Due   • PAP SMEAR  06/02/2020   • DIABETIC FOOT EXAM  07/17/2021   • INFLUENZA VACCINE  03/31/2022 (Originally 8/1/2021)   • MAMMOGRAM  12/30/2022 (Originally 12/3/2021)   • DIABETIC EYE EXAM  12/30/2022 (Originally 7/13/2021)   • DXA SCAN  12/30/2022 (Originally 1965)   • ZOSTER VACCINE (1 of 2) 12/30/2022 (Originally 11/29/2014)   • LIPID PANEL  04/30/2022   • URINE MICROALBUMIN  04/30/2022   • HEMOGLOBIN A1C  06/02/2022   • TDAP/TD VACCINES (2 - Td or Tdap) 06/02/2027              Assessment/Plan   CMS Preventative Services Quick Reference  Risk Factors Identified During Encounter  Cardiovascular Disease  Dementia/Memory   Fall Risk-High or Moderate  Immunizations Discussed/Encouraged (specific Immunizations; Hepatitis B Vaccine/Series, Influenza, Pneumococcal 23 and Shingrix  Inactivity/Sedentary  Obesity/Overweight   Polypharmacy  The above risks/problems have been discussed with the patient.  Follow up actions/plans if indicated are seen below in the Assessment/Plan Section.  Pertinent information has been shared with the patient in the After Visit Summary.    Diagnoses and all orders for this visit:    1. Medicare annual wellness visit, subsequent (Primary)    2. Obesity (BMI 30.0-34.9)        Follow Up:   Return in about 1 year (around 12/30/2022) for Medicare Wellness.     An After Visit Summary and PPPS were made available to the patient.                   This document has been electronically signed by JESUS Hankins on December 30, 2021 11:25 CST

## 2022-01-18 ENCOUNTER — OFFICE VISIT (OUTPATIENT)
Dept: PODIATRY | Facility: CLINIC | Age: 57
End: 2022-01-18

## 2022-01-18 ENCOUNTER — TELEPHONE (OUTPATIENT)
Dept: FAMILY MEDICINE CLINIC | Facility: CLINIC | Age: 57
End: 2022-01-18

## 2022-01-18 VITALS — HEIGHT: 60 IN | OXYGEN SATURATION: 98 % | BODY MASS INDEX: 34.95 KG/M2 | WEIGHT: 178 LBS | HEART RATE: 114 BPM

## 2022-01-18 DIAGNOSIS — G81.91 RIGHT HEMIPARESIS: ICD-10-CM

## 2022-01-18 DIAGNOSIS — E11.9 TYPE 2 DIABETES MELLITUS WITHOUT COMPLICATION, WITHOUT LONG-TERM CURRENT USE OF INSULIN: ICD-10-CM

## 2022-01-18 DIAGNOSIS — Z86.73 HISTORY OF CVA (CEREBROVASCULAR ACCIDENT): ICD-10-CM

## 2022-01-18 DIAGNOSIS — E11.9 ENCOUNTER FOR DIABETIC FOOT EXAM: Primary | ICD-10-CM

## 2022-01-18 PROCEDURE — 99203 OFFICE O/P NEW LOW 30 MIN: CPT | Performed by: PODIATRIST

## 2022-01-18 NOTE — PROGRESS NOTES
Modesta Kaur  1965  56 y.o. female  PCP: Mandi Sterling 12/30/21  BS: 93     Diabetic foot care    01/18/2022    Chief Complaint   Patient presents with   • Left Foot - diabetic foot care   • Right Foot - diabetic foot care       History of Present Illness    Modesta Kaur is a 56 y.o.female who presents to clinic today as a referral for diabetic foot exam.  She currently does not have any pedal complaints.      Past Medical History:   Diagnosis Date   • Cellulitis of other sites - right foot, resolved    • Cerebrovascular accident (HCC)    • Essential hypertension    • Other specified local infections of the skin and subcutaneous tissue - right foot.    • Rash and other nonspecific skin eruption    • Right hemiparesis (HCC)    • Streptococcal sore throat    • URI (upper respiratory infection)          History reviewed. No pertinent surgical history.      Family History   Problem Relation Age of Onset   • Diabetes Other        Allergies   Allergen Reactions   • Lisinopril    • Triamterene        Social History     Socioeconomic History   • Marital status:    Tobacco Use   • Smoking status: Never Smoker   • Smokeless tobacco: Never Used   Substance and Sexual Activity   • Alcohol use: No   • Drug use: Not Currently   • Sexual activity: Yes         Current Outpatient Medications   Medication Sig Dispense Refill   • amLODIPine (NORVASC) 10 MG tablet TAKE ONE TABLET BY MOUTH EVERY MORNING 30 tablet 5   • atorvastatin (LIPITOR) 40 MG tablet Take 1 tablet by mouth every night at bedtime. 30 tablet 5   • Baclofen 5 MG tablet Take 5 mg by mouth 3 (Three) Times a Day. 90 tablet 5   • Blood Glucose Monitoring Suppl w/Device kit Check blood sugar once daily for hyperglycemia 1 each 0   • clopidogrel (PLAVIX) 75 MG tablet Take 1 tablet by mouth Daily. 30 tablet 5   • FeroSul 325 (65 Fe) MG tablet TAKE ONE TABLET BY MOUTH TWICE DAILY 60 tablet 5   • glucose blood test strip Check blood sugar once daily for  "hyperglycemia 50 each 12   • GNP Aspirin 81 MG EC tablet TAKE ONE TABLET BY MOUTH EVERY DAY 30 tablet 11   • Lancets 30G misc 1 each Daily. 100 each 12   • losartan (COZAAR) 100 MG tablet Take 1 tablet by mouth Daily. 30 tablet 5   • metFORMIN (GLUCOPHAGE) 500 MG tablet TAKE ONE TABLET BY MOUTH TWICE DAILY WITH meals 60 tablet 5   • pantoprazole (PROTONIX) 20 MG EC tablet Take 1 tablet by mouth Daily. 30 tablet 5   • vitamin D (ERGOCALCIFEROL) 1.25 MG (57656 UT) capsule capsule Take 1 capsule by mouth Every 30 (Thirty) Days. 6 capsule 1     No current facility-administered medications for this visit.       Review of Systems   Constitutional: Negative.    HENT: Negative.    Eyes: Negative.    Respiratory: Negative.    Cardiovascular: Negative.    Gastrointestinal: Negative.    Endocrine: Negative.    Genitourinary: Negative.    Musculoskeletal: Negative.    Skin: Negative.    Allergic/Immunologic: Negative.    Neurological: Negative.    Hematological: Negative.    Psychiatric/Behavioral: Negative.  Negative for agitation.         OBJECTIVE    Pulse 114   Ht 152.4 cm (60\")   Wt 80.7 kg (178 lb)   SpO2 98%   BMI 34.76 kg/m²     Physical Exam  Vitals reviewed.   Constitutional:       General: She is not in acute distress.     Appearance: She is well-developed.   HENT:      Head: Normocephalic and atraumatic.      Nose: Nose normal.   Eyes:      Conjunctiva/sclera: Conjunctivae normal.      Pupils: Pupils are equal, round, and reactive to light.   Pulmonary:      Effort: Pulmonary effort is normal. No respiratory distress.      Breath sounds: No wheezing.   Musculoskeletal:         General: No deformity.   Skin:     General: Skin is warm and dry.      Capillary Refill: Capillary refill takes less than 2 seconds.   Neurological:      Mental Status: She is alert and oriented to person, place, and time.   Psychiatric:         Behavior: Behavior normal.         Thought Content: Thought content normal.          Lower " Extremity Exam:     Cardiovascular:    DP pulse palpable bilateral  Pt pulse palpable bilateral  CFT brisk to all digits bilateral  Pedal hair growth diminished bilateral  Edema to right lower extremity  Musculoskeletal:  Muscle strength is WNL on the left and significantly decreased on the right  ROM of the 1st MTP is WNL bilateral  ROM of the ankle joint is decreased on the right within normal limits on the left  Dermatological:   Skin warm, dry and intact bilateral  Webspaces 1-4 bilateral are clean, dry and intact.   No subcutaneous nodules or masses noted bilateral  Toenails 1 through 5 bilateral are slightly discolored and elongated but within normal limits for  thickness  Neurological:   Protective sensation intact 10/10 sites bilateral  Sensation intact to light touch bilateral      Foot/Ankle Exam        Procedures        ASSESSMENT AND PLAN    Diagnoses and all orders for this visit:    1. Encounter for diabetic foot exam (HCC) (Primary)    2. Type 2 diabetes mellitus without complication, without long-term current use of insulin (HCC)    3. Right hemiparesis (HCC)    4. History of CVA (cerebrovascular accident)        - A diabetic foot screening exam was performed and the patient was educated on the foot complications related to diabetes,  preventative foot care and what signs and symptoms to watch for.  Instructed to contact our office if any foot problems develop before next visit.  -Patient may benefit from custom AFO in the future.  Currently she is ambulating well with walker  -All of her questions were answered  -Recheck 1 year          This document has been electronically signed by Severo Harris DPM on January 18, 2022 12:25 CST     1/18/2022  12:25 CST

## 2022-02-07 DIAGNOSIS — R74.8 ELEVATED ALKALINE PHOSPHATASE LEVEL: ICD-10-CM

## 2022-02-10 DIAGNOSIS — R16.0 ENLARGED LIVER: ICD-10-CM

## 2022-02-10 DIAGNOSIS — R74.8 ELEVATED ALKALINE PHOSPHATASE LEVEL: Primary | ICD-10-CM

## 2022-02-22 ENCOUNTER — OFFICE VISIT (OUTPATIENT)
Dept: GASTROENTEROLOGY | Facility: CLINIC | Age: 57
End: 2022-02-22

## 2022-02-22 VITALS
WEIGHT: 183 LBS | DIASTOLIC BLOOD PRESSURE: 81 MMHG | SYSTOLIC BLOOD PRESSURE: 144 MMHG | BODY MASS INDEX: 35.93 KG/M2 | HEIGHT: 60 IN | HEART RATE: 81 BPM

## 2022-02-22 DIAGNOSIS — R16.0 HEPATOMEGALY: ICD-10-CM

## 2022-02-22 DIAGNOSIS — E71.30 DISORDER OF FATTY-ACID METABOLISM, UNSPECIFIED: ICD-10-CM

## 2022-02-22 DIAGNOSIS — R93.2 ABNORMAL FINDINGS ON DIAGNOSTIC IMAGING OF LIVER AND BILIARY TRACT: ICD-10-CM

## 2022-02-22 DIAGNOSIS — R74.8 ALKALINE PHOSPHATASE ELEVATION: ICD-10-CM

## 2022-02-22 DIAGNOSIS — R74.8 ELEVATED LIVER ENZYMES: Primary | ICD-10-CM

## 2022-02-22 PROCEDURE — 99204 OFFICE O/P NEW MOD 45 MIN: CPT | Performed by: PHYSICIAN ASSISTANT

## 2022-03-14 ENCOUNTER — LAB (OUTPATIENT)
Dept: LAB | Facility: HOSPITAL | Age: 57
End: 2022-03-14

## 2022-03-14 ENCOUNTER — OFFICE VISIT (OUTPATIENT)
Dept: FAMILY MEDICINE CLINIC | Facility: CLINIC | Age: 57
End: 2022-03-14

## 2022-03-14 VITALS
WEIGHT: 183 LBS | HEIGHT: 60 IN | HEART RATE: 106 BPM | TEMPERATURE: 96.6 F | SYSTOLIC BLOOD PRESSURE: 138 MMHG | DIASTOLIC BLOOD PRESSURE: 90 MMHG | BODY MASS INDEX: 35.93 KG/M2 | OXYGEN SATURATION: 97 %

## 2022-03-14 DIAGNOSIS — I10 ESSENTIAL HYPERTENSION: Primary | ICD-10-CM

## 2022-03-14 DIAGNOSIS — Z12.4 CERVICAL CANCER SCREENING: ICD-10-CM

## 2022-03-14 DIAGNOSIS — E11.9 TYPE 2 DIABETES MELLITUS WITHOUT COMPLICATION, WITHOUT LONG-TERM CURRENT USE OF INSULIN: ICD-10-CM

## 2022-03-14 DIAGNOSIS — E55.9 VITAMIN D DEFICIENCY: ICD-10-CM

## 2022-03-14 DIAGNOSIS — E66.01 CLASS 2 SEVERE OBESITY WITH SERIOUS COMORBIDITY AND BODY MASS INDEX (BMI) OF 35.0 TO 35.9 IN ADULT, UNSPECIFIED OBESITY TYPE: ICD-10-CM

## 2022-03-14 DIAGNOSIS — G81.91 RIGHT HEMIPARESIS: ICD-10-CM

## 2022-03-14 PROCEDURE — 84466 ASSAY OF TRANSFERRIN: CPT | Performed by: PHYSICIAN ASSISTANT

## 2022-03-14 PROCEDURE — 82172 ASSAY OF APOLIPOPROTEIN: CPT | Performed by: PHYSICIAN ASSISTANT

## 2022-03-14 PROCEDURE — 99214 OFFICE O/P EST MOD 30 MIN: CPT | Performed by: NURSE PRACTITIONER

## 2022-03-14 PROCEDURE — 83010 ASSAY OF HAPTOGLOBIN QUANT: CPT | Performed by: PHYSICIAN ASSISTANT

## 2022-03-14 PROCEDURE — 82465 ASSAY BLD/SERUM CHOLESTEROL: CPT | Performed by: PHYSICIAN ASSISTANT

## 2022-03-14 PROCEDURE — 86015 ACTIN ANTIBODY EACH: CPT | Performed by: PHYSICIAN ASSISTANT

## 2022-03-14 PROCEDURE — 83883 ASSAY NEPHELOMETRY NOT SPEC: CPT | Performed by: PHYSICIAN ASSISTANT

## 2022-03-14 PROCEDURE — 82390 ASSAY OF CERULOPLASMIN: CPT | Performed by: PHYSICIAN ASSISTANT

## 2022-03-14 PROCEDURE — 80074 ACUTE HEPATITIS PANEL: CPT | Performed by: PHYSICIAN ASSISTANT

## 2022-03-14 PROCEDURE — 83540 ASSAY OF IRON: CPT | Performed by: PHYSICIAN ASSISTANT

## 2022-03-14 PROCEDURE — 84478 ASSAY OF TRIGLYCERIDES: CPT | Performed by: PHYSICIAN ASSISTANT

## 2022-03-14 PROCEDURE — 86381 MITOCHONDRIAL ANTIBODY EACH: CPT | Performed by: PHYSICIAN ASSISTANT

## 2022-03-14 PROCEDURE — 82728 ASSAY OF FERRITIN: CPT | Performed by: PHYSICIAN ASSISTANT

## 2022-03-14 PROCEDURE — 85025 COMPLETE CBC W/AUTO DIFF WBC: CPT | Performed by: PHYSICIAN ASSISTANT

## 2022-03-14 PROCEDURE — 82103 ALPHA-1-ANTITRYPSIN TOTAL: CPT | Performed by: PHYSICIAN ASSISTANT

## 2022-03-14 PROCEDURE — 85610 PROTHROMBIN TIME: CPT | Performed by: PHYSICIAN ASSISTANT

## 2022-03-14 PROCEDURE — 86038 ANTINUCLEAR ANTIBODIES: CPT | Performed by: PHYSICIAN ASSISTANT

## 2022-03-14 PROCEDURE — 82105 ALPHA-FETOPROTEIN SERUM: CPT | Performed by: PHYSICIAN ASSISTANT

## 2022-03-14 PROCEDURE — 82977 ASSAY OF GGT: CPT | Performed by: PHYSICIAN ASSISTANT

## 2022-03-14 PROCEDURE — 80053 COMPREHEN METABOLIC PANEL: CPT | Performed by: PHYSICIAN ASSISTANT

## 2022-03-14 RX ORDER — ERGOCALCIFEROL 1.25 MG/1
50000 CAPSULE ORAL
Qty: 6 CAPSULE | Refills: 1 | Status: SHIPPED | OUTPATIENT
Start: 2022-03-14 | End: 2023-01-19

## 2022-03-14 RX ORDER — AMLODIPINE BESYLATE 10 MG/1
10 TABLET ORAL EVERY MORNING
Qty: 30 TABLET | Refills: 5 | Status: SHIPPED | OUTPATIENT
Start: 2022-03-14 | End: 2022-09-28 | Stop reason: SDUPTHER

## 2022-03-15 LAB
ALBUMIN SERPL-MCNC: 4.4 G/DL (ref 3.5–5.2)
ALBUMIN/GLOB SERPL: 1.3 G/DL
ALP SERPL-CCNC: 188 U/L (ref 39–117)
ALPHA-FETOPROTEIN: 2.79 NG/ML (ref 0–8.3)
ALPHA1 GLOB MFR UR ELPH: 135 MG/DL (ref 90–200)
ALT SERPL W P-5'-P-CCNC: 20 U/L (ref 1–33)
ANION GAP SERPL CALCULATED.3IONS-SCNC: 16.2 MMOL/L (ref 5–15)
AST SERPL-CCNC: 18 U/L (ref 1–32)
BASOPHILS # BLD AUTO: 0.07 10*3/MM3 (ref 0–0.2)
BASOPHILS NFR BLD AUTO: 1 % (ref 0–1.5)
BILIRUB SERPL-MCNC: 0.7 MG/DL (ref 0–1.2)
BUN SERPL-MCNC: 12 MG/DL (ref 6–20)
BUN/CREAT SERPL: 17.1 (ref 7–25)
CALCIUM SPEC-SCNC: 9.5 MG/DL (ref 8.6–10.5)
CERULOPLASMIN SERPL-MCNC: 28 MG/DL (ref 19–39)
CHLORIDE SERPL-SCNC: 104 MMOL/L (ref 98–107)
CO2 SERPL-SCNC: 20.8 MMOL/L (ref 22–29)
CREAT SERPL-MCNC: 0.7 MG/DL (ref 0.57–1)
DEPRECATED RDW RBC AUTO: 43.4 FL (ref 37–54)
EGFRCR SERPLBLD CKD-EPI 2021: 101 ML/MIN/1.73
EOSINOPHIL # BLD AUTO: 0.4 10*3/MM3 (ref 0–0.4)
EOSINOPHIL NFR BLD AUTO: 5.8 % (ref 0.3–6.2)
ERYTHROCYTE [DISTWIDTH] IN BLOOD BY AUTOMATED COUNT: 13.9 % (ref 12.3–15.4)
FERRITIN SERPL-MCNC: 184 NG/ML (ref 13–150)
GLOBULIN UR ELPH-MCNC: 3.5 GM/DL
GLUCOSE SERPL-MCNC: 127 MG/DL (ref 65–99)
HAV IGM SERPL QL IA: NORMAL
HBV CORE IGM SERPL QL IA: NORMAL
HBV SURFACE AG SERPL QL IA: NORMAL
HCT VFR BLD AUTO: 40.3 % (ref 34–46.6)
HCV AB SER DONR QL: NORMAL
HGB BLD-MCNC: 14 G/DL (ref 12–15.9)
IMM GRANULOCYTES # BLD AUTO: 0.1 10*3/MM3 (ref 0–0.05)
IMM GRANULOCYTES NFR BLD AUTO: 1.5 % (ref 0–0.5)
INR PPP: 1.12 (ref 0.8–1.2)
IRON 24H UR-MRATE: 68 MCG/DL (ref 37–145)
IRON SATN MFR SERPL: 18 % (ref 20–50)
LYMPHOCYTES # BLD AUTO: 1.78 10*3/MM3 (ref 0.7–3.1)
LYMPHOCYTES NFR BLD AUTO: 25.9 % (ref 19.6–45.3)
MCH RBC QN AUTO: 30.2 PG (ref 26.6–33)
MCHC RBC AUTO-ENTMCNC: 34.7 G/DL (ref 31.5–35.7)
MCV RBC AUTO: 87 FL (ref 79–97)
MONOCYTES # BLD AUTO: 0.55 10*3/MM3 (ref 0.1–0.9)
MONOCYTES NFR BLD AUTO: 8 % (ref 5–12)
NEUTROPHILS NFR BLD AUTO: 3.98 10*3/MM3 (ref 1.7–7)
NEUTROPHILS NFR BLD AUTO: 57.8 % (ref 42.7–76)
NRBC BLD AUTO-RTO: 0 /100 WBC (ref 0–0.2)
PLATELET # BLD AUTO: 286 10*3/MM3 (ref 140–450)
PMV BLD AUTO: 11.9 FL (ref 6–12)
POTASSIUM SERPL-SCNC: 4.1 MMOL/L (ref 3.5–5.2)
PROT SERPL-MCNC: 7.9 G/DL (ref 6–8.5)
PROTHROMBIN TIME: 14.3 SECONDS (ref 11.1–15.3)
RBC # BLD AUTO: 4.63 10*6/MM3 (ref 3.77–5.28)
SODIUM SERPL-SCNC: 141 MMOL/L (ref 136–145)
TIBC SERPL-MCNC: 368 MCG/DL (ref 298–536)
TRANSFERRIN SERPL-MCNC: 247 MG/DL (ref 200–360)
WBC NRBC COR # BLD: 6.88 10*3/MM3 (ref 3.4–10.8)

## 2022-03-16 LAB
MITOCHONDRIA M2 IGG SER-ACNC: <20 UNITS (ref 0–20)
SMA IGG SER-ACNC: 17 UNITS (ref 0–19)

## 2022-03-17 LAB
A2 MACROGLOB SERPL-MCNC: 140 MG/DL (ref 110–276)
ALT SERPL W P-5'-P-CCNC: 22 IU/L (ref 0–40)
ANA TITR SER IF: NEGATIVE {TITER}
APO A-I SERPL-MCNC: 156 MG/DL (ref 116–209)
AST SERPL W P-5'-P-CCNC: 20 IU/L (ref 0–40)
BILIRUB SERPL-MCNC: 0.6 MG/DL (ref 0–1.2)
CHOLEST SERPL-MCNC: 139 MG/DL (ref 100–199)
FIBROSIS SCORING:: ABNORMAL
FIBROSIS STAGE SERPL QL: ABNORMAL
GGT SERPL-CCNC: 65 IU/L (ref 0–60)
GLUCOSE SERPL-MCNC: 131 MG/DL (ref 65–99)
HAPTOGLOB SERPL-MCNC: 83 MG/DL (ref 33–346)
INTERPRETATIONS: (REFERENCE): ABNORMAL
LABORATORY COMMENT REPORT: ABNORMAL
LIVER FIBR SCORE SERPL CALC.FIBROSURE: 0.2 (ref 0–0.21)
NASH SCORING (REFERENCE): ABNORMAL
NECROINFLAMMATORY ACT GRADE SERPL QL: ABNORMAL
NECROINFLAMMATORY ACT SCORE SERPL: 0.75
SERVICE CMNT-IMP: ABNORMAL
STEATOSIS GRADE (REFERENCE): ABNORMAL
STEATOSIS GRADING (REFERENCE): ABNORMAL
STEATOSIS SCORE (REFERENCE): 0.74 (ref 0–0.3)
TRIGL SERPL-MCNC: 199 MG/DL (ref 0–149)

## 2022-03-18 ENCOUNTER — OFFICE VISIT (OUTPATIENT)
Dept: GASTROENTEROLOGY | Facility: CLINIC | Age: 57
End: 2022-03-18

## 2022-03-18 VITALS
SYSTOLIC BLOOD PRESSURE: 125 MMHG | HEIGHT: 60 IN | HEART RATE: 109 BPM | WEIGHT: 183 LBS | BODY MASS INDEX: 35.93 KG/M2 | DIASTOLIC BLOOD PRESSURE: 73 MMHG

## 2022-03-18 DIAGNOSIS — R74.8 ELEVATED LIVER ENZYMES: Primary | ICD-10-CM

## 2022-03-18 DIAGNOSIS — R16.0 HEPATOMEGALY: ICD-10-CM

## 2022-03-18 DIAGNOSIS — K75.81 NASH (NONALCOHOLIC STEATOHEPATITIS): ICD-10-CM

## 2022-03-18 DIAGNOSIS — K74.00 HEPATIC FIBROSIS: ICD-10-CM

## 2022-03-18 DIAGNOSIS — R74.8 ALKALINE PHOSPHATASE ELEVATION: ICD-10-CM

## 2022-03-18 PROCEDURE — 99213 OFFICE O/P EST LOW 20 MIN: CPT | Performed by: PHYSICIAN ASSISTANT

## 2022-04-08 PROBLEM — E55.9 VITAMIN D DEFICIENCY: Status: ACTIVE | Noted: 2022-04-08

## 2022-04-08 PROBLEM — E66.01 CLASS 2 SEVERE OBESITY WITH SERIOUS COMORBIDITY AND BODY MASS INDEX (BMI) OF 35.0 TO 35.9 IN ADULT (HCC): Status: ACTIVE | Noted: 2018-04-02

## 2022-04-15 DIAGNOSIS — D64.9 ANEMIA, UNSPECIFIED TYPE: ICD-10-CM

## 2022-04-15 RX ORDER — FERROUS SULFATE 325(65) MG
TABLET ORAL
Qty: 60 TABLET | Refills: 5 | Status: SHIPPED | OUTPATIENT
Start: 2022-04-15 | End: 2022-09-28 | Stop reason: SDUPTHER

## 2022-06-14 DIAGNOSIS — M62.838 MUSCLE SPASM: ICD-10-CM

## 2022-06-14 DIAGNOSIS — K21.9 GASTROESOPHAGEAL REFLUX DISEASE WITHOUT ESOPHAGITIS: ICD-10-CM

## 2022-06-14 RX ORDER — BACLOFEN 5 MG/1
TABLET ORAL
Qty: 90 TABLET | Refills: 0 | Status: SHIPPED | OUTPATIENT
Start: 2022-06-14 | End: 2022-07-14

## 2022-06-14 RX ORDER — PANTOPRAZOLE SODIUM 20 MG/1
TABLET, DELAYED RELEASE ORAL
Qty: 30 TABLET | Refills: 0 | Status: SHIPPED | OUTPATIENT
Start: 2022-06-14 | End: 2022-07-14

## 2022-07-14 DIAGNOSIS — I10 ESSENTIAL HYPERTENSION: ICD-10-CM

## 2022-07-14 DIAGNOSIS — M62.838 MUSCLE SPASM: ICD-10-CM

## 2022-07-14 DIAGNOSIS — K21.9 GASTROESOPHAGEAL REFLUX DISEASE WITHOUT ESOPHAGITIS: ICD-10-CM

## 2022-07-14 DIAGNOSIS — I63.9 CEREBROVASCULAR ACCIDENT (CVA), UNSPECIFIED MECHANISM: ICD-10-CM

## 2022-07-14 RX ORDER — BACLOFEN 5 MG/1
TABLET ORAL
Qty: 90 TABLET | Refills: 3 | Status: SHIPPED | OUTPATIENT
Start: 2022-07-14 | End: 2023-01-19

## 2022-07-14 RX ORDER — PANTOPRAZOLE SODIUM 20 MG/1
TABLET, DELAYED RELEASE ORAL
Qty: 90 TABLET | Refills: 3 | Status: SHIPPED | OUTPATIENT
Start: 2022-07-14 | End: 2023-01-19 | Stop reason: SDUPTHER

## 2022-07-14 RX ORDER — CLOPIDOGREL BISULFATE 75 MG/1
TABLET ORAL
Qty: 30 TABLET | Refills: 5 | Status: SHIPPED | OUTPATIENT
Start: 2022-07-14 | End: 2023-01-19 | Stop reason: SDUPTHER

## 2022-07-14 RX ORDER — LOSARTAN POTASSIUM 100 MG/1
TABLET ORAL
Qty: 30 TABLET | Refills: 5 | Status: SHIPPED | OUTPATIENT
Start: 2022-07-14 | End: 2023-01-19 | Stop reason: SDUPTHER

## 2022-07-14 RX ORDER — ASPIRIN 81 MG/1
TABLET, COATED ORAL
Qty: 30 TABLET | Refills: 11 | Status: SHIPPED | OUTPATIENT
Start: 2022-07-14 | End: 2023-01-19 | Stop reason: SDUPTHER

## 2022-08-15 DIAGNOSIS — E78.5 HYPERLIPIDEMIA, UNSPECIFIED HYPERLIPIDEMIA TYPE: ICD-10-CM

## 2022-08-15 RX ORDER — ATORVASTATIN CALCIUM 40 MG/1
TABLET, FILM COATED ORAL
Qty: 30 TABLET | Refills: 6 | OUTPATIENT
Start: 2022-08-15

## 2022-08-18 NOTE — TELEPHONE ENCOUNTER
Spoke to patient and scheduled follow up appointment on Monday 08/22/22 for labs and medication refills.

## 2022-09-12 DIAGNOSIS — E78.5 HYPERLIPIDEMIA, UNSPECIFIED HYPERLIPIDEMIA TYPE: ICD-10-CM

## 2022-09-14 RX ORDER — ATORVASTATIN CALCIUM 40 MG/1
TABLET, FILM COATED ORAL
Qty: 30 TABLET | Refills: 0 | OUTPATIENT
Start: 2022-09-14

## 2022-09-20 ENCOUNTER — OFFICE VISIT (OUTPATIENT)
Dept: GASTROENTEROLOGY | Facility: CLINIC | Age: 57
End: 2022-09-20

## 2022-09-20 VITALS
WEIGHT: 179 LBS | HEART RATE: 104 BPM | DIASTOLIC BLOOD PRESSURE: 83 MMHG | SYSTOLIC BLOOD PRESSURE: 148 MMHG | BODY MASS INDEX: 35.14 KG/M2 | HEIGHT: 60 IN

## 2022-09-20 DIAGNOSIS — R16.0 HEPATOMEGALY: ICD-10-CM

## 2022-09-20 DIAGNOSIS — R74.8 ALKALINE PHOSPHATASE ELEVATION: ICD-10-CM

## 2022-09-20 DIAGNOSIS — K74.00 HEPATIC FIBROSIS: ICD-10-CM

## 2022-09-20 DIAGNOSIS — R74.8 ELEVATED LIVER ENZYMES: Primary | ICD-10-CM

## 2022-09-20 DIAGNOSIS — K75.81 NASH (NONALCOHOLIC STEATOHEPATITIS): ICD-10-CM

## 2022-09-20 PROCEDURE — 99213 OFFICE O/P EST LOW 20 MIN: CPT | Performed by: PHYSICIAN ASSISTANT

## 2022-09-22 ENCOUNTER — OFFICE VISIT (OUTPATIENT)
Dept: FAMILY MEDICINE CLINIC | Facility: CLINIC | Age: 57
End: 2022-09-22

## 2022-09-22 ENCOUNTER — LAB (OUTPATIENT)
Dept: LAB | Facility: HOSPITAL | Age: 57
End: 2022-09-22

## 2022-09-22 VITALS
TEMPERATURE: 97.6 F | HEIGHT: 60 IN | DIASTOLIC BLOOD PRESSURE: 82 MMHG | SYSTOLIC BLOOD PRESSURE: 140 MMHG | OXYGEN SATURATION: 99 % | BODY MASS INDEX: 36.28 KG/M2 | HEART RATE: 85 BPM | WEIGHT: 184.8 LBS

## 2022-09-22 DIAGNOSIS — D64.9 ANEMIA, UNSPECIFIED TYPE: ICD-10-CM

## 2022-09-22 DIAGNOSIS — K75.81 NASH (NONALCOHOLIC STEATOHEPATITIS): ICD-10-CM

## 2022-09-22 DIAGNOSIS — R74.8 ALKALINE PHOSPHATASE ELEVATION: ICD-10-CM

## 2022-09-22 DIAGNOSIS — E78.5 HYPERLIPIDEMIA, UNSPECIFIED HYPERLIPIDEMIA TYPE: ICD-10-CM

## 2022-09-22 DIAGNOSIS — E11.9 TYPE 2 DIABETES MELLITUS WITHOUT COMPLICATION, WITHOUT LONG-TERM CURRENT USE OF INSULIN: ICD-10-CM

## 2022-09-22 DIAGNOSIS — R16.0 HEPATOMEGALY: ICD-10-CM

## 2022-09-22 DIAGNOSIS — I10 ESSENTIAL HYPERTENSION: Primary | ICD-10-CM

## 2022-09-22 DIAGNOSIS — K74.00 HEPATIC FIBROSIS: ICD-10-CM

## 2022-09-22 DIAGNOSIS — R74.8 ELEVATED LIVER ENZYMES: ICD-10-CM

## 2022-09-22 DIAGNOSIS — E66.01 CLASS 2 SEVERE OBESITY DUE TO EXCESS CALORIES WITH SERIOUS COMORBIDITY AND BODY MASS INDEX (BMI) OF 36.0 TO 36.9 IN ADULT: ICD-10-CM

## 2022-09-22 PROCEDURE — 82043 UR ALBUMIN QUANTITATIVE: CPT

## 2022-09-22 PROCEDURE — 83540 ASSAY OF IRON: CPT

## 2022-09-22 PROCEDURE — 99214 OFFICE O/P EST MOD 30 MIN: CPT | Performed by: NURSE PRACTITIONER

## 2022-09-22 PROCEDURE — 80061 LIPID PANEL: CPT

## 2022-09-22 PROCEDURE — 83036 HEMOGLOBIN GLYCOSYLATED A1C: CPT

## 2022-09-22 PROCEDURE — 84466 ASSAY OF TRANSFERRIN: CPT

## 2022-09-22 PROCEDURE — 85610 PROTHROMBIN TIME: CPT

## 2022-09-22 PROCEDURE — 80053 COMPREHEN METABOLIC PANEL: CPT

## 2022-09-23 LAB
ALBUMIN SERPL-MCNC: 4.5 G/DL (ref 3.5–5.2)
ALBUMIN UR-MCNC: 1.8 MG/DL
ALBUMIN/GLOB SERPL: 1.6 G/DL
ALP SERPL-CCNC: 168 U/L (ref 39–117)
ALT SERPL W P-5'-P-CCNC: 24 U/L (ref 1–33)
ANION GAP SERPL CALCULATED.3IONS-SCNC: 14 MMOL/L (ref 5–15)
AST SERPL-CCNC: 19 U/L (ref 1–32)
BILIRUB SERPL-MCNC: 0.7 MG/DL (ref 0–1.2)
BUN SERPL-MCNC: 10 MG/DL (ref 6–20)
BUN/CREAT SERPL: 16.1 (ref 7–25)
CALCIUM SPEC-SCNC: 9.5 MG/DL (ref 8.6–10.5)
CHLORIDE SERPL-SCNC: 104 MMOL/L (ref 98–107)
CHOLEST SERPL-MCNC: 127 MG/DL (ref 0–200)
CO2 SERPL-SCNC: 24 MMOL/L (ref 22–29)
CREAT SERPL-MCNC: 0.62 MG/DL (ref 0.57–1)
EGFRCR SERPLBLD CKD-EPI 2021: 104 ML/MIN/1.73
GLOBULIN UR ELPH-MCNC: 2.9 GM/DL
GLUCOSE SERPL-MCNC: 97 MG/DL (ref 65–99)
HBA1C MFR BLD: 7.1 % (ref 4.8–5.6)
HDLC SERPL-MCNC: 46 MG/DL (ref 40–60)
INR PPP: 1.14 (ref 0.8–1.2)
IRON 24H UR-MRATE: 54 MCG/DL (ref 37–145)
IRON SATN MFR SERPL: 15 % (ref 20–50)
LDLC SERPL CALC-MCNC: 62 MG/DL (ref 0–100)
LDLC/HDLC SERPL: 1.32 {RATIO}
POTASSIUM SERPL-SCNC: 3.9 MMOL/L (ref 3.5–5.2)
PROT SERPL-MCNC: 7.4 G/DL (ref 6–8.5)
PROTHROMBIN TIME: 14.6 SECONDS (ref 11.1–15.3)
SODIUM SERPL-SCNC: 142 MMOL/L (ref 136–145)
TIBC SERPL-MCNC: 358 MCG/DL (ref 298–536)
TRANSFERRIN SERPL-MCNC: 240 MG/DL (ref 200–360)
TRIGL SERPL-MCNC: 101 MG/DL (ref 0–150)
VLDLC SERPL-MCNC: 19 MG/DL (ref 5–40)

## 2022-09-28 DIAGNOSIS — E78.5 HYPERLIPIDEMIA, UNSPECIFIED HYPERLIPIDEMIA TYPE: ICD-10-CM

## 2022-09-28 PROBLEM — D64.9 ANEMIA: Status: ACTIVE | Noted: 2022-09-28

## 2022-09-28 RX ORDER — ATORVASTATIN CALCIUM 40 MG/1
TABLET, FILM COATED ORAL
Qty: 30 TABLET | Refills: 0 | Status: SHIPPED | OUTPATIENT
Start: 2022-09-28 | End: 2023-01-19 | Stop reason: SDUPTHER

## 2022-09-28 RX ORDER — AMLODIPINE BESYLATE 10 MG/1
10 TABLET ORAL EVERY MORNING
Qty: 90 TABLET | Refills: 0 | Status: SHIPPED | OUTPATIENT
Start: 2022-09-28 | End: 2023-01-19 | Stop reason: SDUPTHER

## 2022-09-28 RX ORDER — FERROUS SULFATE 325(65) MG
1 TABLET ORAL
Qty: 90 TABLET | Refills: 0 | Status: SHIPPED | OUTPATIENT
Start: 2022-09-28 | End: 2023-01-19 | Stop reason: SDUPTHER

## 2022-09-29 NOTE — PROGRESS NOTES
Chief Complaint  Hypertension and Diabetes    Subjective    History of Present Illness {CC  Problem List  Visit  Diagnosis   Encounters  Notes  Medications  Labs  Result Review Imaging  Media :23}     Modesta Kaur presents to [facility] [place] for   History of Present Illness  3 mos f/u on HTN and DM - denies c/o or concerns today - denies CP, SOA, HA, blurry vision or LE edema. Reports home BS range 60-90. Is requesting refills of Vit D, ferrous sulfate, amlodipine, metformin and atorvastatin.  Hypertension  This is a chronic problem. The current episode started more than 1 year ago. The problem is unchanged. The problem is controlled. Pertinent negatives include no anxiety, blurred vision, chest pain, headaches, malaise/fatigue, neck pain, orthopnea, palpitations, peripheral edema, PND, shortness of breath or sweats. There are no associated agents to hypertension. Risk factors for coronary artery disease include diabetes mellitus, dyslipidemia, family history, obesity, post-menopausal state and sedentary lifestyle. Current antihypertension treatment includes calcium channel blockers and angiotensin blockers. The current treatment provides moderate improvement. Hypertensive end-organ damage includes CVA. Identifiable causes of hypertension include sleep apnea.   Diabetes  She presents for her follow-up diabetic visit. She has type 2 diabetes mellitus. Her disease course has been stable. Pertinent negatives for hypoglycemia include no headaches or sweats. Associated symptoms include weakness. Pertinent negatives for diabetes include no blurred vision and no chest pain. Symptoms are stable. Diabetic complications include a CVA. Risk factors for coronary artery disease include dyslipidemia, diabetes mellitus, hypertension, obesity, post-menopausal and sedentary lifestyle. Current diabetic treatment includes oral agent (monotherapy). She is compliant with  treatment all of the time. Her weight is stable. Her overall blood glucose range is 70-90 mg/dl. An ACE inhibitor/angiotensin II receptor blocker is being taken.        Objective     Physical Exam  Vitals and nursing note reviewed.   Constitutional:       General: She is not in acute distress.     Appearance: Normal appearance. She is obese. She is not ill-appearing.      Comments: In WC today with cane present   HENT:      Head: Normocephalic and atraumatic.   Eyes:      Conjunctiva/sclera: Conjunctivae normal.      Pupils: Pupils are equal, round, and reactive to light.   Neck:      Vascular: No carotid bruit.   Cardiovascular:      Rate and Rhythm: Normal rate and regular rhythm.      Heart sounds: Normal heart sounds.   Pulmonary:      Effort: Pulmonary effort is normal.      Breath sounds: Normal breath sounds. No wheezing or rhonchi.   Musculoskeletal:         General: Normal range of motion.      Cervical back: Normal range of motion and neck supple.      Comments: Right sided upper extremity deficit - decreased  strength and ROM - pt reports she is left hand dominant - right hand stays in contracted position - used cane for ambulation   Lymphadenopathy:      Cervical: No cervical adenopathy.   Skin:     General: Skin is warm and dry.   Neurological:      General: No focal deficit present.      Mental Status: She is alert and oriented to person, place, and time.      Motor: Weakness (R sided d/t CVA) present.   Psychiatric:         Mood and Affect: Mood normal.         Behavior: Behavior normal.        Result Review  Data Reviewed:{ Labs  Result Review  Imaging  Med Tab  Media :23}   The following data was reviewed by (Optional):[id number]}  Common labs    Common Labs 12/2/21 12/2/21 3/14/22 3/14/22 3/14/22 9/22/22 9/22/22 9/22/22 9/22/22    1444 1444 1139 1139 1139 1545 1545 1545 1545   Glucose 116 (A)   127 (A)    97    BUN 14   12    10    Creatinine 0.88   0.70    0.62    eGFR  Am 80          "  Sodium 141   141    142    Potassium 4.3   4.1    3.9    Chloride 106   104    104    Calcium 9.8   9.5    9.5    Albumin 4.40   4.40    4.50    Total Bilirubin 0.7   0.7    0.7    Alkaline Phosphatase 195 (A)   188 (A)    168 (A)    AST (SGOT) 27   18    19    ALT (SGPT) 37 (A)   20    24    WBC   6.88         Hemoglobin   14.0         Hematocrit   40.3         Platelets   286         Total Cholesterol         127   Triglycerides     199 (A)    101   HDL Cholesterol         46   LDL Cholesterol          62   Hemoglobin A1C  6.84 (A)    7.10 (A)      Microalbumin, Urine       1.8     (A) Abnormal value            No Images in the past 120 days found..       Vital Signs:   /82 (BP Location: Left arm, Patient Position: Sitting, Cuff Size: Adult)   Pulse 85   Temp 97.6 °F (36.4 °C) (Temporal)   Ht 152.4 cm (60\")   Wt 83.8 kg (184 lb 12.8 oz)   SpO2 99%   BMI 36.09 kg/m²          Assessment and Plan {CC Problem List  Visit Diagnosis  ROS  Review (Popup)  Health Maintenance  Quality  BestPractice  Medications  SmartSets  SnapShot Encounters  Media :23}   Problem List Items Addressed This Visit        Cardiac and Vasculature    Essential hypertension - Primary    Relevant Medications    amLODIPine (NORVASC) 10 MG tablet    Hyperlipidemia    Relevant Orders    Lipid panel (Completed)       Endocrine and Metabolic    Type 2 diabetes mellitus without complication, without long-term current use of insulin (HCC)    Relevant Medications    metFORMIN (GLUCOPHAGE) 500 MG tablet    Other Relevant Orders    Hemoglobin A1c (Completed)    MicroAlbumin, Urine, Random - Urine, Clean Catch (Completed)    Class 2 severe obesity with serious comorbidity and body mass index (BMI) of 35.0 to 35.9 in adult (HCC)       Hematology and Neoplasia    Anemia    Relevant Medications    ferrous sulfate (FeroSul) 325 (65 FE) MG tablet    Other Relevant Orders    Iron and TIBC (Completed)         Diagnosis Plan   1. Essential " hypertension  amLODIPine (NORVASC) 10 MG tablet   2. Hyperlipidemia, unspecified hyperlipidemia type  Lipid panel   3. Type 2 diabetes mellitus without complication, without long-term current use of insulin ([facility])  Hemoglobin A1c    MicroAlbumin, Urine, Random - Urine, Clean Catch    metFORMIN (GLUCOPHAGE) 500 MG tablet   4. Anemia, unspecified type  Iron and TIBC    ferrous sulfate (FeroSul) 325 (65 FE) MG tablet   5. Class 2 severe obesity due to excess calories with serious comorbidity and body mass index (BMI) of 36.0 to 36.9 in adult ([facility])       - HTN - stable - refill amlodipine submitted - cont current dosing; cont losartan at current dosing - no refill needed today.   - HLP - lipid panel ordered - cont atorvastatin at current dosing - no refill needed today - encouraged low fat, heart healthy diet.   - T2DM - stable - A1c and microalbumin urine ordered - refill metformin submitted - cont current dosing.  - Anemia - Iron/TIBC ordered - has pending labs ordered by GI - Refill submitted for ferrous sulfate - take daily.   - Body mass index is 36.09 kg/m². Class 2 Severe Obesity (BMI >=35 and <=39.9). Obesity-related health conditions include the following: obstructive sleep apnea, hypertension, diabetes mellitus, dyslipidemias and GERD. Obesity is unchanged. BMI is is above average; BMI management plan is completed. We discussed portion control, increasing exercise and an yolanda-based approach such as MyFitness Pal or Lose It.  - Right side deficit d/t CVA - pt is left hand dominant  - tizanidine stopped d/t elevated alk phos   - Needs DEXA and Mammo - declined 12/30/21  - Previously ordered PAP - pt declined when called to schedule    Follow Up {Instructions Charge Capture  Follow-up Communications :23}   Return in about 6 months (around 3/22/2023), or if symptoms worsen or fail to improve, for Recheck.  Patient was given instructions and counseling regarding her condition or for health maintenance advice.  Please see specific information pulled into the AVS if appropriate            .  This document has been electronically signed by JESUS Hankins on September 28, 2022 22:35 CDT

## 2023-01-19 ENCOUNTER — OFFICE VISIT (OUTPATIENT)
Dept: FAMILY MEDICINE CLINIC | Facility: CLINIC | Age: 58
End: 2023-01-19
Payer: MEDICARE

## 2023-01-19 VITALS
DIASTOLIC BLOOD PRESSURE: 82 MMHG | TEMPERATURE: 98.6 F | SYSTOLIC BLOOD PRESSURE: 132 MMHG | BODY MASS INDEX: 37.03 KG/M2 | HEIGHT: 60 IN | WEIGHT: 188.6 LBS | OXYGEN SATURATION: 95 % | HEART RATE: 110 BPM

## 2023-01-19 DIAGNOSIS — E78.5 HYPERLIPIDEMIA, UNSPECIFIED HYPERLIPIDEMIA TYPE: Chronic | ICD-10-CM

## 2023-01-19 DIAGNOSIS — K21.9 GASTROESOPHAGEAL REFLUX DISEASE WITHOUT ESOPHAGITIS: Chronic | ICD-10-CM

## 2023-01-19 DIAGNOSIS — I10 ESSENTIAL HYPERTENSION: Chronic | ICD-10-CM

## 2023-01-19 DIAGNOSIS — I63.9 CEREBROVASCULAR ACCIDENT (CVA), UNSPECIFIED MECHANISM: Chronic | ICD-10-CM

## 2023-01-19 DIAGNOSIS — D64.9 ANEMIA, UNSPECIFIED TYPE: Chronic | ICD-10-CM

## 2023-01-19 DIAGNOSIS — E11.65 TYPE 2 DIABETES MELLITUS WITH HYPERGLYCEMIA, WITHOUT LONG-TERM CURRENT USE OF INSULIN: Primary | Chronic | ICD-10-CM

## 2023-01-19 LAB
EXPIRATION DATE: NORMAL
HBA1C MFR BLD: 7.7 %
Lab: NORMAL

## 2023-01-19 PROCEDURE — 83036 HEMOGLOBIN GLYCOSYLATED A1C: CPT | Performed by: NURSE PRACTITIONER

## 2023-01-19 PROCEDURE — 99215 OFFICE O/P EST HI 40 MIN: CPT | Performed by: NURSE PRACTITIONER

## 2023-01-19 PROCEDURE — 3051F HG A1C>EQUAL 7.0%<8.0%: CPT | Performed by: NURSE PRACTITIONER

## 2023-01-19 RX ORDER — CLOPIDOGREL BISULFATE 75 MG/1
75 TABLET ORAL DAILY
Qty: 90 TABLET | Refills: 0 | Status: SHIPPED | OUTPATIENT
Start: 2023-01-19 | End: 2023-01-27 | Stop reason: SDUPTHER

## 2023-01-19 RX ORDER — FERROUS SULFATE 325(65) MG
1 TABLET ORAL
Qty: 90 TABLET | Refills: 0 | Status: SHIPPED | OUTPATIENT
Start: 2023-01-19 | End: 2023-01-27 | Stop reason: SDUPTHER

## 2023-01-19 RX ORDER — L.ACID/L.CASEI/B.BIF/B.LON/FOS 2B CELL-50
1 CAPSULE ORAL
Qty: 90 CAPSULE | Refills: 0 | Status: SHIPPED | OUTPATIENT
Start: 2023-01-19 | End: 2023-01-27 | Stop reason: SDUPTHER

## 2023-01-19 RX ORDER — LOSARTAN POTASSIUM 100 MG/1
100 TABLET ORAL DAILY
Qty: 90 TABLET | Refills: 0 | Status: SHIPPED | OUTPATIENT
Start: 2023-01-19 | End: 2023-01-27 | Stop reason: SDUPTHER

## 2023-01-19 RX ORDER — ASPIRIN 81 MG/1
81 TABLET ORAL DAILY
Qty: 90 TABLET | Refills: 0 | Status: SHIPPED | OUTPATIENT
Start: 2023-01-19 | End: 2023-01-27 | Stop reason: SDUPTHER

## 2023-01-19 RX ORDER — PANTOPRAZOLE SODIUM 20 MG/1
20 TABLET, DELAYED RELEASE ORAL DAILY
Qty: 90 TABLET | Refills: 0 | Status: SHIPPED | OUTPATIENT
Start: 2023-01-19 | End: 2023-01-27 | Stop reason: SDUPTHER

## 2023-01-19 RX ORDER — AMLODIPINE BESYLATE 10 MG/1
10 TABLET ORAL EVERY MORNING
Qty: 90 TABLET | Refills: 0 | Status: SHIPPED | OUTPATIENT
Start: 2023-01-19 | End: 2023-01-27 | Stop reason: SDUPTHER

## 2023-01-19 RX ORDER — ATORVASTATIN CALCIUM 40 MG/1
40 TABLET, FILM COATED ORAL
Qty: 90 TABLET | Refills: 0 | Status: SHIPPED | OUTPATIENT
Start: 2023-01-19 | End: 2023-01-27 | Stop reason: SDUPTHER

## 2023-01-19 NOTE — PROGRESS NOTES
"Chief Complaint  Med Refill    Subjective          Modesta Kaur presents to [facility] - [place]    History of Present Illness  [facility]    PCP: previously JESUS Abdul, appt to establish with Dr. Forte on 1-    CC: \"med refills until can see new PCP\"    Presents needing refills on routine medications.  In between PCP providers and will run of of meds prior to new PCP appointment.  Doing well with no acute complaints today. Review of records indicate hx of HTN, T2DM, GERD and CVA with R side paresis - has had physical therapy on several occasions--ambulates with quad cane.  Last labs were Sept 2022.  Does not see neurology.     HTN/hyperlipidemia: BP well controlled today in office.  Denies any elevated readings at home.  No headaches, dizziness, chest pain, dyspnea or swelling reported.  Does not see cardiology.     T2DM: Currently on Metformin 500 mg BID.  Last A1C < 7.  Not checking glucose levels at home, reports she needs new glucometer.      GERD:  Currently on protonix.  Followed by gastro for GERD and elevated LFT's, hepatomegaly.  Seems to be doing well on current treatments.  Denies epigastric pain, melena or weight loss.     Chronic anemia, on iron supplements daily.  Last labs Sept 2022.  Denies any increased fatigue.     Hx of Vit D deficiency, but off meds for a long time and last levels were WNL.     Screenings: colonoscopy, mammogram, Pap--reports none have been updated since she had CVA. Will discuss with PCP.         Review of Systems   HENT: Negative.    Eyes: Negative.    Respiratory: Negative.    Cardiovascular: Negative.    Gastrointestinal: Negative.    Genitourinary: Negative.    Musculoskeletal: Negative.    Skin: Negative.    Neurological: Positive for weakness (right sided paresis). Negative for dizziness, speech difficulty, light-headedness and headaches.        Objective   Vital Signs:   /82 (BP Location: " "Left arm)   Pulse 110   Temp 98.6 °F (37 °C)   Ht 152.4 cm (60\")   Wt 85.5 kg (188 lb 9.6 oz)   SpO2 95%   BMI 36.83 kg/m²       Physical Exam  Vitals and nursing note reviewed.   Constitutional:       General: She is not in acute distress.     Appearance: She is not ill-appearing.   HENT:      Head: Normocephalic and atraumatic.   Cardiovascular:      Rate and Rhythm: Normal rate and regular rhythm.      Comments: Apical rate: 92  Pulmonary:      Effort: Pulmonary effort is normal. No respiratory distress.      Breath sounds: Normal breath sounds. No wheezing, rhonchi or rales.   Abdominal:      General: Bowel sounds are normal.      Palpations: Abdomen is soft.      Tenderness: There is no abdominal tenderness. There is no right CVA tenderness, left CVA tenderness, guarding or rebound.   Musculoskeletal:      Cervical back: Neck supple. No tenderness.      Right lower leg: Edema (trace, pitting) present.      Left lower leg: Edema (trace, pitting) present.   Lymphadenopathy:      Cervical: No cervical adenopathy.   Skin:     General: Skin is warm and dry.   Neurological:      Mental Status: She is alert and oriented to person, place, and time.      Motor: Weakness (right sided paresis) present.      Gait: Gait abnormal ( right sided paresis, drags right leg,).   Psychiatric:         Mood and Affect: Mood normal.         Thought Content: Thought content normal.          Result Review :     Common labs    Common Labs 3/14/22 3/14/22 3/14/22 9/22/22 9/22/22 9/22/22 9/22/22 1/19/23    1139 1139 1139 1545 1545 1545 1545    Glucose  127 (A)    97     BUN  12    10     Creatinine  0.70    0.62     Sodium  141    142     Potassium  4.1    3.9     Chloride  104    104     Calcium  9.5    9.5     Albumin  4.40    4.50     Total Bilirubin  0.7    0.7     Alkaline Phosphatase  188 (A)    168 (A)     AST (SGOT)  18    19     ALT (SGPT)  20    24     WBC 6.88          Hemoglobin 14.0          Hematocrit 40.3        "   Platelets 286          Total Cholesterol       127    Triglycerides   199 (A)    101    HDL Cholesterol       46    LDL Cholesterol        62    Hemoglobin A1C    7.10 (A)    7.7   Microalbumin, Urine     1.8      (A) Abnormal value                          Assessment and Plan    Diagnoses and all orders for this visit:    1. Type 2 diabetes mellitus with hyperglycemia, without long-term current use of insulin ([facility]) (Primary)  -     Blood Glucose Monitoring Suppl w/Device kit; Check blood sugar once daily for hyperglycemia  Dispense: 1 each; Refill: 0  -     Lancets 30G misc; 1 each Daily.  Dispense: 100 each; Refill: 12  -     glucose blood test strip; Check blood sugar once daily for hyperglycemia  Dispense: 50 each; Refill: 12  -     metFORMIN (Glucophage) 850 MG tablet; Take 1 tablet by mouth 2 (Two) Times a Day With Meals for 90 days.  Dispense: 180 tablet; Refill: 0  -     POC Glycosylated Hemoglobin (Hb A1C)    2. Gastroesophageal reflux disease without esophagitis  -     Probiotic Product (Probiotic Blend) capsule; Take 1 capsule by mouth Daily With Lunch.  Dispense: 90 capsule; Refill: 0  -     pantoprazole (PROTONIX) 20 MG EC tablet; Take 1 tablet by mouth Daily.  Dispense: 90 tablet; Refill: 0    3. Essential hypertension  -     losartan (COZAAR) 100 MG tablet; Take 1 tablet by mouth Daily.  Dispense: 90 tablet; Refill: 0  -     amLODIPine (NORVASC) 10 MG tablet; Take 1 tablet by mouth Every Morning.  Dispense: 90 tablet; Refill: 0    4. Anemia, unspecified type  -     ferrous sulfate (FeroSul) 325 (65 FE) MG tablet; Take 1 tablet by mouth Daily With Breakfast.  Dispense: 90 tablet; Refill: 0    5. Cerebrovascular accident (CVA), unspecified mechanism ([facility])  -     clopidogrel (PLAVIX) 75 MG tablet; Take 1 tablet by mouth Daily.  Dispense: 90 tablet; Refill: 0  -     aspirin (Aspirin Low Dose) 81 MG EC tablet; Take 1 tablet by mouth Daily.  Dispense: 90 tablet; Refill: 0    6. Hyperlipidemia, unspecified  hyperlipidemia type  -     atorvastatin (LIPITOR) 40 MG tablet; Take 1 tablet by mouth every night at bedtime.  Dispense: 90 tablet; Refill: 0      Refills of above meds provided until she can establish with new PCP.   Same dosing on all meds except Metformin, will increase to 850 mg BID--new Rx given  Rx for new glucometer and supplies provided    Return to Same Day Clinic for any acute problems/concerns    See new PCP as scheduled to establish care and management of all chronic medical conditions      This document has been electronically signed by JESUS Botello on January 19, 2023 14:17 CST,.    I spent 40 minutes caring for Modesta on this date of service. This time includes time spent by me in the following activities:preparing for the visit, reviewing tests, performing a medically appropriate examination and/or evaluation , counseling and educating the patient/family/caregiver, ordering medications, tests, or procedures and documenting information in the medical record

## 2023-01-27 ENCOUNTER — OFFICE VISIT (OUTPATIENT)
Dept: FAMILY MEDICINE CLINIC | Facility: CLINIC | Age: 58
End: 2023-01-27
Payer: MEDICARE

## 2023-01-27 VITALS
HEART RATE: 125 BPM | DIASTOLIC BLOOD PRESSURE: 72 MMHG | HEIGHT: 60 IN | SYSTOLIC BLOOD PRESSURE: 100 MMHG | OXYGEN SATURATION: 97 % | BODY MASS INDEX: 37.22 KG/M2 | WEIGHT: 189.6 LBS

## 2023-01-27 DIAGNOSIS — Z79.01 CURRENT USE OF LONG TERM ANTICOAGULATION: ICD-10-CM

## 2023-01-27 DIAGNOSIS — I63.9 CEREBROVASCULAR ACCIDENT (CVA), UNSPECIFIED MECHANISM: Chronic | ICD-10-CM

## 2023-01-27 DIAGNOSIS — K21.9 GASTROESOPHAGEAL REFLUX DISEASE WITHOUT ESOPHAGITIS: Chronic | ICD-10-CM

## 2023-01-27 DIAGNOSIS — R00.0 TACHYCARDIA: Primary | ICD-10-CM

## 2023-01-27 DIAGNOSIS — Z12.4 CERVICAL CANCER SCREENING: ICD-10-CM

## 2023-01-27 DIAGNOSIS — E78.5 HYPERLIPIDEMIA, UNSPECIFIED HYPERLIPIDEMIA TYPE: Chronic | ICD-10-CM

## 2023-01-27 DIAGNOSIS — D64.9 ANEMIA, UNSPECIFIED TYPE: Chronic | ICD-10-CM

## 2023-01-27 DIAGNOSIS — Z12.31 ENCOUNTER FOR SCREENING MAMMOGRAM FOR MALIGNANT NEOPLASM OF BREAST: ICD-10-CM

## 2023-01-27 DIAGNOSIS — I10 ESSENTIAL HYPERTENSION: ICD-10-CM

## 2023-01-27 DIAGNOSIS — E11.65 TYPE 2 DIABETES MELLITUS WITH HYPERGLYCEMIA, WITHOUT LONG-TERM CURRENT USE OF INSULIN: ICD-10-CM

## 2023-01-27 PROCEDURE — 93010 ELECTROCARDIOGRAM REPORT: CPT | Performed by: INTERNAL MEDICINE

## 2023-01-27 PROCEDURE — 93005 ELECTROCARDIOGRAM TRACING: CPT | Performed by: STUDENT IN AN ORGANIZED HEALTH CARE EDUCATION/TRAINING PROGRAM

## 2023-01-27 PROCEDURE — 99215 OFFICE O/P EST HI 40 MIN: CPT | Performed by: STUDENT IN AN ORGANIZED HEALTH CARE EDUCATION/TRAINING PROGRAM

## 2023-01-27 RX ORDER — CLOPIDOGREL BISULFATE 75 MG/1
75 TABLET ORAL DAILY
Qty: 90 TABLET | Refills: 3 | Status: SHIPPED | OUTPATIENT
Start: 2023-01-27

## 2023-01-27 RX ORDER — PANTOPRAZOLE SODIUM 20 MG/1
20 TABLET, DELAYED RELEASE ORAL DAILY
Qty: 90 TABLET | Refills: 3 | Status: SHIPPED | OUTPATIENT
Start: 2023-01-27

## 2023-01-27 RX ORDER — AMLODIPINE BESYLATE 10 MG/1
10 TABLET ORAL EVERY MORNING
Qty: 90 TABLET | Refills: 3 | Status: SHIPPED | OUTPATIENT
Start: 2023-01-27

## 2023-01-27 RX ORDER — ASPIRIN 81 MG/1
81 TABLET ORAL DAILY
Qty: 90 TABLET | Refills: 3 | Status: SHIPPED | OUTPATIENT
Start: 2023-01-27

## 2023-01-27 RX ORDER — ATORVASTATIN CALCIUM 40 MG/1
40 TABLET, FILM COATED ORAL
Qty: 90 TABLET | Refills: 3 | Status: SHIPPED | OUTPATIENT
Start: 2023-01-27

## 2023-01-27 RX ORDER — FERROUS SULFATE 325(65) MG
1 TABLET ORAL
Qty: 90 TABLET | Refills: 3 | Status: SHIPPED | OUTPATIENT
Start: 2023-01-27

## 2023-01-27 RX ORDER — LOSARTAN POTASSIUM 100 MG/1
100 TABLET ORAL DAILY
Qty: 90 TABLET | Refills: 3 | Status: SHIPPED | OUTPATIENT
Start: 2023-01-27

## 2023-01-27 RX ORDER — L.ACID/L.CASEI/B.BIF/B.LON/FOS 2B CELL-50
1 CAPSULE ORAL
Qty: 90 CAPSULE | Refills: 3 | Status: SHIPPED | OUTPATIENT
Start: 2023-01-27

## 2023-01-27 NOTE — PROGRESS NOTES
"Subjective:  Modesta Kaur is a 57 y.o. female who presents for     Hyperlipidemia; currently on atorvastatin 40 mg nightly, denies any issues medication, myalgia, yellowing of skin or eyes healing of skin or eyes, itchiness.  Has had history of CVA of unknown origin, with subsequent right hemiparesis with hand and foot weakness, on baby aspirin, Plavix 75 mg daily.  Denies any easy bleeding, easy bruising, hematochezia, hematemesis.    Hypertension; currently on amlodipine 10 mg daily, losartan 100 mg daily.  States that blood pressure has been well controlled.  Does not have any issues with medications. Denies any cardiac symptoms, chest pain, shortness of breath, headaches, vision changes, numbness, tingling, weakness, leg swelling, orthopnea, dyspnea on exertion.    Type 2 diabetes; currently on metformin 850 mg twice daily. Denies any issues with medication, states that blood glucose has been well controlled.  Denies any episodes of hypoglycemia, polyphagia, polydipsia, polyuria.  Last eye exam was 11 months ago, last foot exam was 1 year ago.      Patient Active Problem List   Diagnosis   • Type 2 diabetes mellitus without complication, without long-term current use of insulin (HCC)   • Mild intermittent asthma without complication   • Essential hypertension   • Seasonal allergic rhinitis due to pollen   • History of CVA (cerebrovascular accident)   • SHEYLA (obstructive sleep apnea)   • Class 2 severe obesity with serious comorbidity and body mass index (BMI) of 35.0 to 35.9 in adult ([facility])   • Right hemiparesis (HCC)   • Gastroesophageal reflux disease without esophagitis   • Hyperlipidemia   • Vitamin D deficiency   • Anemia     Vitals:    Vitals:    01/27/23 1254   BP: 100/72   BP Location: Left arm   Pulse: (!) 125   SpO2: 97%   Weight: 86 kg (189 lb 9.6 oz)   Height: 152.4 cm (60\")     Body mass index is 37.03 kg/m².      Current Outpatient Medications:   •  amLODIPine (NORVASC) 10 MG tablet, Take 1 tablet " by mouth Every Morning., Disp: 90 tablet, Rfl: 3  •  aspirin (Aspirin Low Dose) 81 MG EC tablet, Take 1 tablet by mouth Daily., Disp: 90 tablet, Rfl: 3  •  atorvastatin (LIPITOR) 40 MG tablet, Take 1 tablet by mouth every night at bedtime., Disp: 90 tablet, Rfl: 3  •  Blood Glucose Monitoring Suppl w/Device kit, Check blood sugar once daily for hyperglycemia, Disp: 1 each, Rfl: 0  •  clopidogrel (PLAVIX) 75 MG tablet, Take 1 tablet by mouth Daily., Disp: 90 tablet, Rfl: 3  •  ferrous sulfate (FeroSul) 325 (65 FE) MG tablet, Take 1 tablet by mouth Daily With Breakfast., Disp: 90 tablet, Rfl: 3  •  glucose blood test strip, Check blood sugar once daily for hyperglycemia, Disp: 50 each, Rfl: 12  •  Lancets 30G misc, 1 each Daily., Disp: 100 each, Rfl: 12  •  losartan (COZAAR) 100 MG tablet, Take 1 tablet by mouth Daily., Disp: 90 tablet, Rfl: 3  •  metFORMIN (Glucophage) 850 MG tablet, Take 1 tablet by mouth 2 (Two) Times a Day With Meals., Disp: 180 tablet, Rfl: 3  •  pantoprazole (PROTONIX) 20 MG EC tablet, Take 1 tablet by mouth Daily., Disp: 90 tablet, Rfl: 3  •  Probiotic Product (Probiotic Blend) capsule, Take 1 capsule by mouth Daily With Lunch., Disp: 90 capsule, Rfl: 3    Patient Active Problem List   Diagnosis   • Type 2 diabetes mellitus without complication, without long-term current use of insulin ([facility])   • Mild intermittent asthma without complication   • Essential hypertension   • Seasonal allergic rhinitis due to pollen   • History of CVA (cerebrovascular accident)   • SHEYLA (obstructive sleep apnea)   • Class 2 severe obesity with serious comorbidity and body mass index (BMI) of 35.0 to 35.9 in adult ([facility])   • Right hemiparesis ([facility])   • Gastroesophageal reflux disease without esophagitis   • Hyperlipidemia   • Vitamin D deficiency   • Anemia     Past Surgical History:   Procedure Laterality Date   • COLONOSCOPY       Social History     Socioeconomic History   • Marital status:    Tobacco Use   •  Smoking status: Never   • Smokeless tobacco: Never   Vaping Use   • Vaping Use: Never used   Substance and Sexual Activity   • Alcohol use: No   • Drug use: Not Currently   • Sexual activity: Yes     Partners: Male     Family History   Problem Relation Age of Onset   • Diabetes Other      Office Visit on 01/19/2023   Component Date Value Ref Range Status   • Hemoglobin A1C 01/19/2023 7.7  % Final   • Lot Number 01/19/2023 [id number]   Final   • Expiration Date 01/19/2023 03-   Final   Lab on 09/22/2022   Component Date Value Ref Range Status   • Glucose 09/22/2022 97  65 - 99 mg/dL Final   • BUN 09/22/2022 10  6 - 20 mg/dL Final   • Creatinine 09/22/2022 0.62  0.57 - 1.00 mg/dL Final   • Sodium 09/22/2022 142  136 - 145 mmol/L Final   • Potassium 09/22/2022 3.9  3.5 - 5.2 mmol/L Final   • Chloride 09/22/2022 104  98 - 107 mmol/L Final   • CO2 09/22/2022 24.0  22.0 - 29.0 mmol/L Final   • Calcium 09/22/2022 9.5  8.6 - 10.5 mg/dL Final   • Total Protein 09/22/2022 7.4  6.0 - 8.5 g/dL Final   • Albumin 09/22/2022 4.50  3.50 - 5.20 g/dL Final   • ALT (SGPT) 09/22/2022 24  1 - 33 U/L Final   • AST (SGOT) 09/22/2022 19  1 - 32 U/L Final   • Alkaline Phosphatase 09/22/2022 168 (H)  39 - 117 U/L Final   • Total Bilirubin 09/22/2022 0.7  0.0 - 1.2 mg/dL Final   • Globulin 09/22/2022 2.9  gm/dL Final   • A/G Ratio 09/22/2022 1.6  g/dL Final   • BUN/Creatinine Ratio 09/22/2022 16.1  7.0 - 25.0 Final   • Anion Gap 09/22/2022 14.0  5.0 - 15.0 mmol/L Final   • eGFR 09/22/2022 104.0  >60.0 mL/min/1.73 Final    National Kidney Foundation and American Society of Nephrology (ASN) Task Force recommended calculation based on the Chronic Kidney Disease Epidemiology Collaboration (CKD-EPI) equation refit without adjustment for race.   • Protime 09/22/2022 14.6  11.1 - 15.3 Seconds Final   • INR 09/22/2022 1.14  0.80 - 1.20 Final   • Hemoglobin A1C 09/22/2022 7.10 (H)  4.80 - 5.60 % Final   • Microalbumin, Urine 09/22/2022 1.8  mg/dL  Final   • Total Cholesterol 09/22/2022 127  0 - 200 mg/dL Final   • Triglycerides 09/22/2022 101  0 - 150 mg/dL Final   • HDL Cholesterol 09/22/2022 46  40 - 60 mg/dL Final   • LDL Cholesterol  09/22/2022 62  0 - 100 mg/dL Final   • VLDL Cholesterol 09/22/2022 19  5 - 40 mg/dL Final   • LDL/HDL Ratio 09/22/2022 1.32   Final   • Iron 09/22/2022 54  37 - 145 mcg/dL Final   • Iron Saturation 09/22/2022 15 (L)  20 - 50 % Final   • Transferrin 09/22/2022 240  200 - 360 mg/dL Final   • TIBC 09/22/2022 358  298 - 536 mcg/dL Final      XR Chest 1 View  Narrative: PORTABLE CHEST    HISTORY: CVA. Numbness.    Portable AP upright film of the chest was obtained at 3:15 PM.  COMPARISON: None    EKG leads.  The lungs are clear of an acute process.  The heart is not enlarged.  The pulmonary vasculature is not increased.  No pleural effusion.  No pneumothorax.  No acute osseous abnormality.  Impression: CONCLUSION:  No Acute Disease    [id number]    Electronically signed by:  Hema Blackwell MD  6/21/2018 3:46 PM CDT  Workstation: "[employer]"  CT Head Without Contrast Stroke Protocol  Narrative: CT Head Without Contrast    History: Upper lip numbness    Axial scans of the brain were obtained without intravenous  contrast.  Coronal and sagital reconstructions were preformed.    This exam was performed according to our departmental  dose-optimization program, which includes automated exposure  control, adjustment of the mA and/or kV according to patient size  and/or use of iterative reconstruction technique.    DLP: 939    Comparison: April 1, 2018    Bone windows are unremarkable.  The visualized paranasal sinuses are unremarkable.    Old basal ganglia lacunar infarcts, better delineated and more  extensive than the prior study.  Minimal to moderate periventricular white matter changes, more  extensive than prior study.  This could represent small vessel disease or possibly multiple  sclerosis.  Incidental cavum septum pellucidum et  vergae, a normal variant.  No hemorrhage.  No mass.  No abnormal areas of increased attenuation.  No midline shift.  No abnormal extra-axial fluid collections.  Impression: CONCLUSION:  Old basal ganglia lacunar infarcts, better delineated and more  extensive than the prior study.  Minimal to moderate periventricular white matter changes, more  extensive than prior study.  This could represent small vessel disease or possibly multiple  sclerosis.  Could consider MRI for further evaluation.    [id number]    Electronically signed by:  Hema Blackwell MD  6/21/2018 3:33 PM CDT  Workstation: [email or web address]      [unfilled]  Immunization History   Administered Date(s) Administered   • COVID-19 (MODERNA) 1st, 2nd, 3rd Dose Only 04/13/2021, 05/18/2021, 11/30/2021     The following portions of the patient's history were reviewed and updated as appropriate: allergies, current medications, past family history, past medical history, past social history, past surgical history and problem list.    PHQ-9 Total Score:           Physical Exam  Constitutional:       Appearance: Normal appearance.   HENT:      Head: Normocephalic and atraumatic.      Right Ear: External ear normal.      Left Ear: External ear normal.   Eyes:      General:         Right eye: No discharge.         Left eye: No discharge.      Conjunctiva/sclera: Conjunctivae normal.   Cardiovascular:      Rate and Rhythm: Normal rate and regular rhythm.      Pulses: Normal pulses.      Heart sounds: Normal heart sounds. No murmur heard.  Pulmonary:      Effort: Pulmonary effort is normal. No respiratory distress.      Breath sounds: Normal breath sounds.   Abdominal:      General: There is no distension.      Palpations: Abdomen is soft.      Tenderness: There is no abdominal tenderness.   Musculoskeletal:      Cervical back: Normal range of motion.      Right lower leg: No edema.      Left lower leg: No edema.      Comments: Paralysis of right hand, paralysis right foot.    Lymphadenopathy:      Cervical: No cervical adenopathy.   Neurological:      Mental Status: She is alert. Mental status is at baseline.   Psychiatric:         Mood and Affect: Mood normal.         Behavior: Behavior normal.         Assessment & Plan    Diagnosis Plan   1. Tachycardia  ECG 12 Lead    Ambulatory Referral to Cardiology      2. Type 2 diabetes mellitus with hyperglycemia, without long-term current use of insulin (HCC)  metFORMIN (Glucophage) 850 MG tablet      3. Essential hypertension  amLODIPine (NORVASC) 10 MG tablet    losartan (COZAAR) 100 MG tablet      4. Cervical cancer screening  Mammo Screening Bilateral With CAD      5. Encounter for screening mammogram for malignant neoplasm of breast  Ambulatory Referral to Obstetrics / Gynecology    Mammo Screening Bilateral With CAD      6. Cerebrovascular accident (CVA), unspecified mechanism (HCC)  aspirin (Aspirin Low Dose) 81 MG EC tablet    clopidogrel (PLAVIX) 75 MG tablet    Ambulatory Referral to Cardiology      7. Hyperlipidemia, unspecified hyperlipidemia type  atorvastatin (LIPITOR) 40 MG tablet      8. Anemia, unspecified type  ferrous sulfate (FeroSul) 325 (65 FE) MG tablet      9. Gastroesophageal reflux disease without esophagitis  pantoprazole (PROTONIX) 20 MG EC tablet    Probiotic Product (Probiotic Blend) capsule      10. Current use of long term anticoagulation           Orders Placed This Encounter   Procedures   • Mammo Screening Bilateral With CAD     Standing Status:   Future     Standing Expiration Date:   1/27/2024     Order Specific Question:   Reason for Exam:     Answer:   breast cancer screening   • Ambulatory Referral to Obstetrics / Gynecology     Referral Priority:   Routine     Referral Type:   Consultation     Referral Reason:   Specialty Services Required     Requested Specialty:   Obstetrics and Gynecology     Number of Visits Requested:   1   • Ambulatory Referral to Cardiology     Referral Priority:   Routine      Referral Type:   Consultation     Referral Reason:   Specialty Services Required     Requested Specialty:   Cardiology     Number of Visits Requested:   1   • ECG 12 Lead     Order Specific Question:   Reason for Exam:     Answer:   Tachycardia     Order Specific Question:   Release to patient     Answer:   Routine Release     Type 2 diabetes; denied issues with current current medications, latest A1c 7.7%, elevated.  Continue to encourage diet, exercise, weight loss, up-to-date  on eye and foot exams. On ACE/ARB.  After discussion with patient, will continue current management. Follow-up in 3 months or sooner if needed.    Hypertension; does not have issues with current medications, blood pressure has been well controlled.  Discussed importance of blood pressure control, risk of uncontrolled hypertension, continue to work on diet, exercise, weight loss.  After discussion, will continue current managemenr.  Follow-up with blood pressure log, given strict ER/cardiac precautions.      Of note patient was found to be tachycardic, EKG today reassuring, unclear etiology, after discussion patient, will refer to cardiology due to history of CVA with unknown etiology, and tachycardia.    Hyperlipidemia/history of CVA; previous labs reassuring, no issues with atorvastatin 40 mg daily, Plavix, aspirin.  Needs refills as above, will refill.  Exam, vitals reassuring.    Total time examining evaluating the patient, completing orders and counseling was 45 minutes.         This document has been electronically signed by Ronn Forte MD on January 27, 2023 13:48 CST    EMR Dragon/Transciption Disclaimer: Some of this note may be an electronic transcription/translation of spoken language to printed text.  The electronic translation of spoken language may permit erroneous, or at times, nonsensical words or phrases to be inadvertently transcribed. Although I have reviewed the note for such errors, some may still exist.

## 2023-02-02 LAB
QT INTERVAL: 346 MS
QTC INTERVAL: 459 MS

## 2023-03-16 ENCOUNTER — LAB (OUTPATIENT)
Dept: LAB | Facility: HOSPITAL | Age: 58
End: 2023-03-16
Payer: MEDICARE

## 2023-03-16 ENCOUNTER — OFFICE VISIT (OUTPATIENT)
Dept: CARDIOLOGY | Facility: CLINIC | Age: 58
End: 2023-03-16
Payer: MEDICARE

## 2023-03-16 VITALS
HEIGHT: 60 IN | SYSTOLIC BLOOD PRESSURE: 146 MMHG | WEIGHT: 193 LBS | BODY MASS INDEX: 37.89 KG/M2 | DIASTOLIC BLOOD PRESSURE: 82 MMHG | OXYGEN SATURATION: 100 % | HEART RATE: 94 BPM

## 2023-03-16 DIAGNOSIS — G47.33 OSA (OBSTRUCTIVE SLEEP APNEA): ICD-10-CM

## 2023-03-16 DIAGNOSIS — R00.0 TACHYCARDIA: ICD-10-CM

## 2023-03-16 DIAGNOSIS — G81.91 RIGHT HEMIPARESIS: ICD-10-CM

## 2023-03-16 DIAGNOSIS — R74.8 ALKALINE PHOSPHATASE ELEVATION: ICD-10-CM

## 2023-03-16 DIAGNOSIS — Z86.73 HISTORY OF CVA (CEREBROVASCULAR ACCIDENT): Primary | ICD-10-CM

## 2023-03-16 DIAGNOSIS — E11.9 TYPE 2 DIABETES MELLITUS WITHOUT COMPLICATION, WITHOUT LONG-TERM CURRENT USE OF INSULIN: ICD-10-CM

## 2023-03-16 DIAGNOSIS — K75.81 NASH (NONALCOHOLIC STEATOHEPATITIS): ICD-10-CM

## 2023-03-16 DIAGNOSIS — E66.01 CLASS 2 SEVERE OBESITY DUE TO EXCESS CALORIES WITH SERIOUS COMORBIDITY AND BODY MASS INDEX (BMI) OF 35.0 TO 35.9 IN ADULT: ICD-10-CM

## 2023-03-16 DIAGNOSIS — K74.00 HEPATIC FIBROSIS: ICD-10-CM

## 2023-03-16 DIAGNOSIS — R74.8 ELEVATED LIVER ENZYMES: ICD-10-CM

## 2023-03-16 DIAGNOSIS — R16.0 HEPATOMEGALY: ICD-10-CM

## 2023-03-16 DIAGNOSIS — E78.2 MIXED HYPERLIPIDEMIA: ICD-10-CM

## 2023-03-16 PROCEDURE — 84439 ASSAY OF FREE THYROXINE: CPT

## 2023-03-16 PROCEDURE — 3079F DIAST BP 80-89 MM HG: CPT | Performed by: INTERNAL MEDICINE

## 2023-03-16 PROCEDURE — 1160F RVW MEDS BY RX/DR IN RCRD: CPT | Performed by: INTERNAL MEDICINE

## 2023-03-16 PROCEDURE — 84443 ASSAY THYROID STIM HORMONE: CPT

## 2023-03-16 PROCEDURE — 80053 COMPREHEN METABOLIC PANEL: CPT

## 2023-03-16 PROCEDURE — 85025 COMPLETE CBC W/AUTO DIFF WBC: CPT

## 2023-03-16 PROCEDURE — 99204 OFFICE O/P NEW MOD 45 MIN: CPT | Performed by: INTERNAL MEDICINE

## 2023-03-16 PROCEDURE — 93000 ELECTROCARDIOGRAM COMPLETE: CPT | Performed by: INTERNAL MEDICINE

## 2023-03-16 PROCEDURE — 3077F SYST BP >= 140 MM HG: CPT | Performed by: INTERNAL MEDICINE

## 2023-03-16 PROCEDURE — 1159F MED LIST DOCD IN RCRD: CPT | Performed by: INTERNAL MEDICINE

## 2023-03-16 NOTE — PROGRESS NOTES
Modesta Kaur  57 y.o. female    03/16/2023  1. History of CVA (cerebrovascular accident)    2. Tachycardia    3. Right hemiparesis ([facility])    4. SHEYLA (obstructive sleep apnea)    5. Mixed hyperlipidemia    6. Type 2 diabetes mellitus without complication, without long-term current use of insulin ([facility])    7. Class 2 severe obesity due to excess calories with serious comorbidity and body mass index (BMI) of 35.0 to 35.9 in adult ([facility])        History of Present Illness  Modesta Kaur is a 57-year-old -American female who is being seen by me as referred by Dr. Forte.  When seen in the office on 1/27/2023 she was noted to have a heart rate of 125 bpm and a subsequent EKG performed showed sinus tachycardia with heart rate of 106 bpm, left axis deviation/LAFB and there was poor R wave progression in the anterior leads and questionable QS pattern in the inferior leads.  Her history was reviewed in detail.  The patient had a cerebrovascular event about 4 years prior that left her with a right-sided hemiparesis.  She has had blood pressure that has been difficult to control in the past and diabetes mellitus, sleep apnea, obesity.  She has no previous documented coronary artery disease or valvular heart disease.    Interestingly, the patient denies any chest pain, palpitation, dizziness, syncope and was unaware of increased heart rate.  She denies any fevers, chills, GI symptoms, and there is no history of any blood loss or chronic anemia.  Patient is a non-smoker and nondrinker.    EKG today showed sinus rhythm with heart rate of 94 bpm.  LAFB.  Poor R wave progression anteroseptal leads.    SUBJECTIVE    Allergies   Allergen Reactions   • Lisinopril    • Triamterene          Past Medical History:   Diagnosis Date   • Cellulitis of other sites - right foot, resolved    • Cerebrovascular accident (HCC)    • Essential hypertension    • Other specified local infections of the skin and subcutaneous tissue - right foot.     • Rash and other nonspecific skin eruption    • Right hemiparesis (HCC)    • Streptococcal sore throat    • URI (upper respiratory infection)          Past Surgical History:   Procedure Laterality Date   • COLONOSCOPY           Family History   Problem Relation Age of Onset   • Diabetes Other          Social History     Socioeconomic History   • Marital status:    Tobacco Use   • Smoking status: Never     Passive exposure: Current   • Smokeless tobacco: Never   Vaping Use   • Vaping Use: Never used   Substance and Sexual Activity   • Alcohol use: No   • Drug use: Not Currently   • Sexual activity: Yes     Partners: Male         Current Outpatient Medications   Medication Sig Dispense Refill   • amLODIPine (NORVASC) 10 MG tablet Take 1 tablet by mouth Every Morning. 90 tablet 3   • aspirin (Aspirin Low Dose) 81 MG EC tablet Take 1 tablet by mouth Daily. 90 tablet 3   • atorvastatin (LIPITOR) 40 MG tablet Take 1 tablet by mouth every night at bedtime. 90 tablet 3   • Blood Glucose Monitoring Suppl w/Device kit Check blood sugar once daily for hyperglycemia 1 each 0   • clopidogrel (PLAVIX) 75 MG tablet Take 1 tablet by mouth Daily. 90 tablet 3   • ferrous sulfate (FeroSul) 325 (65 FE) MG tablet Take 1 tablet by mouth Daily With Breakfast. 90 tablet 3   • glucose blood test strip Check blood sugar once daily for hyperglycemia 50 each 12   • Lancets 30G misc 1 each Daily. 100 each 12   • losartan (COZAAR) 100 MG tablet Take 1 tablet by mouth Daily. 90 tablet 3   • metFORMIN (Glucophage) 850 MG tablet Take 1 tablet by mouth 2 (Two) Times a Day With Meals. 180 tablet 3   • pantoprazole (PROTONIX) 20 MG EC tablet Take 1 tablet by mouth Daily. 90 tablet 3   • Probiotic Product (Probiotic Blend) capsule Take 1 capsule by mouth Daily With Lunch. 90 capsule 3     No current facility-administered medications for this visit.         OBJECTIVE    /82 (BP Location: Left arm, Patient Position: Sitting, Cuff Size:  "Adult)   Pulse 94   Ht 152.4 cm (60\")   Wt 87.5 kg (193 lb)   LMP  (LMP Unknown)   SpO2 100%   BMI 37.69 kg/m²         Review of Systems     Constitutional:  Denies recent weight loss, weight gain, fever or chills     HENT:  Denies any hearing loss, epistaxis, hoarseness, or difficulty speaking.     Eyes: Wears eyeglasses or contact lenses     Respiratory:  Denies dyspnea with exertion, no cough, wheezing, or hemoptysis.     Cardiovascular: Negative for palpitations, chest pain, orthopnea, PND, peripheral edema, syncope, or claudication.     Gastrointestinal:  Denies change in bowel habits, dyspepsia, ulcer disease, hematochezia, or melena.     Endocrine: Negative for cold intolerance, heat intolerance, polydipsia, polyphagia and polyuria.  Hypertension, hyperlipidemia    Genitourinary: Negative.      Musculoskeletal: Denies any history of arthritic symptoms or back problems     Skin:  Denies any change in hair or nails, rashes, or skin lesions.     Allergic/Immunologic: Negative.  Negative for environmental allergies, food allergies and/or immunocompromised state.     Neurological: History of CVA with right-sided hemiparesis    Hematological: Denies any food allergies, seasonal allergies, bleeding disorders, or lymphadenopathy.     Psychiatric/Behavioral: Denies any history of depression, substance abuse, or change in cognitive function.       Physical Exam     Constitutional: Cooperative, alert and oriented, in no acute distress.  BMI 38    HENT:   Head: Normocephalic, normal hair patterns, no masses or tenderness.  Ears, Nose, and Throat: No gross abnormalities. No pallor or cyanosis. Dentition good.   Eyes: EOMS intact, PERRL, conjunctivae and lids unremarkable. Fundoscopic exam and visual fields not performed.   Neck: No palpable masses or adenopathy, no thyromegaly, no JVD, carotid pulses are full and equal bilaterally and without bruit.     Cardiovascular: Regular rhythm, S1 and S2 normal, no S3 or S4.  " No murmurs, gallops, or rubs detected.     Pulmonary/Chest: Chest: normal symmetry, normal respiratory excursion, no intercostal retraction, no use of accessory muscles.     Pulmonary: Normal breath sounds. No rales or rhonchi.    Abdominal: Abdomen soft, bowel sounds normoactive, no masses, no hepatosplenomegaly, nontender, no bruit.     Musculoskeletal: No deformities, clubbing, cyanosis, erythema, or edema observed.     Neurological: Oriented to time, place.  Right-sided hemiparesis.    Skin: Warm and dry to the touch, no apparent skin lesion or mass noted.     Psychiatric: She has a normal mood and affect. Her behavior is normal. Judgment and thought content normal.         Procedures      Lab Results   Component Value Date    WBC 6.88 03/14/2022    HGB 14.0 03/14/2022    HCT 40.3 03/14/2022    MCV 87.0 03/14/2022     03/14/2022     Lab Results   Component Value Date    GLUCOSE 97 09/22/2022    BUN 10 09/22/2022    CREATININE 0.62 09/22/2022    EGFRIFAFRI 80 12/02/2021    BCR 16.1 09/22/2022    CO2 24.0 09/22/2022    CALCIUM 9.5 09/22/2022    ALBUMIN 4.50 09/22/2022    LABIL2 1.4 06/22/2018    AST 19 09/22/2022    ALT 24 09/22/2022     Lab Results   Component Value Date    CHOL 127 09/22/2022    CHOL 172 04/30/2021    CHOL 142 03/06/2020     Lab Results   Component Value Date    TRIG 101 09/22/2022    TRIG 199 (H) 03/14/2022    TRIG 193 (H) 04/30/2021     Lab Results   Component Value Date    HDL 46 09/22/2022    HDL 43 04/30/2021    HDL 59 03/06/2020     No components found for: LDLCALC  Lab Results   Component Value Date    LDL 62 09/22/2022    LDL 96 04/30/2021    LDL 64 03/06/2020     No results found for: HDLLDLRATIO  No components found for: CHOLHDL  Lab Results   Component Value Date    HGBA1C 7.7 01/19/2023     No results found for: TSH, A9QNATR, G8KDGMR, THYROIDAB        ASSESSMENT AND PLAN  Modesta Kaur is a 57-year-old female with multiple medical issues as discussed in the history of present  illness who has been referred for evaluation of tachycardia noted on EKG.  The exact etiology for this is unclear and the patient denied any cardiac symptoms.  EKG showed sinus tachycardia.  She does have poor R wave progression in the anteroseptal leads and questionable QS pattern in the inferior leads.    The plan would be to go ahead with a Holter monitor for 72 hours to study heart rate variability and rule out any paroxysmal arrhythmia in particular atrial fibrillation.  An echocardiogram to assess left ventricular and valvular function has been arranged in view of her EKG abnormalities and unexplained sinus tachycardia noted.    I have encouraged her to maintain a high fluid intake and no changes in medications been made at this time.  Have continued antiplatelet therapy with aspirin and Plavix, antihypertensive therapy with amlodipine, losartan and lipid-lowering therapy with atorvastatin has been continued.    We could consider starting her on metoprolol succinate 25 mg daily after looking at the results of the above tests.  Thank you for asked me to see this patient.    Diagnoses and all orders for this visit:    1. History of CVA (cerebrovascular accident) ([facility])  -     ECG 12 Lead    2. Tachycardia    3. Right hemiparesis ([facility])    4. SHEYLA (obstructive sleep apnea)    5. Mixed hyperlipidemia    6. Type 2 diabetes mellitus without complication, without long-term current use of insulin ([facility])    7. Class 2 severe obesity due to excess calories with serious comorbidity and body mass index (BMI) of 35.0 to 35.9 in adult ([facility])        Class 2 Severe Obesity (BMI >=35 and <=39.9). Obesity-related health conditions include the following: obstructive sleep apnea, hypertension and dyslipidemias. Obesity is unchanged. BMI is is above average; BMI management plan is completed. We discussed portion control and increasing exercise.      Modesta Kaur  reports that she has never smoked. She has been exposed to tobacco  smoke. She has never used smokeless tobacco.           Walter Rodgers MD  3/16/2023  11:19 CDT

## 2023-03-17 LAB
ALBUMIN SERPL-MCNC: 4.6 G/DL (ref 3.5–5.2)
ALBUMIN/GLOB SERPL: 1.4 G/DL
ALP SERPL-CCNC: 191 U/L (ref 39–117)
ALT SERPL W P-5'-P-CCNC: 37 U/L (ref 1–33)
ANION GAP SERPL CALCULATED.3IONS-SCNC: 10 MMOL/L (ref 5–15)
AST SERPL-CCNC: 26 U/L (ref 1–32)
BASOPHILS # BLD AUTO: 0.09 10*3/MM3 (ref 0–0.2)
BASOPHILS NFR BLD AUTO: 1 % (ref 0–1.5)
BILIRUB SERPL-MCNC: 0.5 MG/DL (ref 0–1.2)
BUN SERPL-MCNC: 13 MG/DL (ref 6–20)
BUN/CREAT SERPL: 17.1 (ref 7–25)
CALCIUM SPEC-SCNC: 9.3 MG/DL (ref 8.6–10.5)
CHLORIDE SERPL-SCNC: 104 MMOL/L (ref 98–107)
CO2 SERPL-SCNC: 26 MMOL/L (ref 22–29)
CREAT SERPL-MCNC: 0.76 MG/DL (ref 0.57–1)
DEPRECATED RDW RBC AUTO: 42.8 FL (ref 37–54)
EGFRCR SERPLBLD CKD-EPI 2021: 91 ML/MIN/1.73
EOSINOPHIL # BLD AUTO: 0.35 10*3/MM3 (ref 0–0.4)
EOSINOPHIL NFR BLD AUTO: 4 % (ref 0.3–6.2)
ERYTHROCYTE [DISTWIDTH] IN BLOOD BY AUTOMATED COUNT: 13.5 % (ref 12.3–15.4)
GLOBULIN UR ELPH-MCNC: 3.4 GM/DL
GLUCOSE SERPL-MCNC: 115 MG/DL (ref 65–99)
HCT VFR BLD AUTO: 39.5 % (ref 34–46.6)
HGB BLD-MCNC: 13.5 G/DL (ref 12–15.9)
IMM GRANULOCYTES # BLD AUTO: 0.07 10*3/MM3 (ref 0–0.05)
IMM GRANULOCYTES NFR BLD AUTO: 0.8 % (ref 0–0.5)
LYMPHOCYTES # BLD AUTO: 2.48 10*3/MM3 (ref 0.7–3.1)
LYMPHOCYTES NFR BLD AUTO: 28.2 % (ref 19.6–45.3)
MCH RBC QN AUTO: 29.7 PG (ref 26.6–33)
MCHC RBC AUTO-ENTMCNC: 34.2 G/DL (ref 31.5–35.7)
MCV RBC AUTO: 87 FL (ref 79–97)
MONOCYTES # BLD AUTO: 0.57 10*3/MM3 (ref 0.1–0.9)
MONOCYTES NFR BLD AUTO: 6.5 % (ref 5–12)
NEUTROPHILS NFR BLD AUTO: 5.23 10*3/MM3 (ref 1.7–7)
NEUTROPHILS NFR BLD AUTO: 59.5 % (ref 42.7–76)
NRBC BLD AUTO-RTO: 0 /100 WBC (ref 0–0.2)
PLATELET # BLD AUTO: 327 10*3/MM3 (ref 140–450)
PMV BLD AUTO: 11.9 FL (ref 6–12)
POTASSIUM SERPL-SCNC: 4.3 MMOL/L (ref 3.5–5.2)
PROT SERPL-MCNC: 8 G/DL (ref 6–8.5)
QT INTERVAL: 366 MS
QTC INTERVAL: 457 MS
RBC # BLD AUTO: 4.54 10*6/MM3 (ref 3.77–5.28)
SODIUM SERPL-SCNC: 140 MMOL/L (ref 136–145)
T4 FREE SERPL-MCNC: 1 NG/DL (ref 0.93–1.7)
TSH SERPL DL<=0.05 MIU/L-ACNC: 2.68 UIU/ML (ref 0.27–4.2)
WBC NRBC COR # BLD: 8.79 10*3/MM3 (ref 3.4–10.8)

## 2023-03-21 ENCOUNTER — OFFICE VISIT (OUTPATIENT)
Dept: GASTROENTEROLOGY | Facility: CLINIC | Age: 58
End: 2023-03-21
Payer: MEDICARE

## 2023-03-21 VITALS
WEIGHT: 193 LBS | OXYGEN SATURATION: 98 % | HEIGHT: 60 IN | HEART RATE: 98 BPM | SYSTOLIC BLOOD PRESSURE: 124 MMHG | BODY MASS INDEX: 37.89 KG/M2 | DIASTOLIC BLOOD PRESSURE: 76 MMHG

## 2023-03-21 DIAGNOSIS — R74.8 ALKALINE PHOSPHATASE ELEVATION: ICD-10-CM

## 2023-03-21 DIAGNOSIS — Z87.19 HISTORY OF COLITIS: ICD-10-CM

## 2023-03-21 DIAGNOSIS — E71.30 DISORDER OF FATTY-ACID METABOLISM, UNSPECIFIED: ICD-10-CM

## 2023-03-21 DIAGNOSIS — K75.81 NASH (NONALCOHOLIC STEATOHEPATITIS): Primary | ICD-10-CM

## 2023-03-21 DIAGNOSIS — K74.00 HEPATIC FIBROSIS: ICD-10-CM

## 2023-03-21 DIAGNOSIS — Z12.11 ENCOUNTER FOR SCREENING FOR MALIGNANT NEOPLASM OF COLON: ICD-10-CM

## 2023-03-21 PROCEDURE — 3074F SYST BP LT 130 MM HG: CPT | Performed by: PHYSICIAN ASSISTANT

## 2023-03-21 PROCEDURE — 99214 OFFICE O/P EST MOD 30 MIN: CPT | Performed by: PHYSICIAN ASSISTANT

## 2023-03-21 PROCEDURE — 1160F RVW MEDS BY RX/DR IN RCRD: CPT | Performed by: PHYSICIAN ASSISTANT

## 2023-03-21 PROCEDURE — 1159F MED LIST DOCD IN RCRD: CPT | Performed by: PHYSICIAN ASSISTANT

## 2023-03-21 PROCEDURE — 3078F DIAST BP <80 MM HG: CPT | Performed by: PHYSICIAN ASSISTANT

## 2023-03-21 RX ORDER — DEXTROSE AND SODIUM CHLORIDE 5; .45 G/100ML; G/100ML
30 INJECTION, SOLUTION INTRAVENOUS CONTINUOUS PRN
OUTPATIENT
Start: 2023-03-21

## 2023-03-21 NOTE — PROGRESS NOTES
Chief Complaint   Patient presents with   • Follow-up   • Heartburn   • Fatty Liver   • Elevated Hepatic Enzymes       ENDO PROCEDURE ORDERED: COLON screen, hx coltis    Subjective    Modesta Kaur is a 57 y.o. female. she is here today for follow-up.    History of Present Illness    Patient seen on a recheck of her GERD, elevated liver enzymes, fatty liver, F0/S3/N2. Last seen 09/20/2022. Patient currently denies significant abdominal pain. GERD is doing fairly well on the Protonix. Denied nausea, vomiting, dysphagia. Bowels are moving without blood or mucus. Weight is up 14 pounds since last visit. She has a history of colitis in 2018. She is due for colonoscopy. Last saw Dr. Agarwal, her cardiologist, 03/16/2023. Had laboratory 09/22/2022. CMP showed alkaline phosphatase 168, otherwise normal. INR 1.14. A1c 7.1%. Normal cholesterol panel. Iron 54 with 15% saturation. Laboratory 01/19/2023: A1c 7.7%. CBC normal. TSH, T4 normal. CMP showed glucose 115, alkaline phosphatase 191, ALT 37, otherwise normal.    ASSESSMENT/PLAN: Patient with elevated liver enzymes. Encouraged to continue dietary modification and significant weight loss, avoiding fructose, continued aggressive treatment of blood sugar and cholesterol. Scheduled for a screening colonoscopy with history of colitis. Will recheck MENDOZA FibroSURE, AFP, INR, alkaline phosphatase, isoenzymes prior to next appointment, further pending clinical course and the results of the above.       The following portions of the patient's history were reviewed and updated as appropriate:   Past Medical History:   Diagnosis Date   • Cellulitis of other sites - right foot, resolved    • Cerebrovascular accident (HCC)    • Essential hypertension    • Other specified local infections of the skin and subcutaneous tissue - right foot.    • Rash and other nonspecific skin eruption    • Right hemiparesis (HCC)    • Streptococcal sore throat    • URI (upper respiratory infection)       Past Surgical History:   Procedure Laterality Date   • COLONOSCOPY       Family History   Problem Relation Age of Onset   • Diabetes Other      OB History    No obstetric history on file.       Allergies   Allergen Reactions   • Lisinopril    • Triamterene      Social History     Socioeconomic History   • Marital status:    Tobacco Use   • Smoking status: Never     Passive exposure: Current   • Smokeless tobacco: Never   Vaping Use   • Vaping Use: Never used   Substance and Sexual Activity   • Alcohol use: No   • Drug use: Not Currently   • Sexual activity: Yes     Partners: Male     Current Medications:  Prior to Admission medications    Medication Sig Start Date End Date Taking? Authorizing Provider   amLODIPine (NORVASC) 10 MG tablet Take 1 tablet by mouth Every Morning. 1/27/23  Yes Ronn Forte MD   aspirin (Aspirin Low Dose) 81 MG EC tablet Take 1 tablet by mouth Daily. 1/27/23  Yes Ronn Forte MD   atorvastatin (LIPITOR) 40 MG tablet Take 1 tablet by mouth every night at bedtime. 1/27/23  Yes Ronn Forte MD   Blood Glucose Monitoring Suppl w/Device kit Check blood sugar once daily for hyperglycemia 1/19/23  Yes Butch Atkinson APRN   clopidogrel (PLAVIX) 75 MG tablet Take 1 tablet by mouth Daily. 1/27/23  Yes Ronn Forte MD   ferrous sulfate (FeroSul) 325 (65 FE) MG tablet Take 1 tablet by mouth Daily With Breakfast. 1/27/23  Yes Ronn Forte MD   glucose blood test strip Check blood sugar once daily for hyperglycemia 1/19/23  Yes Butch Atkinson APRN   Lancets 30G misc 1 each Daily. 1/19/23  Yes Butch Atkinson APRN   losartan (COZAAR) 100 MG tablet Take 1 tablet by mouth Daily. 1/27/23  Yes Ronn Forte MD   metFORMIN (Glucophage) 850 MG tablet Take 1 tablet by mouth 2 (Two) Times a Day With Meals. 1/27/23  Yes Ronn Forte MD   pantoprazole (PROTONIX) 20 MG EC tablet Take 1 tablet by mouth Daily. 1/27/23  Yes Ronn Forte MD   Probiotic Product (Probiotic  "Blend) capsule Take 1 capsule by mouth Daily With Lunch. 1/27/23  Yes Ronn Forte MD     Review of Systems  Review of Systems   Constitutional: Negative for unexpected weight change.   HENT: Negative for trouble swallowing.    Gastrointestinal: Negative for abdominal distention, abdominal pain, anal bleeding, blood in stool and rectal pain.          Objective    /76 (BP Location: Left arm, Patient Position: Sitting, Cuff Size: Adult)   Pulse 98   Ht 152.4 cm (60\")   Wt 87.5 kg (193 lb)   LMP  (LMP Unknown)   SpO2 98%   BMI 37.69 kg/m²   Physical Exam  Vitals and nursing note reviewed.   Constitutional:       General: She is not in acute distress.     Appearance: She is well-developed.      Comments: AA   HENT:      Head: Normocephalic and atraumatic.   Eyes:      Pupils: Pupils are equal, round, and reactive to light.   Cardiovascular:      Rate and Rhythm: Normal rate and regular rhythm.      Heart sounds: Normal heart sounds.   Pulmonary:      Effort: Pulmonary effort is normal.      Breath sounds: Normal breath sounds.   Abdominal:      General: Bowel sounds are normal. There is no distension or abdominal bruit.      Palpations: Abdomen is soft. Abdomen is not rigid. There is no shifting dullness or mass.      Tenderness: There is abdominal tenderness. There is no guarding or rebound.      Hernia: No hernia is present. There is no hernia in the ventral area.   Musculoskeletal:         General: Normal range of motion.      Cervical back: Normal range of motion.   Skin:     General: Skin is warm and dry.   Neurological:      Mental Status: She is alert and oriented to person, place, and time.   Psychiatric:         Behavior: Behavior normal.         Thought Content: Thought content normal.         Judgment: Judgment normal.       Assessment & Plan      1. MENDOZA (nonalcoholic steatohepatitis)    2. Hepatic fibrosis    3. Alkaline phosphatase elevation    4. Encounter for screening for malignant " neoplasm of colon    5. History of colitis    6. Disorder of fatty-acid metabolism, unspecified    .   Diagnoses and all orders for this visit:    1. MENDOZA (nonalcoholic steatohepatitis) (Primary)  -     MENDOZA Fibrosure  -     Protime-INR  -     AFP Tumor Marker  -     Alkaline Phosphatase, Isoenzymes    2. Hepatic fibrosis  -     MENDOZA Fibrosure  -     Protime-INR  -     AFP Tumor Marker  -     Alkaline Phosphatase, Isoenzymes    3. Alkaline phosphatase elevation  -     MENDOZA Fibrosure  -     Protime-INR  -     AFP Tumor Marker  -     Alkaline Phosphatase, Isoenzymes    4. Encounter for screening for malignant neoplasm of colon  -     Case Request; Standing  -     dextrose 5 % and sodium chloride 0.45 % infusion  -     Case Request    5. History of colitis  -     Case Request; Standing  -     dextrose 5 % and sodium chloride 0.45 % infusion  -     Case Request    6. Disorder of fatty-acid metabolism, unspecified  -     MENDOZA Fibrosure    Other orders  -     Follow Anesthesia Guidelines / Protocol; Future  -     Obtain Informed Consent; Future  -     Obtain Informed Consent; Standing  -     POC Glucose Once; Standing  -     polyethylene glycol (GoLYTELY) 236 g solution; Please use Instructions as given in Office  Dispense: 4000 mL; Refill: 0        Orders placed during this encounter include:  Orders Placed This Encounter   Procedures   • MENDOZA Fibrosure     Order Specific Question:   Release to patient     Answer:   Routine Release   • Protime-INR   • AFP Tumor Marker     Order Specific Question:   Release to patient     Answer:   Routine Release   • Alkaline Phosphatase, Isoenzymes     Order Specific Question:   Release to patient     Answer:   Routine Release   • Obtain Informed Consent     Standing Status:   Future     Order Specific Question:   Informed Consent Given For     Answer:   COLONOSCOPY       Medications prescribed:  New Medications Ordered This Visit   Medications   • polyethylene glycol (GoLYTELY) 236 g  solution     Sig: Please use Instructions as given in Office     Dispense:  4000 mL     Refill:  0       Requested Prescriptions     Signed Prescriptions Disp Refills   • polyethylene glycol (GoLYTELY) 236 g solution 4000 mL 0     Sig: Please use Instructions as given in Office       Review and/or summary of lab tests, radiology, procedures, medications. Review and summary of old records and obtaining of history. The risks and benefits of my recommendations, as well as other treatment options were discussed with the patient today. Questions were answered.    Follow-up: Return in about 6 weeks (around 5/2/2023), or if symptoms worsen or fail to improve, for lab prior.     COLONOSCOPY (N/A)      This document has been electronically signed by Artemio Rosenberg PA-C on March 31, 2023 14:54 CDT      Results for orders placed or performed in visit on 03/16/23   CBC Auto Differential    Specimen: Blood   Result Value Ref Range    WBC 8.79 3.40 - 10.80 10*3/mm3    RBC 4.54 3.77 - 5.28 10*6/mm3    Hemoglobin 13.5 12.0 - 15.9 g/dL    Hematocrit 39.5 34.0 - 46.6 %    MCV 87.0 79.0 - 97.0 fL    MCH 29.7 26.6 - 33.0 pg    MCHC 34.2 31.5 - 35.7 g/dL    RDW 13.5 12.3 - 15.4 %    RDW-SD 42.8 37.0 - 54.0 fl    MPV 11.9 6.0 - 12.0 fL    Platelets 327 140 - 450 10*3/mm3    Neutrophil % 59.5 42.7 - 76.0 %    Lymphocyte % 28.2 19.6 - 45.3 %    Monocyte % 6.5 5.0 - 12.0 %    Eosinophil % 4.0 0.3 - 6.2 %    Basophil % 1.0 0.0 - 1.5 %    Immature Grans % 0.8 (H) 0.0 - 0.5 %    Neutrophils, Absolute 5.23 1.70 - 7.00 10*3/mm3    Lymphocytes, Absolute 2.48 0.70 - 3.10 10*3/mm3    Monocytes, Absolute 0.57 0.10 - 0.90 10*3/mm3    Eosinophils, Absolute 0.35 0.00 - 0.40 10*3/mm3    Basophils, Absolute 0.09 0.00 - 0.20 10*3/mm3    Immature Grans, Absolute 0.07 (H) 0.00 - 0.05 10*3/mm3    nRBC 0.0 0.0 - 0.2 /100 WBC   TSH    Specimen: Blood   Result Value Ref Range    TSH 2.680 0.270 - 4.200 uIU/mL   T4, Free    Specimen: Blood   Result Value Ref  Range    Free T4 1.00 0.93 - 1.70 ng/dL   Comprehensive Metabolic Panel    Specimen: Blood   Result Value Ref Range    Glucose 115 (H) 65 - 99 mg/dL    BUN 13 6 - 20 mg/dL    Creatinine 0.76 0.57 - 1.00 mg/dL    Sodium 140 136 - 145 mmol/L    Potassium 4.3 3.5 - 5.2 mmol/L    Chloride 104 98 - 107 mmol/L    CO2 26.0 22.0 - 29.0 mmol/L    Calcium 9.3 8.6 - 10.5 mg/dL    Total Protein 8.0 6.0 - 8.5 g/dL    Albumin 4.6 3.5 - 5.2 g/dL    ALT (SGPT) 37 (H) 1 - 33 U/L    AST (SGOT) 26 1 - 32 U/L    Alkaline Phosphatase 191 (H) 39 - 117 U/L    Total Bilirubin 0.5 0.0 - 1.2 mg/dL    Globulin 3.4 gm/dL    A/G Ratio 1.4 g/dL    BUN/Creatinine Ratio 17.1 7.0 - 25.0    Anion Gap 10.0 5.0 - 15.0 mmol/L    eGFR 91.0 >60.0 mL/min/1.73   Results for orders placed or performed in visit on 03/16/23   Mobile Cardiac Outpatient Telemetry   Result Value Ref Range    Target HR (85%) 138 bpm    Max. Pred. HR (100%) 162 bpm   Results for orders placed or performed in visit on 03/16/23   ECG 12 Lead   Result Value Ref Range    QT Interval 366 ms    QTC Interval 457 ms   Results for orders placed or performed in visit on 01/27/23   ECG 12 Lead   Result Value Ref Range    QT Interval 346 ms    QTC Interval 459 ms   Results for orders placed or performed in visit on 01/19/23   POC Glycosylated Hemoglobin (Hb A1C)    Specimen: Blood   Result Value Ref Range    Hemoglobin A1C 7.7 %    Lot Number [id number]     Expiration Date 03-    Results for orders placed or performed in visit on 09/22/22   Iron and TIBC    Specimen: Blood   Result Value Ref Range    Iron 54 37 - 145 mcg/dL    Iron Saturation 15 (L) 20 - 50 %    Transferrin 240 200 - 360 mg/dL    TIBC 358 298 - 536 mcg/dL   MicroAlbumin, Urine, Random - Urine, Clean Catch    Specimen: Urine, Clean Catch   Result Value Ref Range    Microalbumin, Urine 1.8 mg/dL   Protime-INR    Specimen: Blood   Result Value Ref Range    Protime 14.6 11.1 - 15.3 Seconds    INR 1.14 0.80 - 1.20   Hemoglobin  A1c    Specimen: Blood   Result Value Ref Range    Hemoglobin A1C 7.10 (H) 4.80 - 5.60 %   Lipid panel    Specimen: Blood   Result Value Ref Range    Total Cholesterol 127 0 - 200 mg/dL    Triglycerides 101 0 - 150 mg/dL    HDL Cholesterol 46 40 - 60 mg/dL    LDL Cholesterol  62 0 - 100 mg/dL    VLDL Cholesterol 19 5 - 40 mg/dL    LDL/HDL Ratio 1.32    Comprehensive Metabolic Panel    Specimen: Blood   Result Value Ref Range    Glucose 97 65 - 99 mg/dL    BUN 10 6 - 20 mg/dL    Creatinine 0.62 0.57 - 1.00 mg/dL    Sodium 142 136 - 145 mmol/L    Potassium 3.9 3.5 - 5.2 mmol/L    Chloride 104 98 - 107 mmol/L    CO2 24.0 22.0 - 29.0 mmol/L    Calcium 9.5 8.6 - 10.5 mg/dL    Total Protein 7.4 6.0 - 8.5 g/dL    Albumin 4.50 3.50 - 5.20 g/dL    ALT (SGPT) 24 1 - 33 U/L    AST (SGOT) 19 1 - 32 U/L    Alkaline Phosphatase 168 (H) 39 - 117 U/L    Total Bilirubin 0.7 0.0 - 1.2 mg/dL    Globulin 2.9 gm/dL    A/G Ratio 1.6 g/dL    BUN/Creatinine Ratio 16.1 7.0 - 25.0    Anion Gap 14.0 5.0 - 15.0 mmol/L    eGFR 104.0 >60.0 mL/min/1.73   Results for orders placed or performed in visit on 02/22/22   MENDOZA Fibrosure    Specimen: Blood   Result Value Ref Range    Fibrosis Score (References) 0.20 0.00 - 0.21    Fibrosis Stage (Reference) Comment     Steatosis Score (Reference) 0.74 (H) 0.00 - 0.30    Steatosis Grade (Reference) Comment     MENDOZA Score (Reference) 0.75 (H) 0.25    Mendoza Grade (Reference) Comment     Height: (Reference) 60 in    Weight: (Reference) 183 LBS    Alpha 2-Macroglobulins, Qn 140 110 - 276 mg/dL    Haptoglobin 83 33 - 346 mg/dL    Apolipoprotein A-1 156 116 - 209 mg/dL    Total Bilirubin 0.6 0.0 - 1.2 mg/dL    GGT 65 (H) 0 - 60 IU/L    ALT (SGPT) 22 0 - 40 IU/L    AST (SGOT) P5P (Reference) 20 0 - 40 IU/L    Cholesterol, Total (Reference) 139 100 - 199 mg/dL    Glucose, Serum (Reference) 131 (H) 65 - 99 mg/dL    Triglycerides 199 (H) 0 - 149 mg/dL    Interpretations: (Reference) Comment     Fibrosis Scoring:  Comment     Steatosis Grading (Reference) Comment     López Scoring (Reference) Comment     Limitations: (Reference) Comment     Comment (Reference) Comment      *Note: Due to a large number of results and/or encounters for the requested time period, some results have not been displayed. A complete set of results can be found in Results Review.

## 2023-03-23 ENCOUNTER — TELEPHONE (OUTPATIENT)
Dept: CARDIOLOGY | Facility: CLINIC | Age: 58
End: 2023-03-23
Payer: MEDICARE

## 2023-03-23 NOTE — TELEPHONE ENCOUNTER
Notified and understood   Per Dr Agarwal----- Message from Walter Rodgers MD sent at 3/21/2023  7:32 PM CDT -----  Thyroid function studies within normal limits    ----- Message -----  From: Lab, Background User  Sent: 3/17/2023   7:53 PM CDT  To: Walter Rodgers MD

## 2023-03-27 ENCOUNTER — TELEPHONE (OUTPATIENT)
Dept: CARDIOLOGY | Facility: CLINIC | Age: 58
End: 2023-03-27
Payer: MEDICARE

## 2023-03-27 NOTE — TELEPHONE ENCOUNTER
Called pt no answer left message to call to office ----- Message from Walter Rodgers MD sent at 3/24/2023  4:21 PM CDT -----  Old monitor shows essentially sinus rhythm with rare episodes of sinus tachycardia.  No supraventricular or ventricular arrhythmias noted.  ----- Message -----  From: Walter Rodgers MD  Sent: 3/24/2023   8:06 AM CDT  To: Walter Rodgers MD

## 2023-04-24 ENCOUNTER — ANESTHESIA EVENT (OUTPATIENT)
Dept: GASTROENTEROLOGY | Facility: HOSPITAL | Age: 58
End: 2023-04-24
Payer: MEDICARE

## 2023-04-24 ENCOUNTER — HOSPITAL ENCOUNTER (OUTPATIENT)
Facility: HOSPITAL | Age: 58
Setting detail: HOSPITAL OUTPATIENT SURGERY
Discharge: HOME | End: 2023-04-24
Attending: INTERNAL MEDICINE | Admitting: INTERNAL MEDICINE
Payer: MEDICARE

## 2023-04-24 ENCOUNTER — ANESTHESIA (OUTPATIENT)
Dept: GASTROENTEROLOGY | Facility: HOSPITAL | Age: 58
End: 2023-04-24
Payer: MEDICARE

## 2023-04-24 VITALS
RESPIRATION RATE: 18 BRPM | WEIGHT: 189 LBS | HEART RATE: 75 BPM | SYSTOLIC BLOOD PRESSURE: 114 MMHG | OXYGEN SATURATION: 97 % | DIASTOLIC BLOOD PRESSURE: 75 MMHG | BODY MASS INDEX: 37.11 KG/M2 | TEMPERATURE: 96 F | HEIGHT: 60 IN

## 2023-04-24 DIAGNOSIS — Z87.19 HISTORY OF COLITIS: ICD-10-CM

## 2023-04-24 DIAGNOSIS — Z12.11 ENCOUNTER FOR SCREENING FOR MALIGNANT NEOPLASM OF COLON: ICD-10-CM

## 2023-04-24 PROCEDURE — G0121 COLON CA SCRN NOT HI RSK IND: HCPCS | Performed by: INTERNAL MEDICINE

## 2023-04-24 PROCEDURE — 25010000002 PROPOFOL 10 MG/ML EMULSION: Performed by: NURSE ANESTHETIST, CERTIFIED REGISTERED

## 2023-04-24 RX ORDER — PROPOFOL 10 MG/ML
VIAL (ML) INTRAVENOUS AS NEEDED
Status: DISCONTINUED | OUTPATIENT
Start: 2023-04-24 | End: 2023-04-24 | Stop reason: SURG

## 2023-04-24 RX ORDER — DEXTROSE AND SODIUM CHLORIDE 5; .45 G/100ML; G/100ML
30 INJECTION, SOLUTION INTRAVENOUS CONTINUOUS PRN
Status: DISCONTINUED | OUTPATIENT
Start: 2023-04-24 | End: 2023-04-24 | Stop reason: HOSPADM

## 2023-04-24 RX ORDER — LIDOCAINE HYDROCHLORIDE 20 MG/ML
INJECTION, SOLUTION INTRAVENOUS AS NEEDED
Status: DISCONTINUED | OUTPATIENT
Start: 2023-04-24 | End: 2023-04-24 | Stop reason: SURG

## 2023-04-24 RX ADMIN — DEXTROSE AND SODIUM CHLORIDE 30 ML/HR: 5; 450 INJECTION, SOLUTION INTRAVENOUS at 12:29

## 2023-04-24 RX ADMIN — LIDOCAINE HYDROCHLORIDE 60 MG: 20 INJECTION, SOLUTION INTRAVENOUS at 13:21

## 2023-04-24 RX ADMIN — PROPOFOL 20 MG: 10 INJECTION, EMULSION INTRAVENOUS at 13:27

## 2023-04-24 RX ADMIN — PROPOFOL 20 MG: 10 INJECTION, EMULSION INTRAVENOUS at 13:25

## 2023-04-24 RX ADMIN — PROPOFOL 50 MG: 10 INJECTION, EMULSION INTRAVENOUS at 13:21

## 2023-04-24 RX ADMIN — PROPOFOL 20 MG: 10 INJECTION, EMULSION INTRAVENOUS at 13:23

## 2023-04-24 NOTE — ANESTHESIA POSTPROCEDURE EVALUATION
Patient: Modesta Kaur    Procedure Summary     Date: 04/24/23 Room / Location: [facility] ENDOSCOPY 1 / [facility] ENDOSCOPY    Anesthesia Start: 1320 Anesthesia Stop: 1334    Procedure: COLONOSCOPY Diagnosis:       Encounter for screening for malignant neoplasm of colon      History of colitis      (Encounter for screening for malignant neoplasm of colon [Z12.11])      (History of colitis [Z87.19])    Surgeons: Homer Mancilla MD Provider: Celso Chun CRNA    Anesthesia Type: general ASA Status: 3          Anesthesia Type: general    Vitals  No vitals data found for the desired time range.          Post Anesthesia Care and Evaluation    Patient location during evaluation: PHASE II  Patient participation: complete - patient participated  Level of consciousness: sleepy but conscious  Pain score: 0  Pain management: adequate    Airway patency: patent  Anesthetic complications: No anesthetic complications  PONV Status: none  Cardiovascular status: acceptable  Respiratory status: acceptable  Hydration status: acceptable    Comments: HR 70, bp 108/66, rr 16, o2 sats 99%  No anesthesia care post op

## 2023-04-24 NOTE — ANESTHESIA PREPROCEDURE EVALUATION
Anesthesia Evaluation     Patient summary reviewed   NPO Solid Status: > 8 hours  NPO Liquid Status: > 2 hours           Airway   Mallampati: II  TM distance: >3 FB  Neck ROM: full  Possible difficult intubation  Dental      Pulmonary - normal exam   (+) asthma,sleep apnea,   Cardiovascular - normal exam    (+) hypertension, hyperlipidemia,     ROS comment: 3/16/2023    Test Reason : CVA/tachycardia  Blood Pressure :   */*   mmHG  Vent. Rate :  94 BPM     Atrial Rate :  94 BPM     P-R Int : 170 ms          QRS Dur :  78 ms      QT Int : 366 ms       P-R-T Axes :  53 -42  25 degrees     QTc Int : 457 ms     Normal sinus rhythm  Left axis deviation  poor R wave progression V1 to V3  Abnormal ECG  When compared with ECG of 27-JAN-2023 14:01,  Criteria for Inferior infarct are no longer Present    Neuro/Psych  (+) CVA residual symptoms,      ROS Comment: CVA 4 years ago, right side weakness. Uses a isbell to ambulate some.   GI/Hepatic/Renal/Endo    (+) obesity,  GERD,  diabetes mellitus poorly controlled,     Musculoskeletal     Abdominal   (+) obese,    Substance History      OB/GYN          Other                        Anesthesia Plan    ASA 3     general   total IV anesthesia  intravenous induction     Anesthetic plan, risks, benefits, and alternatives have been provided, discussed and informed consent has been obtained with: patient.        CODE STATUS:

## 2023-04-24 NOTE — H&P
No chief complaint on file.      ENDO PROCEDURE ORDERED: COLON screen, hx coltis    Subjective    Modesta Kaur is a 57 y.o. female. she is here today for follow-up.    History of Present Illness  I agree with the current note with no changes in the history.  Risks and benefits discussed with patient. Patient understands these and would like to proceed with procedure.    Patient seen on a recheck of her GERD, elevated liver enzymes, fatty liver, F0/S3/N2. Last seen 09/20/2022. Patient currently denies significant abdominal pain. GERD is doing fairly well on the Protonix. Denied nausea, vomiting, dysphagia. Bowels are moving without blood or mucus. Weight is up 14 pounds since last visit. She has a history of colitis in 2018. She is due for colonoscopy. Last saw Dr. Agarwal, her cardiologist, 03/16/2023. Had laboratory 09/22/2022. CMP showed alkaline phosphatase 168, otherwise normal. INR 1.14. A1c 7.1%. Normal cholesterol panel. Iron 54 with 15% saturation. Laboratory 01/19/2023: A1c 7.7%. CBC normal. TSH, T4 normal. CMP showed glucose 115, alkaline phosphatase 191, ALT 37, otherwise normal.    ASSESSMENT/PLAN: Patient with elevated liver enzymes. Encouraged to continue dietary modification and significant weight loss, avoiding fructose, continued aggressive treatment of blood sugar and cholesterol. Scheduled for a screening colonoscopy with history of colitis. Will recheck MENDOZA FibroSURE, AFP, INR, alkaline phosphatase, isoenzymes prior to next appointment, further pending clinical course and the results of the above.       The following portions of the patient's history were reviewed and updated as appropriate:   Past Medical History:   Diagnosis Date   • Cellulitis of other sites - right foot, resolved    • Cerebrovascular accident    • Diabetes mellitus    • Essential hypertension    • Other specified local infections of the skin and subcutaneous tissue - right foot.    • Rash and other nonspecific skin eruption     • Right hemiparesis    • Streptococcal sore throat    • URI (upper respiratory infection)      Past Surgical History:   Procedure Laterality Date   •  SECTION     • COLONOSCOPY       Family History   Problem Relation Age of Onset   • Diabetes Other      OB History    No obstetric history on file.       Allergies   Allergen Reactions   • Lisinopril Hives   • Triamterene Hives     Social History     Socioeconomic History   • Marital status:    Tobacco Use   • Smoking status: Never     Passive exposure: Current   • Smokeless tobacco: Never   Vaping Use   • Vaping Use: Never used   Substance and Sexual Activity   • Alcohol use: No   • Drug use: Not Currently   • Sexual activity: Defer     Partners: Male     Current Medications:  Prior to Admission medications    Medication Sig Start Date End Date Taking? Authorizing Provider   amLODIPine (NORVASC) 10 MG tablet Take 1 tablet by mouth Every Morning. 23  Yes Ronn Forte MD   aspirin (Aspirin Low Dose) 81 MG EC tablet Take 1 tablet by mouth Daily. 23  Yes Ronn Forte MD   atorvastatin (LIPITOR) 40 MG tablet Take 1 tablet by mouth every night at bedtime. 23  Yes Ronn Forte MD   Blood Glucose Monitoring Suppl w/Device kit Check blood sugar once daily for hyperglycemia 23  Yes Butch Atkinson APRN   clopidogrel (PLAVIX) 75 MG tablet Take 1 tablet by mouth Daily. 23  Yes Ronn Forte MD   ferrous sulfate (FeroSul) 325 (65 FE) MG tablet Take 1 tablet by mouth Daily With Breakfast. 23  Yes Ronn Forte MD   glucose blood test strip Check blood sugar once daily for hyperglycemia 23  Yes Butch Atkinson APRN   Lancets 30G misc 1 each Daily. 23  Yes Butch Atkinson APRN   losartan (COZAAR) 100 MG tablet Take 1 tablet by mouth Daily. 23  Yes Ronn Forte MD   metFORMIN (Glucophage) 850 MG tablet Take 1 tablet by mouth 2 (Two) Times a Day With Meals. 23  Yes Ronn Forte MD  "  pantoprazole (PROTONIX) 20 MG EC tablet Take 1 tablet by mouth Daily. 1/27/23  Yes Ronn Forte MD   Probiotic Product (Probiotic Blend) capsule Take 1 capsule by mouth Daily With Lunch. 1/27/23  Yes Ronn Forte MD     Review of Systems  Review of Systems   Constitutional: Negative for unexpected weight change.   HENT: Negative for trouble swallowing.    Gastrointestinal: Negative for abdominal distention, abdominal pain, anal bleeding, blood in stool and rectal pain.          Objective    Ht 152.4 cm (60\")   Wt 87.5 kg (193 lb)   LMP  (LMP Unknown)   BMI 37.69 kg/m²  /75 (BP Location: Left arm, Patient Position: Lying)   Pulse 75   Temp 96 °F (35.6 °C) (Tympanic)   Resp 18   Ht 152.4 cm (60\")   Wt 85.7 kg (189 lb)   LMP  (LMP Unknown)   SpO2 97%   BMI 36.91 kg/m²     Physical Exam  Vitals and nursing note reviewed.   Constitutional:       General: She is not in acute distress.     Appearance: She is well-developed.      Comments: AA   HENT:      Head: Normocephalic and atraumatic.   Eyes:      Pupils: Pupils are equal, round, and reactive to light.   Cardiovascular:      Rate and Rhythm: Normal rate and regular rhythm.      Heart sounds: Normal heart sounds.   Pulmonary:      Effort: Pulmonary effort is normal.      Breath sounds: Normal breath sounds.   Abdominal:      General: Bowel sounds are normal. There is no distension or abdominal bruit.      Palpations: Abdomen is soft. Abdomen is not rigid. There is no shifting dullness or mass.      Tenderness: There is abdominal tenderness. There is no guarding or rebound.      Hernia: No hernia is present. There is no hernia in the ventral area.   Musculoskeletal:         General: Normal range of motion.      Cervical back: Normal range of motion.   Skin:     General: Skin is warm and dry.   Neurological:      Mental Status: She is alert and oriented to person, place, and time.   Psychiatric:         Behavior: Behavior normal.         " Thought Content: Thought content normal.         Judgment: Judgment normal.       Assessment & Plan      1. Encounter for screening for malignant neoplasm of colon    2. History of colitis    .   Diagnoses and all orders for this visit:    1. Encounter for screening for malignant neoplasm of colon  -     dextrose 5 % and sodium chloride 0.45 % infusion    2. History of colitis  -     dextrose 5 % and sodium chloride 0.45 % infusion    Other orders  -     Insert Peripheral IV; Standing  -     Obtain Informed Consent; Standing  -     POC Glucose Once; Standing  -     Insert Peripheral IV  -     Obtain Informed Consent  -     POC Glucose Once        Orders placed during this encounter include:  Orders Placed This Encounter   Procedures   • Obtain Informed Consent     Standing Status:   Standing     Number of Occurrences:   1     Order Specific Question:   Informed Consent Given For     Answer:   COLONOSCOPY   • POC Glucose Once     Prior to Procedure on ALL Diabetic Patients     Standing Status:   Standing     Number of Occurrences:   1     Order Specific Question:   Release to patient     Answer:   Routine Release   • Insert Peripheral IV     Standing Status:   Standing     Number of Occurrences:   1       Medications prescribed:  New Medications Ordered This Visit   Medications   • dextrose 5 % and sodium chloride 0.45 % infusion       Requested Prescriptions     Signed Prescriptions Disp Refills   • polyethylene glycol (GoLYTELY) 236 g solution 4000 mL 0     Sig: Please use Instructions as given in Office       Review and/or summary of lab tests, radiology, procedures, medications. Review and summary of old records and obtaining of history. The risks and benefits of my recommendations, as well as other treatment options were discussed with the patient today. Questions were answered.    Follow-up: No follow-ups on file.     COLONOSCOPY (N/A)      This document has been electronically signed by Homer Mancilla MD on  April 24, 2023 12:01 CDT      Results for orders placed or performed in visit on 03/16/23   CBC Auto Differential    Specimen: Blood   Result Value Ref Range    WBC 8.79 3.40 - 10.80 10*3/mm3    RBC 4.54 3.77 - 5.28 10*6/mm3    Hemoglobin 13.5 12.0 - 15.9 g/dL    Hematocrit 39.5 34.0 - 46.6 %    MCV 87.0 79.0 - 97.0 fL    MCH 29.7 26.6 - 33.0 pg    MCHC 34.2 31.5 - 35.7 g/dL    RDW 13.5 12.3 - 15.4 %    RDW-SD 42.8 37.0 - 54.0 fl    MPV 11.9 6.0 - 12.0 fL    Platelets 327 140 - 450 10*3/mm3    Neutrophil % 59.5 42.7 - 76.0 %    Lymphocyte % 28.2 19.6 - 45.3 %    Monocyte % 6.5 5.0 - 12.0 %    Eosinophil % 4.0 0.3 - 6.2 %    Basophil % 1.0 0.0 - 1.5 %    Immature Grans % 0.8 (H) 0.0 - 0.5 %    Neutrophils, Absolute 5.23 1.70 - 7.00 10*3/mm3    Lymphocytes, Absolute 2.48 0.70 - 3.10 10*3/mm3    Monocytes, Absolute 0.57 0.10 - 0.90 10*3/mm3    Eosinophils, Absolute 0.35 0.00 - 0.40 10*3/mm3    Basophils, Absolute 0.09 0.00 - 0.20 10*3/mm3    Immature Grans, Absolute 0.07 (H) 0.00 - 0.05 10*3/mm3    nRBC 0.0 0.0 - 0.2 /100 WBC   TSH    Specimen: Blood   Result Value Ref Range    TSH 2.680 0.270 - 4.200 uIU/mL   T4, Free    Specimen: Blood   Result Value Ref Range    Free T4 1.00 0.93 - 1.70 ng/dL   Comprehensive Metabolic Panel    Specimen: Blood   Result Value Ref Range    Glucose 115 (H) 65 - 99 mg/dL    BUN 13 6 - 20 mg/dL    Creatinine 0.76 0.57 - 1.00 mg/dL    Sodium 140 136 - 145 mmol/L    Potassium 4.3 3.5 - 5.2 mmol/L    Chloride 104 98 - 107 mmol/L    CO2 26.0 22.0 - 29.0 mmol/L    Calcium 9.3 8.6 - 10.5 mg/dL    Total Protein 8.0 6.0 - 8.5 g/dL    Albumin 4.6 3.5 - 5.2 g/dL    ALT (SGPT) 37 (H) 1 - 33 U/L    AST (SGOT) 26 1 - 32 U/L    Alkaline Phosphatase 191 (H) 39 - 117 U/L    Total Bilirubin 0.5 0.0 - 1.2 mg/dL    Globulin 3.4 gm/dL    A/G Ratio 1.4 g/dL    BUN/Creatinine Ratio 17.1 7.0 - 25.0    Anion Gap 10.0 5.0 - 15.0 mmol/L    eGFR 91.0 >60.0 mL/min/1.73   Results for orders placed or performed in  visit on 03/16/23   Mobile Cardiac Outpatient Telemetry   Result Value Ref Range    Target HR (85%) 138 bpm    Max. Pred. HR (100%) 162 bpm   Results for orders placed or performed in visit on 03/16/23   ECG 12 Lead   Result Value Ref Range    QT Interval 366 ms    QTC Interval 457 ms   Results for orders placed or performed in visit on 01/27/23   ECG 12 Lead   Result Value Ref Range    QT Interval 346 ms    QTC Interval 459 ms   Results for orders placed or performed in visit on 01/19/23   POC Glycosylated Hemoglobin (Hb A1C)    Specimen: Blood   Result Value Ref Range    Hemoglobin A1C 7.7 %    Lot Number [id number]     Expiration Date 03-    Results for orders placed or performed in visit on 09/22/22   Iron and TIBC    Specimen: Blood   Result Value Ref Range    Iron 54 37 - 145 mcg/dL    Iron Saturation 15 (L) 20 - 50 %    Transferrin 240 200 - 360 mg/dL    TIBC 358 298 - 536 mcg/dL   MicroAlbumin, Urine, Random - Urine, Clean Catch    Specimen: Urine, Clean Catch   Result Value Ref Range    Microalbumin, Urine 1.8 mg/dL   Protime-INR    Specimen: Blood   Result Value Ref Range    Protime 14.6 11.1 - 15.3 Seconds    INR 1.14 0.80 - 1.20   Hemoglobin A1c    Specimen: Blood   Result Value Ref Range    Hemoglobin A1C 7.10 (H) 4.80 - 5.60 %   Lipid panel    Specimen: Blood   Result Value Ref Range    Total Cholesterol 127 0 - 200 mg/dL    Triglycerides 101 0 - 150 mg/dL    HDL Cholesterol 46 40 - 60 mg/dL    LDL Cholesterol  62 0 - 100 mg/dL    VLDL Cholesterol 19 5 - 40 mg/dL    LDL/HDL Ratio 1.32    Comprehensive Metabolic Panel    Specimen: Blood   Result Value Ref Range    Glucose 97 65 - 99 mg/dL    BUN 10 6 - 20 mg/dL    Creatinine 0.62 0.57 - 1.00 mg/dL    Sodium 142 136 - 145 mmol/L    Potassium 3.9 3.5 - 5.2 mmol/L    Chloride 104 98 - 107 mmol/L    CO2 24.0 22.0 - 29.0 mmol/L    Calcium 9.5 8.6 - 10.5 mg/dL    Total Protein 7.4 6.0 - 8.5 g/dL    Albumin 4.50 3.50 - 5.20 g/dL    ALT (SGPT) 24 1 - 33 U/L     AST (SGOT) 19 1 - 32 U/L    Alkaline Phosphatase 168 (H) 39 - 117 U/L    Total Bilirubin 0.7 0.0 - 1.2 mg/dL    Globulin 2.9 gm/dL    A/G Ratio 1.6 g/dL    BUN/Creatinine Ratio 16.1 7.0 - 25.0    Anion Gap 14.0 5.0 - 15.0 mmol/L    eGFR 104.0 >60.0 mL/min/1.73   Results for orders placed or performed in visit on 02/22/22   MENDOZA Fibrosure    Specimen: Blood   Result Value Ref Range    Fibrosis Score (References) 0.20 0.00 - 0.21    Fibrosis Stage (Reference) Comment     Steatosis Score (Reference) 0.74 (H) 0.00 - 0.30    Steatosis Grade (Reference) Comment     MENDOZA Score (Reference) 0.75 (H) 0.25    Mendoza Grade (Reference) Comment     Height: (Reference) 60 in    Weight: (Reference) 183 LBS    Alpha 2-Macroglobulins, Qn 140 110 - 276 mg/dL    Haptoglobin 83 33 - 346 mg/dL    Apolipoprotein A-1 156 116 - 209 mg/dL    Total Bilirubin 0.6 0.0 - 1.2 mg/dL    GGT 65 (H) 0 - 60 IU/L    ALT (SGPT) 22 0 - 40 IU/L    AST (SGOT) P5P (Reference) 20 0 - 40 IU/L    Cholesterol, Total (Reference) 139 100 - 199 mg/dL    Glucose, Serum (Reference) 131 (H) 65 - 99 mg/dL    Triglycerides 199 (H) 0 - 149 mg/dL    Interpretations: (Reference) Comment     Fibrosis Scoring: Comment     Steatosis Grading (Reference) Comment     Mendoza Scoring (Reference) Comment     Limitations: (Reference) Comment     Comment (Reference) Comment      *Note: Due to a large number of results and/or encounters for the requested time period, some results have not been displayed. A complete set of results can be found in Results Review.

## 2023-04-24 NOTE — ANESTHESIA POSTPROCEDURE EVALUATION
Patient: Modesta Kaur    Procedure Summary     Date: 04/24/23 Room / Location: [facility] ENDOSCOPY 1 / [facility] ENDOSCOPY    Anesthesia Start: 1320 Anesthesia Stop: 1334    Procedure: COLONOSCOPY Diagnosis:       Encounter for screening for malignant neoplasm of colon      History of colitis      (Encounter for screening for malignant neoplasm of colon [Z12.11])      (History of colitis [Z87.19])    Surgeons: Homer Mancilla MD Provider: Celso Chun CRNA    Anesthesia Type: general ASA Status: 3          Anesthesia Type: general    Vitals  No vitals data found for the desired time range.          Post Anesthesia Care and Evaluation    Patient location during evaluation: PHASE II  Patient participation: complete - patient participated  Level of consciousness: sleepy but conscious  Pain score: 0  Pain management: adequate    Airway patency: patent  Anesthetic complications: No anesthetic complications  PONV Status: none  Cardiovascular status: acceptable  Respiratory status: acceptable  Hydration status: acceptable  No anesthesia care post op

## 2023-05-01 ENCOUNTER — LAB (OUTPATIENT)
Dept: LAB | Facility: HOSPITAL | Age: 58
End: 2023-05-01
Payer: MEDICARE

## 2023-05-01 PROCEDURE — 84450 TRANSFERASE (AST) (SGOT): CPT | Performed by: PHYSICIAN ASSISTANT

## 2023-05-01 PROCEDURE — 82947 ASSAY GLUCOSE BLOOD QUANT: CPT | Performed by: PHYSICIAN ASSISTANT

## 2023-05-01 PROCEDURE — 82247 BILIRUBIN TOTAL: CPT | Performed by: PHYSICIAN ASSISTANT

## 2023-05-01 PROCEDURE — 82465 ASSAY BLD/SERUM CHOLESTEROL: CPT | Performed by: PHYSICIAN ASSISTANT

## 2023-05-01 PROCEDURE — 84478 ASSAY OF TRIGLYCERIDES: CPT | Performed by: PHYSICIAN ASSISTANT

## 2023-05-01 PROCEDURE — 83010 ASSAY OF HAPTOGLOBIN QUANT: CPT | Performed by: PHYSICIAN ASSISTANT

## 2023-05-01 PROCEDURE — 84080 ASSAY ALKALINE PHOSPHATASES: CPT | Performed by: PHYSICIAN ASSISTANT

## 2023-05-01 PROCEDURE — 83883 ASSAY NEPHELOMETRY NOT SPEC: CPT | Performed by: PHYSICIAN ASSISTANT

## 2023-05-01 PROCEDURE — 84075 ASSAY ALKALINE PHOSPHATASE: CPT | Performed by: PHYSICIAN ASSISTANT

## 2023-05-01 PROCEDURE — 82977 ASSAY OF GGT: CPT | Performed by: PHYSICIAN ASSISTANT

## 2023-05-01 PROCEDURE — 85610 PROTHROMBIN TIME: CPT | Performed by: PHYSICIAN ASSISTANT

## 2023-05-01 PROCEDURE — 84460 ALANINE AMINO (ALT) (SGPT): CPT | Performed by: PHYSICIAN ASSISTANT

## 2023-05-01 PROCEDURE — 82172 ASSAY OF APOLIPOPROTEIN: CPT | Performed by: PHYSICIAN ASSISTANT

## 2023-05-01 PROCEDURE — 82105 ALPHA-FETOPROTEIN SERUM: CPT | Performed by: PHYSICIAN ASSISTANT

## 2023-05-02 ENCOUNTER — OFFICE VISIT (OUTPATIENT)
Dept: GASTROENTEROLOGY | Facility: CLINIC | Age: 58
End: 2023-05-02
Payer: MEDICARE

## 2023-05-02 VITALS
OXYGEN SATURATION: 96 % | SYSTOLIC BLOOD PRESSURE: 144 MMHG | HEART RATE: 101 BPM | BODY MASS INDEX: 36.91 KG/M2 | DIASTOLIC BLOOD PRESSURE: 94 MMHG | WEIGHT: 188 LBS | HEIGHT: 60 IN

## 2023-05-02 DIAGNOSIS — K75.81 NASH (NONALCOHOLIC STEATOHEPATITIS): ICD-10-CM

## 2023-05-02 DIAGNOSIS — K74.00 HEPATIC FIBROSIS: ICD-10-CM

## 2023-05-02 DIAGNOSIS — R74.8 ALKALINE PHOSPHATASE ELEVATION: ICD-10-CM

## 2023-05-02 DIAGNOSIS — Z87.19 HISTORY OF COLITIS: Primary | ICD-10-CM

## 2023-05-02 LAB
ALPHA-FETOPROTEIN: <2 NG/ML (ref 0–8.3)
INR PPP: 1.12 (ref 0.8–1.2)
PROTHROMBIN TIME: 14.2 SECONDS (ref 11.1–15.3)

## 2023-05-02 PROCEDURE — 3077F SYST BP >= 140 MM HG: CPT | Performed by: PHYSICIAN ASSISTANT

## 2023-05-02 PROCEDURE — 3080F DIAST BP >= 90 MM HG: CPT | Performed by: PHYSICIAN ASSISTANT

## 2023-05-02 PROCEDURE — 99214 OFFICE O/P EST MOD 30 MIN: CPT | Performed by: PHYSICIAN ASSISTANT

## 2023-05-02 NOTE — PROGRESS NOTES
Chief Complaint   Patient presents with   • Follow-up     6 week---- After Colonoscopy done on: 04/24/23   • Elevated Hepatic Enzymes   • MENDOZA       ENDO PROCEDURE ORDERED:    Lázaro Kaur is a 57 y.o. female. she is here today for follow-up.    History of Present Illness    Patient is seen on a recheck of her MENDOZA, elevated liver enzymes, GERD, history of colitis. Last seen 03/21/2023. The patient did undergo colonoscopy on 04/24/2023 and it showed internal and external hemorrhoids with adequate prep. Ho biopsies were obtained. Recommended repeat screening colonoscopy at 5 years given her previous colitis. GERD is doing well on the Protonix. She denied nausea, vomiting and dysphagia. Bowels are moving. Weight is down 5 pounds since last visit. She is on Plavix, aspirin and Lipitor.    The patient did get her laboratories drawn on 05/01/2023. INR 1.12. The rest of her labs were pending at the time of her visit, but did return with normal alpha-fetoprotein. Alkaline phosphatase isoenzymes  was elevated at 155 with 54% liver, 30% bone and 16% intestine. MENDOZA FibroSure 0.11/F0, steatosis 0.76/S3, 0.75/N2, glucose 132.    ASSESSMENT/PLAN: Patient with previous colitis, normal colonoscopy. Recommend repeat screening colonoscopy in 5 years as she is -American. Laboratories were pending at the time of her visit. Encouraged continued dietary modification and weight loss for the fatty liver. Elevation in alkaline phosphatase is mixed. I would need to consider repeat testing. Will plan followup in a few months, sooner if needed, further pending clinical course and the results of the above.       The following portions of the patient's history were reviewed and updated as appropriate:   Past Medical History:   Diagnosis Date   • Cellulitis of other sites - right foot, resolved    • Cerebrovascular accident    • Diabetes mellitus    • Essential hypertension    • Other specified local infections of the  skin and subcutaneous tissue - right foot.    • Rash and other nonspecific skin eruption    • Right hemiparesis    • Streptococcal sore throat    • URI (upper respiratory infection)      Past Surgical History:   Procedure Laterality Date   •  SECTION     • COLONOSCOPY     • COLONOSCOPY N/A 2023    Procedure: COLONOSCOPY;  Surgeon: Homer Mancilla MD;  Location: [facility] ENDOSCOPY;  Service: Gastroenterology;  Laterality: N/A;     Family History   Problem Relation Age of Onset   • Diabetes Other      OB History    No obstetric history on file.       Allergies   Allergen Reactions   • Lisinopril Hives   • Triamterene Hives     Social History     Socioeconomic History   • Marital status:    Tobacco Use   • Smoking status: Never     Passive exposure: Current   • Smokeless tobacco: Never   Vaping Use   • Vaping Use: Never used   Substance and Sexual Activity   • Alcohol use: No   • Drug use: Not Currently   • Sexual activity: Defer     Partners: Male     Current Medications:  Prior to Admission medications    Medication Sig Start Date End Date Taking? Authorizing Provider   amLODIPine (NORVASC) 10 MG tablet Take 1 tablet by mouth Every Morning. 23  Yes Ronn Forte MD   aspirin (Aspirin Low Dose) 81 MG EC tablet Take 1 tablet by mouth Daily. 23  Yes Ronn Forte MD   atorvastatin (LIPITOR) 40 MG tablet Take 1 tablet by mouth every night at bedtime. 23  Yes Ronn Forte MD   Blood Glucose Monitoring Suppl w/Device kit Check blood sugar once daily for hyperglycemia 23  Yes Butch Atkinson APRN   clopidogrel (PLAVIX) 75 MG tablet Take 1 tablet by mouth Daily. 23  Yes Ronn Forte MD   ferrous sulfate (FeroSul) 325 (65 FE) MG tablet Take 1 tablet by mouth Daily With Breakfast. 23  Yes Ronn Forte MD   glucose blood test strip Check blood sugar once daily for hyperglycemia 23  Yes Butch Atkinson APRN   Lancets 30G misc 1 each Daily. 23  Yes  "Demetrio JESUS Soto   losartan (COZAAR) 100 MG tablet Take 1 tablet by mouth Daily. 1/27/23  Yes Ronn Forte MD   metFORMIN (Glucophage) 850 MG tablet Take 1 tablet by mouth 2 (Two) Times a Day With Meals. 1/27/23  Yes Ronn Forte MD   pantoprazole (PROTONIX) 20 MG EC tablet Take 1 tablet by mouth Daily. 1/27/23  Yes Ronn Forte MD   Probiotic Product (Probiotic Blend) capsule Take 1 capsule by mouth Daily With Lunch. 1/27/23  Yes Ronn Forte MD     Review of Systems  Review of Systems       Objective    /94 (BP Location: Left arm, Patient Position: Sitting)   Pulse 101   Ht 152.4 cm (60\")   Wt 85.3 kg (188 lb)   LMP  (LMP Unknown)   SpO2 96%   BMI 36.72 kg/m²   Physical Exam  Vitals and nursing note reviewed.   Constitutional:       General: She is not in acute distress.     Appearance: She is well-developed. She is obese.      Comments: EDWINA   HENT:      Head: Normocephalic and atraumatic.   Eyes:      Pupils: Pupils are equal, round, and reactive to light.   Cardiovascular:      Rate and Rhythm: Normal rate and regular rhythm.      Heart sounds: Normal heart sounds.   Pulmonary:      Effort: Pulmonary effort is normal.      Breath sounds: Normal breath sounds.   Abdominal:      General: Bowel sounds are normal. There is no distension or abdominal bruit.      Palpations: Abdomen is soft. Abdomen is not rigid. There is no shifting dullness or mass.      Tenderness: There is abdominal tenderness. There is no guarding or rebound.      Hernia: No hernia is present. There is no hernia in the ventral area.   Musculoskeletal:         General: Normal range of motion.      Cervical back: Normal range of motion.   Skin:     General: Skin is warm and dry.   Neurological:      Mental Status: She is alert and oriented to person, place, and time.   Psychiatric:         Behavior: Behavior normal.         Thought Content: Thought content normal.         Judgment: Judgment normal.       Assessment " & Plan      1. History of colitis    2. MENDOZA (nonalcoholic steatohepatitis)    3. Hepatic fibrosis    4. Alkaline phosphatase elevation    .   Diagnoses and all orders for this visit:    1. History of colitis (Primary)    2. MENDOZA (nonalcoholic steatohepatitis)  -     Comprehensive Metabolic Panel  -     Alkaline Phosphatase, Isoenzymes  -     Protime-INR    3. Hepatic fibrosis  -     Comprehensive Metabolic Panel  -     Alkaline Phosphatase, Isoenzymes  -     Protime-INR    4. Alkaline phosphatase elevation  -     Comprehensive Metabolic Panel  -     Alkaline Phosphatase, Isoenzymes  -     Protime-INR        Orders placed during this encounter include:  Orders Placed This Encounter   Procedures   • Comprehensive Metabolic Panel     Order Specific Question:   Release to patient     Answer:   Routine Release   • Alkaline Phosphatase, Isoenzymes     Order Specific Question:   Release to patient     Answer:   Routine Release   • Protime-INR       Medications prescribed:  No orders of the defined types were placed in this encounter.      Requested Prescriptions      No prescriptions requested or ordered in this encounter       Review and/or summary of lab tests, radiology, procedures, medications. Review and summary of old records and obtaining of history. The risks and benefits of my recommendations, as well as other treatment options were discussed with the patient today. Questions were answered.    Follow-up: Return in about 2 months (around 7/2/2023), or if symptoms worsen or fail to improve, for lab prior.     * Surgery not found *      This document has been electronically signed by Artemio Rosenberg PA-C on May 16, 2023 20:35 CDT      Results for orders placed or performed in visit on 05/12/23   POC Glycated Hemoglobin, Total    Specimen: Urine   Result Value Ref Range    Hemoglobin A1C 7.2 %    Lot Number [id number]     Expiration Date 3,618,829    Results for orders placed or performed in visit on 03/21/23   MENDOZA  Fibrosure    Specimen: Blood   Result Value Ref Range    Fibrosis Score (References) 0.11 0.00 - 0.21    Fibrosis Stage (Reference) Comment     Steatosis Score (Reference) 0.76 (H) 0.00 - 0.30    Steatosis Grade (Reference) Comment     MENDOZA Score (Reference) 0.75 (H) 0.25    Mendoza Grade (Reference) Comment     Methodology: Comment     Height: (Reference) 60 in    Weight: (Reference) 189 LBS    Alpha 2-Macroglobulins, Qn 113 110 - 276 mg/dL    Haptoglobin 94 33 - 346 mg/dL    Apolipoprotein A-1 159 116 - 209 mg/dL    Total Bilirubin 0.5 0.0 - 1.2 mg/dL    GGT 53 0 - 60 IU/L    ALT (SGPT) 34 0 - 40 IU/L    AST (SGOT) P5P (Reference) 22 0 - 40 IU/L    Cholesterol, Total (Reference) 144 100 - 199 mg/dL    Glucose, Serum (Reference) 132 (H) 70 - 99 mg/dL    Triglycerides 148 0 - 149 mg/dL    Interpretations: (Reference) Comment     Fibrosis Scoring: Comment     Steatosis Grading (Reference) Comment     Mendoza Scoring (Reference) Comment     Limitations: (Reference) Comment     Comment (Reference) Comment    Alkaline Phosphatase, Isoenzymes    Specimen: Blood   Result Value Ref Range    Alkaline Phosphatase 155 (H) 44 - 121 IU/L    Liver Fraction: 54 18 - 85 %    Bone Fraction: 30 14 - 68 %    Intestinal Frac.: 16 0 - 18 %   AFP Tumor Marker    Specimen: Blood   Result Value Ref Range    ALPHA-FETOPROTEIN <2 0 - 8.3 ng/mL   Protime-INR    Specimen: Blood   Result Value Ref Range    Protime 14.2 11.1 - 15.3 Seconds    INR 1.12 0.80 - 1.20   Results for orders placed or performed in visit on 03/16/23   CBC Auto Differential    Specimen: Blood   Result Value Ref Range    WBC 8.79 3.40 - 10.80 10*3/mm3    RBC 4.54 3.77 - 5.28 10*6/mm3    Hemoglobin 13.5 12.0 - 15.9 g/dL    Hematocrit 39.5 34.0 - 46.6 %    MCV 87.0 79.0 - 97.0 fL    MCH 29.7 26.6 - 33.0 pg    MCHC 34.2 31.5 - 35.7 g/dL    RDW 13.5 12.3 - 15.4 %    RDW-SD 42.8 37.0 - 54.0 fl    MPV 11.9 6.0 - 12.0 fL    Platelets 327 140 - 450 10*3/mm3    Neutrophil % 59.5 42.7 -  76.0 %    Lymphocyte % 28.2 19.6 - 45.3 %    Monocyte % 6.5 5.0 - 12.0 %    Eosinophil % 4.0 0.3 - 6.2 %    Basophil % 1.0 0.0 - 1.5 %    Immature Grans % 0.8 (H) 0.0 - 0.5 %    Neutrophils, Absolute 5.23 1.70 - 7.00 10*3/mm3    Lymphocytes, Absolute 2.48 0.70 - 3.10 10*3/mm3    Monocytes, Absolute 0.57 0.10 - 0.90 10*3/mm3    Eosinophils, Absolute 0.35 0.00 - 0.40 10*3/mm3    Basophils, Absolute 0.09 0.00 - 0.20 10*3/mm3    Immature Grans, Absolute 0.07 (H) 0.00 - 0.05 10*3/mm3    nRBC 0.0 0.0 - 0.2 /100 WBC   TSH    Specimen: Blood   Result Value Ref Range    TSH 2.680 0.270 - 4.200 uIU/mL   T4, Free    Specimen: Blood   Result Value Ref Range    Free T4 1.00 0.93 - 1.70 ng/dL   Comprehensive Metabolic Panel    Specimen: Blood   Result Value Ref Range    Glucose 115 (H) 65 - 99 mg/dL    BUN 13 6 - 20 mg/dL    Creatinine 0.76 0.57 - 1.00 mg/dL    Sodium 140 136 - 145 mmol/L    Potassium 4.3 3.5 - 5.2 mmol/L    Chloride 104 98 - 107 mmol/L    CO2 26.0 22.0 - 29.0 mmol/L    Calcium 9.3 8.6 - 10.5 mg/dL    Total Protein 8.0 6.0 - 8.5 g/dL    Albumin 4.6 3.5 - 5.2 g/dL    ALT (SGPT) 37 (H) 1 - 33 U/L    AST (SGOT) 26 1 - 32 U/L    Alkaline Phosphatase 191 (H) 39 - 117 U/L    Total Bilirubin 0.5 0.0 - 1.2 mg/dL    Globulin 3.4 gm/dL    A/G Ratio 1.4 g/dL    BUN/Creatinine Ratio 17.1 7.0 - 25.0    Anion Gap 10.0 5.0 - 15.0 mmol/L    eGFR 91.0 >60.0 mL/min/1.73   Results for orders placed or performed in visit on 03/16/23   Mobile Cardiac Outpatient Telemetry   Result Value Ref Range    Target HR (85%) 138 bpm    Max. Pred. HR (100%) 162 bpm   Results for orders placed or performed in visit on 03/16/23   ECG 12 Lead   Result Value Ref Range    QT Interval 366 ms    QTC Interval 457 ms   Results for orders placed or performed in visit on 01/27/23   ECG 12 Lead   Result Value Ref Range    QT Interval 346 ms    QTC Interval 459 ms   Results for orders placed or performed in visit on 01/19/23   POC Glycosylated Hemoglobin (Hb  A1C)    Specimen: Blood   Result Value Ref Range    Hemoglobin A1C 7.7 %    Lot Number [id number]     Expiration Date 03-    Results for orders placed or performed in visit on 09/22/22   Iron and TIBC    Specimen: Blood   Result Value Ref Range    Iron 54 37 - 145 mcg/dL    Iron Saturation 15 (L) 20 - 50 %    Transferrin 240 200 - 360 mg/dL    TIBC 358 298 - 536 mcg/dL   MicroAlbumin, Urine, Random - Urine, Clean Catch    Specimen: Urine, Clean Catch   Result Value Ref Range    Microalbumin, Urine 1.8 mg/dL   Protime-INR    Specimen: Blood   Result Value Ref Range    Protime 14.6 11.1 - 15.3 Seconds    INR 1.14 0.80 - 1.20   Hemoglobin A1c    Specimen: Blood   Result Value Ref Range    Hemoglobin A1C 7.10 (H) 4.80 - 5.60 %   Lipid panel    Specimen: Blood   Result Value Ref Range    Total Cholesterol 127 0 - 200 mg/dL    Triglycerides 101 0 - 150 mg/dL    HDL Cholesterol 46 40 - 60 mg/dL    LDL Cholesterol  62 0 - 100 mg/dL    VLDL Cholesterol 19 5 - 40 mg/dL    LDL/HDL Ratio 1.32      *Note: Due to a large number of results and/or encounters for the requested time period, some results have not been displayed. A complete set of results can be found in Results Review.

## 2023-05-03 LAB
ALP BONE CFR SERPL: 30 % (ref 14–68)
ALP INTEST CFR SERPL: 16 % (ref 0–18)
ALP LIVER CFR SERPL: 54 % (ref 18–85)
ALP SERPL-CCNC: 155 IU/L (ref 44–121)

## 2023-05-05 LAB
A2 MACROGLOB SERPL-MCNC: 113 MG/DL (ref 110–276)
ALT SERPL W P-5'-P-CCNC: 34 IU/L (ref 0–40)
APO A-I SERPL-MCNC: 159 MG/DL (ref 116–209)
AST SERPL W P-5'-P-CCNC: 22 IU/L (ref 0–40)
BILIRUB SERPL-MCNC: 0.5 MG/DL (ref 0–1.2)
CHOLEST SERPL-MCNC: 144 MG/DL (ref 100–199)
FIBROSIS SCORING:: ABNORMAL
FIBROSIS STAGE SERPL QL: ABNORMAL
GGT SERPL-CCNC: 53 IU/L (ref 0–60)
GLUCOSE SERPL-MCNC: 132 MG/DL (ref 70–99)
HAPTOGLOB SERPL-MCNC: 94 MG/DL (ref 33–346)
INTERPRETATIONS: (REFERENCE): ABNORMAL
LABORATORY COMMENT REPORT: ABNORMAL
LIVER FIBR SCORE SERPL CALC.FIBROSURE: 0.11 (ref 0–0.21)
NASH SCORING (REFERENCE): ABNORMAL
NECROINFLAMMATORY ACT GRADE SERPL QL: ABNORMAL
NECROINFLAMMATORY ACT SCORE SERPL: 0.75
SERVICE CMNT-IMP: ABNORMAL
STEATOSIS GRADE (REFERENCE): ABNORMAL
STEATOSIS GRADING (REFERENCE): ABNORMAL
STEATOSIS SCORE (REFERENCE): 0.76 (ref 0–0.3)
TEST PERFORMANCE INFO SPEC: ABNORMAL
TRIGL SERPL-MCNC: 148 MG/DL (ref 0–149)

## 2023-05-12 ENCOUNTER — OFFICE VISIT (OUTPATIENT)
Dept: FAMILY MEDICINE CLINIC | Facility: CLINIC | Age: 58
End: 2023-05-12
Payer: MEDICARE

## 2023-05-12 VITALS
TEMPERATURE: 98.2 F | OXYGEN SATURATION: 93 % | SYSTOLIC BLOOD PRESSURE: 126 MMHG | HEIGHT: 60 IN | DIASTOLIC BLOOD PRESSURE: 82 MMHG | BODY MASS INDEX: 37.38 KG/M2 | HEART RATE: 94 BPM | WEIGHT: 190.4 LBS

## 2023-05-12 DIAGNOSIS — E11.65 TYPE 2 DIABETES MELLITUS WITH HYPERGLYCEMIA, WITHOUT LONG-TERM CURRENT USE OF INSULIN: Primary | ICD-10-CM

## 2023-05-12 LAB
EXPIRATION DATE: ABNORMAL
HBA1C MFR BLD: 7.2 %
Lab: 576

## 2023-05-12 NOTE — PROGRESS NOTES
"Subjective:  Modesta Kaur is a 57 y.o. female who presents for     Type 2 diabetes; currently on Metformin 850mg BID. Denies any issues with medication, states that blood glucose has been ok.  Denies any episodes of hypoglycemia, polyphagia, polydipsia, polyuria.  Last eye exam was Last august, last foot exam was > 1 year ago.    Patient Active Problem List   Diagnosis   • Type 2 diabetes mellitus without complication, without long-term current use of insulin   • Mild intermittent asthma without complication   • Essential hypertension   • Seasonal allergic rhinitis due to pollen   • History of CVA (cerebrovascular accident)   • SHEYLA (obstructive sleep apnea)   • Class 2 severe obesity with serious comorbidity and body mass index (BMI) of 35.0 to 35.9 in adult   • Right hemiparesis   • Gastroesophageal reflux disease without esophagitis   • Hyperlipidemia   • Vitamin D deficiency   • Anemia   • Tachycardia   • Encounter for screening for malignant neoplasm of colon   • History of colitis     Vitals:    Vitals:    05/12/23 1255   BP: 126/82   BP Location: Left arm   Patient Position: Sitting   Cuff Size: Large Adult   Pulse: 94   Temp: 98.2 °F (36.8 °C)   TempSrc: Oral   SpO2: 93%   Weight: 86.4 kg (190 lb 6.4 oz)   Height: 152.4 cm (60\")     Body mass index is 37.18 kg/m².      Current Outpatient Medications:   •  amLODIPine (NORVASC) 10 MG tablet, Take 1 tablet by mouth Every Morning., Disp: 90 tablet, Rfl: 3  •  aspirin (Aspirin Low Dose) 81 MG EC tablet, Take 1 tablet by mouth Daily., Disp: 90 tablet, Rfl: 3  •  atorvastatin (LIPITOR) 40 MG tablet, Take 1 tablet by mouth every night at bedtime., Disp: 90 tablet, Rfl: 3  •  Blood Glucose Monitoring Suppl w/Device kit, Check blood sugar once daily for hyperglycemia, Disp: 1 each, Rfl: 0  •  clopidogrel (PLAVIX) 75 MG tablet, Take 1 tablet by mouth Daily., Disp: 90 tablet, Rfl: 3  •  ferrous sulfate (FeroSul) 325 (65 FE) MG tablet, Take 1 tablet by mouth Daily With " Breakfast., Disp: 90 tablet, Rfl: 3  •  glucose blood test strip, Check blood sugar once daily for hyperglycemia, Disp: 50 each, Rfl: 12  •  Lancets 30G misc, 1 each Daily., Disp: 100 each, Rfl: 12  •  losartan (COZAAR) 100 MG tablet, Take 1 tablet by mouth Daily., Disp: 90 tablet, Rfl: 3  •  metFORMIN (GLUCOPHAGE) 1000 MG tablet, Take 1 tablet by mouth 2 (Two) Times a Day With Meals., Disp: 180 tablet, Rfl: 3  •  pantoprazole (PROTONIX) 20 MG EC tablet, Take 1 tablet by mouth Daily., Disp: 90 tablet, Rfl: 3  •  Probiotic Product (Probiotic Blend) capsule, Take 1 capsule by mouth Daily With Lunch., Disp: 90 capsule, Rfl: 3    Patient Active Problem List   Diagnosis   • Type 2 diabetes mellitus without complication, without long-term current use of insulin   • Mild intermittent asthma without complication   • Essential hypertension   • Seasonal allergic rhinitis due to pollen   • History of CVA (cerebrovascular accident)   • SHEYLA (obstructive sleep apnea)   • Class 2 severe obesity with serious comorbidity and body mass index (BMI) of 35.0 to 35.9 in adult   • Right hemiparesis   • Gastroesophageal reflux disease without esophagitis   • Hyperlipidemia   • Vitamin D deficiency   • Anemia   • Tachycardia   • Encounter for screening for malignant neoplasm of colon   • History of colitis     Past Surgical History:   Procedure Laterality Date   •  SECTION     • COLONOSCOPY     • COLONOSCOPY N/A 2023    Procedure: COLONOSCOPY;  Surgeon: Homer Mancilla MD;  Location: [facility] ENDOSCOPY;  Service: Gastroenterology;  Laterality: N/A;     Social History     Socioeconomic History   • Marital status:    Tobacco Use   • Smoking status: Never     Passive exposure: Current   • Smokeless tobacco: Never   Vaping Use   • Vaping Use: Never used   Substance and Sexual Activity   • Alcohol use: No   • Drug use: Not Currently   • Sexual activity: Defer     Partners: Male     Family History   Problem Relation Age of  Onset   • Diabetes Other      Office Visit on 03/21/2023   Component Date Value Ref Range Status   • Fibrosis Score (References) 05/01/2023 0.11  0.00 - 0.21 Final   • Fibrosis Stage (Reference) 05/01/2023 Comment   Final                       F0 - No fibrosis   • Steatosis Score (Reference) 05/01/2023 0.76 (H)  0.00 - 0.30 Final   • Steatosis Grade (Reference) 05/01/2023 Comment   Final                S3 - Marked or Severe Steatosis   • MENDOZA Score (Reference) 05/01/2023 0.75 (H)  0.25 Final   • Mendoza Grade (Reference) 05/01/2023 Comment   Final                           N2 - MENDOZA   • Methodology: 05/01/2023 Comment   Final    The analytes tested are performed by FibroSure-Specific methods.  Not intended for use with other diagnostic considerations.   • Height: (Reference) 05/01/2023 60  in Final   • Weight: (Reference) 05/01/2023 189  LBS Final   • Alpha 2-Macroglobulins, Qn 05/01/2023 113  110 - 276 mg/dL Final   • Haptoglobin 05/01/2023 94  33 - 346 mg/dL Final   • Apolipoprotein A-1 05/01/2023 159  116 - 209 mg/dL Final   • Total Bilirubin 05/01/2023 0.5  0.0 - 1.2 mg/dL Final   • GGT 05/01/2023 53  0 - 60 IU/L Final   • ALT (SGPT) 05/01/2023 34  0 - 40 IU/L Final   • AST (SGOT) P5P (Reference) 05/01/2023 22  0 - 40 IU/L Final   • Cholesterol, Total (Reference) 05/01/2023 144  100 - 199 mg/dL Final   • Glucose, Serum (Reference) 05/01/2023 132 (H)  70 - 99 mg/dL Final   • Triglycerides 05/01/2023 148  0 - 149 mg/dL Final   • Interpretations: (Reference) 05/01/2023 Comment   Final    Comment: Quantitative results of 10 biochemicals in combination with  age, gender, height, and weight, are analyzed using a  computational algorithm to provide a quantitative surrogate  marker (0.0-1.0) of liver fibrosis (Metavir F0-F4), hepatic  steatosis (0.0-1.0, S0-S3), and Non-Alcoholic Steato-  Hepatitis (MENDOZA) (0.0-0.75, N0-N2). The absence of steatosis  (S<0.38) precludes the diagnosis of MENDOZA.  Fibrosis marker:  In a study of  171 Non-Alcoholic Fatty  Liver Disease (NAFLD) patients where 23% had significant  NAFLD fibrosis (Metavir F2-F4) and 11% had cirrhosis by  liver biopsy, a fibrosis result of >0.3 yielded a  sensitivity of 83% and a specificity of 78% for the  detection of significant fibrosis(1).  Steatosis Marker:  In a population of 744 patients (583 HCV,  18 HBV, 69 NAFLD, and 74 alcoholic disease patients), where  36% had significant steatosis (>5%) on a liver biopsy, a  steatosis score >0.5 had a sensitivity of 71% and a  specificity of 72% for identification of                            significant  steatosis(2).  MENDOZA marker:  In a population of 257 NAFLD patients, where  62% had at least some MENDOZA by liver biopsy, a prediction of  MENDOZA had a sensitivity of 88% for identifying MENDOZA and a  specificity of 50%(3).   • Fibrosis Scorin2023 Comment   Final         <=0.21 = Stage F0 - No fibrosis  0.21 - 0.27 = Stage F0 - F1  0.27 - 0.31 = Stage F1 - Portal fibrosis  0.31 - 0.48 = Stage F1 - F2  0.48 - 0.58 = Stage F2 - Bridging fibrosis with few septa  0.58 - 0.72 = Stage F3 - Bridging fibrosis with many septa  0.72 - 0.74 = Stage F3 - F4        >0.74 = Stage F4 - Cirrhosis   • Steatosis Grading (Reference) 2023 Comment   Final          < 0.30 = S0 - No Steatosis  0.30 to 0.38 = S0 - S1  0.38 to 0.48 = S1 - Minimal Steatosis  0.48 to 0.57 = S1 - S2  0.57 to 0.67 = S2 - Moderate Steatosis  0.67 to 0.69 = S2 - S3        > 0.69 = S3 - Marked or Severe Steatosis   • Mendoza Scoring (Reference) 2023 Comment   Final    0.25 = N0 - Not MENDOZA  0.50 = N1 - Borderline or probable MENDOZA  0.75 = N2 - MENDOZA   • Limitations: (Reference) 2023 Comment   Final    MENDOZA FibroSure is recommended for patients with suspected non-  alcoholic fatty liver disease.  It is not recommended for patients  with other liver diseases.  It is also not recommended in patients  with Gilbert Disease, acute hemolysis, acute viral hepatitis,  drug  induced hepatitis, genetic liver disease, autoimmune hepatitis and/or  extra-hepatic cholestasis.  Any of these clinical situations may lead  to inaccurate quantitative predictions of fibrosis.   • Comment (Reference) 05/01/2023 Comment   Final    Comment: This test was developed and its performance characteristics determined  by [employer].  It has not been cleared or approved by the Food and Drug  Administration.  The FDA has determined that such clearance or  approval is not necessary.  For questions regarding this report please contact  customer service at [phone].  References:  1. Ana CASRTO. et al. Diagnostic Value of Biochemical Markers     (FibroTest) for the prediction of Liver Fibrosis in     patients with Non-Alcoholic Fatty Liver Disease. [facility] 2006; 6:6.  2. CINTHIA Garcia. et al. The Diagnostic Value of Biomarkers     (Steato Test) for the Prediction of Liver Steatosis.     Comparative Hepatol. 2005; 4:10.  3. CINTHIA Garcia, Demetria Perez, et al. Diagnostic value     of biochemical markers (MENDOZA TEST) for the prediction of     non alcohol steato hepatitis in patients with non-     alcoholic fatty liver disease. [facility] 2006;     6:34 doi:10.1186/7190-090Z-3-34.  **Effective June 19, 2023                            MENDOZA FibroSure will be made non-orderable.**    [facility] offers [id number] MENDOZA FibroSure(R) Plus.   • Protime 05/01/2023 14.2  11.1 - 15.3 Seconds Final   • INR 05/01/2023 1.12  0.80 - 1.20 Final   • ALPHA-FETOPROTEIN 05/01/2023 <2  0 - 8.3 ng/mL Final   • Alkaline Phosphatase 05/01/2023 155 (H)  44 - 121 IU/L Final   • Liver Fraction: 05/01/2023 54  18 - 85 % Final   • Bone Fraction: 05/01/2023 30  14 - 68 % Final   • Intestinal Frac.: 05/01/2023 16  0 - 18 % Final   Lab on 03/16/2023   Component Date Value Ref Range Status   • WBC 03/16/2023 8.79  3.40 - 10.80 10*3/mm3 Final   • RBC 03/16/2023 4.54  3.77 - 5.28 10*6/mm3 Final   • Hemoglobin 03/16/2023 13.5   12.0 - 15.9 g/dL Final   • Hematocrit 03/16/2023 39.5  34.0 - 46.6 % Final   • MCV 03/16/2023 87.0  79.0 - 97.0 fL Final   • MCH 03/16/2023 29.7  26.6 - 33.0 pg Final   • MCHC 03/16/2023 34.2  31.5 - 35.7 g/dL Final   • RDW 03/16/2023 13.5  12.3 - 15.4 % Final   • RDW-SD 03/16/2023 42.8  37.0 - 54.0 fl Final   • MPV 03/16/2023 11.9  6.0 - 12.0 fL Final   • Platelets 03/16/2023 327  140 - 450 10*3/mm3 Final   • Neutrophil % 03/16/2023 59.5  42.7 - 76.0 % Final   • Lymphocyte % 03/16/2023 28.2  19.6 - 45.3 % Final   • Monocyte % 03/16/2023 6.5  5.0 - 12.0 % Final   • Eosinophil % 03/16/2023 4.0  0.3 - 6.2 % Final   • Basophil % 03/16/2023 1.0  0.0 - 1.5 % Final   • Immature Grans % 03/16/2023 0.8 (H)  0.0 - 0.5 % Final   • Neutrophils, Absolute 03/16/2023 5.23  1.70 - 7.00 10*3/mm3 Final   • Lymphocytes, Absolute 03/16/2023 2.48  0.70 - 3.10 10*3/mm3 Final   • Monocytes, Absolute 03/16/2023 0.57  0.10 - 0.90 10*3/mm3 Final   • Eosinophils, Absolute 03/16/2023 0.35  0.00 - 0.40 10*3/mm3 Final   • Basophils, Absolute 03/16/2023 0.09  0.00 - 0.20 10*3/mm3 Final   • Immature Grans, Absolute 03/16/2023 0.07 (H)  0.00 - 0.05 10*3/mm3 Final   • nRBC 03/16/2023 0.0  0.0 - 0.2 /100 WBC Final   • Glucose 03/16/2023 115 (H)  65 - 99 mg/dL Final   • BUN 03/16/2023 13  6 - 20 mg/dL Final   • Creatinine 03/16/2023 0.76  0.57 - 1.00 mg/dL Final   • Sodium 03/16/2023 140  136 - 145 mmol/L Final   • Potassium 03/16/2023 4.3  3.5 - 5.2 mmol/L Final   • Chloride 03/16/2023 104  98 - 107 mmol/L Final   • CO2 03/16/2023 26.0  22.0 - 29.0 mmol/L Final   • Calcium 03/16/2023 9.3  8.6 - 10.5 mg/dL Final   • Total Protein 03/16/2023 8.0  6.0 - 8.5 g/dL Final   • Albumin 03/16/2023 4.6  3.5 - 5.2 g/dL Final   • ALT (SGPT) 03/16/2023 37 (H)  1 - 33 U/L Final   • AST (SGOT) 03/16/2023 26  1 - 32 U/L Final   • Alkaline Phosphatase 03/16/2023 191 (H)  39 - 117 U/L Final   • Total Bilirubin 03/16/2023 0.5  0.0 - 1.2 mg/dL Final   • Globulin  03/16/2023 3.4  gm/dL Final   • A/G Ratio 03/16/2023 1.4  g/dL Final   • BUN/Creatinine Ratio 03/16/2023 17.1  7.0 - 25.0 Final   • Anion Gap 03/16/2023 10.0  5.0 - 15.0 mmol/L Final   • eGFR 03/16/2023 91.0  >60.0 mL/min/1.73 Final   • TSH 03/16/2023 2.680  0.270 - 4.200 uIU/mL Final   • Free T4 03/16/2023 1.00  0.93 - 1.70 ng/dL Final   Ancillary Procedure on 03/16/2023   Component Date Value Ref Range Status   • Target HR (85%) 03/16/2023 138  bpm Final   • Max. Pred. HR (100%) 03/16/2023 162  bpm Final   Office Visit on 03/16/2023   Component Date Value Ref Range Status   • QT Interval 03/16/2023 366  ms Final   • QTC Interval 03/16/2023 457  ms Final   Office Visit on 01/27/2023   Component Date Value Ref Range Status   • QT Interval 01/27/2023 346  ms Final   • QTC Interval 01/27/2023 459  ms Final   Office Visit on 01/19/2023   Component Date Value Ref Range Status   • Hemoglobin A1C 01/19/2023 7.7  % Final   • Lot Number 01/19/2023 [id number]   Final   • Expiration Date 01/19/2023 03-   Final      Mobile Cardiac Outpatient Telemetry  Modesta Kaur is a 57-year-old female who is being evaluated for   tachycardia/palpitation    Procedure performed Holter monitoring for 72 hours    Result: The underlying rhythm was sinus with heart rate ranging from 55   bpm to 119 bpm with an average heart rate of 89 bpm.  The patient had   sinus tachycardia 0.67% of the time.  382 PVCs and 14 PACs noted, which   were both less than 1% of total beats.  There were 3 patient triggered   events which correlated with sinus rhythm on 2 occasions and sinus   tachycardia on 1.    Impression: Holter monitoring showing sinus rhythm with rare episodes of   sinus tachycardia.  No significant supraventricular or ventricular   arrhythmias noted.      [unfilled]  Immunization History   Administered Date(s) Administered   • COVID-19 (MODERNA) 1st,2nd,3rd Dose Monovalent 04/13/2021, 05/18/2021, 11/30/2021     The following portions of  the patient's history were reviewed and updated as appropriate: allergies, current medications, past family history, past medical history, past social history, past surgical history and problem list.    PHQ-9 Total Score: 0         Physical Exam  Constitutional:       Appearance: Normal appearance.   HENT:      Head: Normocephalic and atraumatic.      Right Ear: External ear normal.      Left Ear: External ear normal.   Eyes:      General:         Right eye: No discharge.         Left eye: No discharge.      Conjunctiva/sclera: Conjunctivae normal.   Cardiovascular:      Rate and Rhythm: Normal rate and regular rhythm.      Pulses: Normal pulses.      Heart sounds: Normal heart sounds. No murmur heard.  Pulmonary:      Effort: Pulmonary effort is normal. No respiratory distress.      Breath sounds: Normal breath sounds.   Abdominal:      General: There is no distension.      Palpations: Abdomen is soft.      Tenderness: There is no abdominal tenderness.   Musculoskeletal:      Cervical back: Normal range of motion.      Right lower leg: No edema.      Left lower leg: No edema.   Lymphadenopathy:      Cervical: No cervical adenopathy.   Neurological:      Mental Status: She is alert. Mental status is at baseline.   Psychiatric:         Mood and Affect: Mood normal.         Behavior: Behavior normal.         Assessment & Plan    Diagnosis Plan   1. Type 2 diabetes mellitus with hyperglycemia, without long-term current use of insulin  POC Glycated Hemoglobin, Total    metFORMIN (GLUCOPHAGE) 1000 MG tablet    Ambulatory Referral to Podiatry         Orders Placed This Encounter   Procedures   • Ambulatory Referral to Podiatry     Referral Priority:   Routine     Referral Type:   Consultation     Referral Reason:   Specialty Services Required     Requested Specialty:   Podiatry     Number of Visits Requested:   1   • POC Glycated Hemoglobin, Total     Type 2 diabetes; denied issues with current medications, A1c today  7.2%, reassuring.  Continue to encourage diet, exercise, weight loss, up-to-date on eye exam, needs foot exam, will obtain.  After discussion with patient, will increase metformin to 1000 mg twice daily to improve glycemic control. Follow-up in 3 months or sooner if needed.            This document has been electronically signed by Ronn Forte MD on May 26, 2023 15:53 CDT    EMR Dragon/Transciption Disclaimer: Some of this note may be an electronic transcription/translation of spoken language to printed text.  The electronic translation of spoken language may permit erroneous, or at times, nonsensical words or phrases to be inadvertently transcribed. Although I have reviewed the note for such errors, some may still exist.

## 2023-06-06 ENCOUNTER — OFFICE VISIT (OUTPATIENT)
Dept: PODIATRY | Facility: CLINIC | Age: 58
End: 2023-06-06
Payer: MEDICARE

## 2023-06-06 VITALS — BODY MASS INDEX: 37.3 KG/M2 | OXYGEN SATURATION: 97 % | HEIGHT: 60 IN | HEART RATE: 60 BPM | WEIGHT: 190 LBS

## 2023-06-06 DIAGNOSIS — E11.9 ENCOUNTER FOR DIABETIC FOOT EXAM: ICD-10-CM

## 2023-06-06 DIAGNOSIS — E11.9 TYPE 2 DIABETES MELLITUS WITHOUT COMPLICATION, WITHOUT LONG-TERM CURRENT USE OF INSULIN: ICD-10-CM

## 2023-06-06 DIAGNOSIS — G81.91 RIGHT HEMIPARESIS: ICD-10-CM

## 2023-06-06 DIAGNOSIS — Z86.73 HISTORY OF CVA (CEREBROVASCULAR ACCIDENT): Primary | ICD-10-CM

## 2023-06-06 NOTE — PROGRESS NOTES
Modesta Kaur  1965  57 y.o. female  PCP: Ronn Forte: 2023  BS: 193 per pt       2023    Chief Complaint   Patient presents with    Left Foot - Follow-up     Diabetic foot exam    Right Foot - Follow-up     Diabetic foot exam        History of Present Illness    Modesta Kaur is a 57 y.o.female presents to the clinic today for a diabetic foot exam.  Patient denies any pedal complaints.      Past Medical History:   Diagnosis Date    Cellulitis of other sites - right foot, resolved     Cerebrovascular accident     Diabetes mellitus     Essential hypertension     Other specified local infections of the skin and subcutaneous tissue - right foot.     Rash and other nonspecific skin eruption     Right hemiparesis     Streptococcal sore throat     URI (upper respiratory infection)          Past Surgical History:   Procedure Laterality Date     SECTION      COLONOSCOPY      COLONOSCOPY N/A 2023    Procedure: COLONOSCOPY;  Surgeon: Homer Mancilla MD;  Location: [facility] ENDOSCOPY;  Service: Gastroenterology;  Laterality: N/A;         Family History   Problem Relation Age of Onset    Diabetes Other        Allergies   Allergen Reactions    Lisinopril Hives    Triamterene Hives       Social History     Socioeconomic History    Marital status:    Tobacco Use    Smoking status: Never     Passive exposure: Current    Smokeless tobacco: Never   Vaping Use    Vaping Use: Never used   Substance and Sexual Activity    Alcohol use: No    Drug use: Not Currently    Sexual activity: Defer     Partners: Male         Current Outpatient Medications   Medication Sig Dispense Refill    amLODIPine (NORVASC) 10 MG tablet Take 1 tablet by mouth Every Morning. 90 tablet 3    aspirin (Aspirin Low Dose) 81 MG EC tablet Take 1 tablet by mouth Daily. 90 tablet 3    atorvastatin (LIPITOR) 40 MG tablet Take 1 tablet by mouth every night at bedtime. 90 tablet 3    Blood Glucose Monitoring Suppl w/Device kit Check  "blood sugar once daily for hyperglycemia 1 each 0    clopidogrel (PLAVIX) 75 MG tablet Take 1 tablet by mouth Daily. 90 tablet 3    ferrous sulfate (FeroSul) 325 (65 FE) MG tablet Take 1 tablet by mouth Daily With Breakfast. 90 tablet 3    glucose blood test strip Check blood sugar once daily for hyperglycemia 50 each 12    Lancets 30G misc 1 each Daily. 100 each 12    losartan (COZAAR) 100 MG tablet Take 1 tablet by mouth Daily. 90 tablet 3    metFORMIN (GLUCOPHAGE) 1000 MG tablet Take 1 tablet by mouth 2 (Two) Times a Day With Meals. 180 tablet 3    pantoprazole (PROTONIX) 20 MG EC tablet Take 1 tablet by mouth Daily. 90 tablet 3    Probiotic Product (Probiotic Blend) capsule Take 1 capsule by mouth Daily With Lunch. 90 capsule 3     No current facility-administered medications for this visit.       Review of Systems   Constitutional:  Negative for chills and fever.   Respiratory: Negative.  Negative for shortness of breath.    Cardiovascular: Negative.  Negative for leg swelling.   Musculoskeletal: Negative.  Negative for gait problem and joint swelling.   Skin: Negative.  Negative for wound.   Neurological:  Positive for weakness and numbness.   Hematological: Negative.    Psychiatric/Behavioral: Negative.         OBJECTIVE    Pulse 60   Ht 152.4 cm (60\")   Wt 86.2 kg (190 lb)   LMP  (LMP Unknown)   SpO2 97%   BMI 37.11 kg/m²     Physical Exam  Vitals reviewed.   Constitutional:       General: She is not in acute distress.     Appearance: She is well-developed.   HENT:      Head: Normocephalic and atraumatic.      Nose: Nose normal.   Eyes:      Conjunctiva/sclera: Conjunctivae normal.      Pupils: Pupils are equal, round, and reactive to light.   Cardiovascular:      Pulses:           Dorsalis pedis pulses are 1+ on the right side and 1+ on the left side.        Posterior tibial pulses are 1+ on the right side and 1+ on the left side.   Pulmonary:      Effort: Pulmonary effort is normal. No respiratory " distress.      Breath sounds: No wheezing.   Musculoskeletal:         General: No deformity.      Right foot: Decreased range of motion.   Feet:      Right foot:      Protective Sensation: 10 sites tested.  5 sites sensed.      Skin integrity: Skin integrity normal.      Toenail Condition: Right toenails are abnormally thick.      Left foot:      Protective Sensation: 10 sites tested.  5 sites sensed.      Skin integrity: Skin integrity normal.      Toenail Condition: Left toenails are abnormally thick.   Skin:     General: Skin is warm and dry.      Capillary Refill: Capillary refill takes less than 2 seconds.   Neurological:      Mental Status: She is alert and oriented to person, place, and time.      Motor: Weakness present.   Psychiatric:         Behavior: Behavior normal.         Thought Content: Thought content normal.            Procedures        ASSESSMENT AND PLAN    Diagnoses and all orders for this visit:    1. History of CVA (cerebrovascular accident) (Primary)    2. Right hemiparesis    3. Type 2 diabetes mellitus without complication, without long-term current use of insulin    4. Encounter for diabetic foot exam        - A diabetic foot screening exam was performed and the patient was educated on the foot complications related to diabetes,  preventative foot care and what signs and symptoms to watch for.  Instructed to contact our office if any foot problems develop before next visit.  - Due to right-sided weakness, patient may benefit from custom AFO in the future but at the present time she is ambulating well with walker.  - All the patients questions were answered.  - RTC 1 year or sooner if needed.             This document has been electronically signed by JESUS Sauer on June 6, 2023 15:17 CDT

## 2023-08-14 ENCOUNTER — OFFICE VISIT (OUTPATIENT)
Dept: FAMILY MEDICINE CLINIC | Facility: CLINIC | Age: 58
End: 2023-08-14
Payer: MEDICARE

## 2023-08-14 VITALS
BODY MASS INDEX: 36.87 KG/M2 | HEIGHT: 60 IN | TEMPERATURE: 98.2 F | DIASTOLIC BLOOD PRESSURE: 82 MMHG | WEIGHT: 187.8 LBS | HEART RATE: 96 BPM | OXYGEN SATURATION: 97 % | SYSTOLIC BLOOD PRESSURE: 128 MMHG

## 2023-08-14 DIAGNOSIS — E11.59 TYPE 2 DIABETES MELLITUS WITH OTHER CIRCULATORY COMPLICATION, WITHOUT LONG-TERM CURRENT USE OF INSULIN: Primary | ICD-10-CM

## 2023-08-14 LAB
EXPIRATION DATE: ABNORMAL
HBA1C MFR BLD: 6.7 %
Lab: ABNORMAL

## 2023-08-14 PROCEDURE — 83036 HEMOGLOBIN GLYCOSYLATED A1C: CPT | Performed by: STUDENT IN AN ORGANIZED HEALTH CARE EDUCATION/TRAINING PROGRAM

## 2023-08-14 PROCEDURE — 3044F HG A1C LEVEL LT 7.0%: CPT | Performed by: STUDENT IN AN ORGANIZED HEALTH CARE EDUCATION/TRAINING PROGRAM

## 2023-08-14 PROCEDURE — 3079F DIAST BP 80-89 MM HG: CPT | Performed by: STUDENT IN AN ORGANIZED HEALTH CARE EDUCATION/TRAINING PROGRAM

## 2023-08-14 PROCEDURE — 99213 OFFICE O/P EST LOW 20 MIN: CPT | Performed by: STUDENT IN AN ORGANIZED HEALTH CARE EDUCATION/TRAINING PROGRAM

## 2023-08-14 PROCEDURE — 3074F SYST BP LT 130 MM HG: CPT | Performed by: STUDENT IN AN ORGANIZED HEALTH CARE EDUCATION/TRAINING PROGRAM

## 2023-08-14 NOTE — PROGRESS NOTES
"Subjective:  Modesta Kaur is a 57 y.o. female who presents for     Type 2 diabetes; currently on Metformin 1000mg BID. Denies any issues with medication, states that blood glucose has been well controlled.  Denies any episodes of hypoglycemia, polyphagia, polydipsia, polyuria.  Last eye exam was < 1 year ago, last foot exam was < 1 year ago.      Patient Active Problem List   Diagnosis    Type 2 diabetes mellitus without complication, without long-term current use of insulin    Mild intermittent asthma without complication    Essential hypertension    Seasonal allergic rhinitis due to pollen    History of CVA (cerebrovascular accident)    SHEYLA (obstructive sleep apnea)    Class 2 severe obesity with serious comorbidity and body mass index (BMI) of 35.0 to 35.9 in adult    Right hemiparesis    Gastroesophageal reflux disease without esophagitis    Hyperlipidemia    Vitamin D deficiency    Anemia    Tachycardia    Encounter for screening for malignant neoplasm of colon    History of colitis     Vitals:    Vitals:    08/14/23 1412   BP: 128/82   Pulse: 96   Temp: 98.2 øF (36.8 øC)   TempSrc: Oral   SpO2: 97%   Weight: 85.2 kg (187 lb 12.8 oz)   Height: 152.4 cm (60\")     Body mass index is 36.68 kg/mý.      Current Outpatient Medications:     amLODIPine (NORVASC) 10 MG tablet, Take 1 tablet by mouth Every Morning., Disp: 90 tablet, Rfl: 3    atorvastatin (LIPITOR) 40 MG tablet, Take 1 tablet by mouth every night at bedtime., Disp: 90 tablet, Rfl: 3    Blood Glucose Monitoring Suppl w/Device kit, Check blood sugar once daily for hyperglycemia, Disp: 1 each, Rfl: 0    clopidogrel (PLAVIX) 75 MG tablet, Take 1 tablet by mouth Daily., Disp: 90 tablet, Rfl: 3    ferrous sulfate (FeroSul) 325 (65 FE) MG tablet, Take 1 tablet by mouth Daily With Breakfast., Disp: 90 tablet, Rfl: 3    glucose blood test strip, Check blood sugar once daily for hyperglycemia, Disp: 50 each, Rfl: 12    Lancets 30G misc, 1 each Daily., Disp: 100 " each, Rfl: 12    losartan (COZAAR) 100 MG tablet, Take 1 tablet by mouth Daily., Disp: 90 tablet, Rfl: 3    metFORMIN (GLUCOPHAGE) 1000 MG tablet, Take 1 tablet by mouth 2 (Two) Times a Day With Meals., Disp: 180 tablet, Rfl: 3    pantoprazole (PROTONIX) 20 MG EC tablet, Take 1 tablet by mouth Daily., Disp: 90 tablet, Rfl: 3    Probiotic Product (Probiotic Blend) capsule, Take 1 capsule by mouth Daily With Lunch., Disp: 90 capsule, Rfl: 3    Semaglutide,0.25 or 0.5MG/DOS, (OZEMPIC) 2 MG/1.5ML solution pen-injector, Inject 0.25 mg under the skin into the appropriate area as directed 1 (One) Time Per Week for 30 days, THEN 0.5 mg 1 (One) Time Per Week for 60 days., Disp: 3 mL, Rfl: 0    Patient Active Problem List   Diagnosis    Type 2 diabetes mellitus without complication, without long-term current use of insulin    Mild intermittent asthma without complication    Essential hypertension    Seasonal allergic rhinitis due to pollen    History of CVA (cerebrovascular accident)    SHEYLA (obstructive sleep apnea)    Class 2 severe obesity with serious comorbidity and body mass index (BMI) of 35.0 to 35.9 in adult    Right hemiparesis    Gastroesophageal reflux disease without esophagitis    Hyperlipidemia    Vitamin D deficiency    Anemia    Tachycardia    Encounter for screening for malignant neoplasm of colon    History of colitis     Past Surgical History:   Procedure Laterality Date     SECTION      COLONOSCOPY      COLONOSCOPY N/A 2023    Procedure: COLONOSCOPY;  Surgeon: Homer Mancilla MD;  Location: [facility] ENDOSCOPY;  Service: Gastroenterology;  Laterality: N/A;     Social History     Socioeconomic History    Marital status:    Tobacco Use    Smoking status: Never     Passive exposure: Current    Smokeless tobacco: Never   Vaping Use    Vaping Use: Never used   Substance and Sexual Activity    Alcohol use: No    Drug use: Not Currently    Sexual activity: Defer     Partners: Male     Family  History   Problem Relation Age of Onset    Diabetes Other      Ancillary Procedure on 06/23/2023   Component Date Value Ref Range Status    EF(MOD-bp) 06/23/2023 60.0  % Final    LVIDd 06/23/2023 4.3  cm Final    IVSd 06/23/2023 1.16  cm Final    LVPWd 06/23/2023 1.22  cm Final    IVS/LVPW 06/23/2023 0.95  cm Final    EDV(cubed) 06/23/2023 80.1  ml Final    LVOT area 06/23/2023 2.9  cm2 Final    LV mass(C)d 06/23/2023 183.1  grams Final    LVOT diam 06/23/2023 1.91  cm Final    EDV(MOD-sp2) 06/23/2023 70.1  ml Final    EDV(MOD-sp4) 06/23/2023 75.2  ml Final    ESV(MOD-sp2) 06/23/2023 23.3  ml Final    ESV(MOD-sp4) 06/23/2023 33.7  ml Final    SV(MOD-sp2) 06/23/2023 46.8  ml Final    SV(MOD-sp4) 06/23/2023 41.5  ml Final    EF(MOD-sp2) 06/23/2023 66.8  % Final    EF(MOD-sp4) 06/23/2023 55.2  % Final    MV E max steve 06/23/2023 57.0  cm/sec Final    MV A max steve 06/23/2023 75.0  cm/sec Final    MV E/A 06/23/2023 0.76   Final    Med Peak E' Steve 06/23/2023 5.1  cm/sec Final    Lat Peak E' Steve 06/23/2023 7.9  cm/sec Final    Avg E/e' ratio 06/23/2023 8.77   Final    SV(LVOT) 06/23/2023 49.0  ml Final    RV Base 06/23/2023 3.9  cm Final    RV Mid 06/23/2023 2.9  cm Final    TAPSE (>1.6) 06/23/2023 2.7  cm Final    LA dimension (2D)  06/23/2023 3.3  cm Final    LV V1 max 06/23/2023 90.8  cm/sec Final    LV V1 max PG 06/23/2023 3.3  mmHg Final    LV V1 mean PG 06/23/2023 1.98  mmHg Final    LV V1 VTI 06/23/2023 17.1  cm Final    Ao pk steve 06/23/2023 122.2  cm/sec Final    Ao max PG 06/23/2023 6.0  mmHg Final    Ao mean PG 06/23/2023 2.7  mmHg Final    Ao V2 VTI 06/23/2023 21.7  cm Final    MARSHALL(I,D) 06/23/2023 2.25  cm2 Final    MV max PG 06/23/2023 3.0  mmHg Final    MV mean PG 06/23/2023 1.52  mmHg Final    MV V2 VTI 06/23/2023 16.1  cm Final    MVA(VTI) 06/23/2023 3.0  cm2 Final    TR max steve 06/23/2023 212.8  cm/sec Final    TR max PG 06/23/2023 18.1  mmHg Final    RV V1 max PG 06/23/2023 1.61  mmHg Final    RV V1 max  06/23/2023 63.4  cm/sec Final    RV V1 VTI 06/23/2023 11.6  cm Final    PA V2 max 06/23/2023 104.5  cm/sec Final    Ao root diam 06/23/2023 2.8  cm Final    Sinus 06/23/2023 2.7  cm Final   Office Visit on 05/12/2023   Component Date Value Ref Range Status    Hemoglobin A1C 05/12/2023 7.2  % Final    Lot Number 05/12/2023 [id number]   Final    Expiration Date 05/12/2023 [id number]   Final   Office Visit on 03/21/2023   Component Date Value Ref Range Status    Fibrosis Score (References) 05/01/2023 0.11  0.00 - 0.21 Final    Fibrosis Stage (Reference) 05/01/2023 Comment   Final                       F0 - No fibrosis    Steatosis Score (Reference) 05/01/2023 0.76 (H)  0.00 - 0.30 Final    Steatosis Grade (Reference) 05/01/2023 Comment   Final                S3 - Marked or Severe Steatosis    MENDOZA Score (Reference) 05/01/2023 0.75 (H)  0.25 Final    Mendoza Grade (Reference) 05/01/2023 Comment   Final                           N2 - MENDOZA    Methodology: 05/01/2023 Comment   Final    The analytes tested are performed by FibroSure-Specific methods.  Not intended for use with other diagnostic considerations.    Height: (Reference) 05/01/2023 60  in Final    Weight: (Reference) 05/01/2023 189  LBS Final    Alpha 2-Macroglobulins, Qn 05/01/2023 113  110 - 276 mg/dL Final    Haptoglobin 05/01/2023 94  33 - 346 mg/dL Final    Apolipoprotein A-1 05/01/2023 159  116 - 209 mg/dL Final    Total Bilirubin 05/01/2023 0.5  0.0 - 1.2 mg/dL Final    GGT 05/01/2023 53  0 - 60 IU/L Final    ALT (SGPT) 05/01/2023 34  0 - 40 IU/L Final    AST (SGOT) P5P (Reference) 05/01/2023 22  0 - 40 IU/L Final    Cholesterol, Total (Reference) 05/01/2023 144  100 - 199 mg/dL Final    Glucose, Serum (Reference) 05/01/2023 132 (H)  70 - 99 mg/dL Final    Triglycerides 05/01/2023 148  0 - 149 mg/dL Final    Interpretations: (Reference) 05/01/2023 Comment   Final    Comment: Quantitative results of 10 biochemicals in combination with  age, gender, height, and weight,  are analyzed using a  computational algorithm to provide a quantitative surrogate  marker (0.0-1.0) of liver fibrosis (Metavir F0-F4), hepatic  steatosis (0.0-1.0, S0-S3), and Non-Alcoholic Steato-  Hepatitis (MENDOZA) (0.0-0.75, N0-N2). The absence of steatosis  (S<0.38) precludes the diagnosis of MENDOZA.  Fibrosis marker:  In a study of 171 Non-Alcoholic Fatty  Liver Disease (NAFLD) patients where 23% had significant  NAFLD fibrosis (Metavir F2-F4) and 11% had cirrhosis by  liver biopsy, a fibrosis result of >0.3 yielded a  sensitivity of 83% and a specificity of 78% for the  detection of significant fibrosis(1).  Steatosis Marker:  In a population of 744 patients (583 HCV,  18 HBV, 69 NAFLD, and 74 alcoholic disease patients), where  36% had significant steatosis (>5%) on a liver biopsy, a  steatosis score >0.5 had a sensitivity of 71% and a  specificity of 72% for identification of                            significant  steatosis(2).  MENDOZA marker:  In a population of 257 NAFLD patients, where  62% had at least some MENDOZA by liver biopsy, a prediction of  MENDOZA had a sensitivity of 88% for identifying MENDOZA and a  specificity of 50%(3).    Fibrosis Scorin2023 Comment   Final         <=0.21 = Stage F0 - No fibrosis  0.21 - 0.27 = Stage F0 - F1  0.27 - 0.31 = Stage F1 - Portal fibrosis  0.31 - 0.48 = Stage F1 - F2  0.48 - 0.58 = Stage F2 - Bridging fibrosis with few septa  0.58 - 0.72 = Stage F3 - Bridging fibrosis with many septa  0.72 - 0.74 = Stage F3 - F4        >0.74 = Stage F4 - Cirrhosis    Steatosis Grading (Reference) 2023 Comment   Final          < 0.30 = S0 - No Steatosis  0.30 to 0.38 = S0 - S1  0.38 to 0.48 = S1 - Minimal Steatosis  0.48 to 0.57 = S1 - S2  0.57 to 0.67 = S2 - Moderate Steatosis  0.67 to 0.69 = S2 - S3        > 0.69 = S3 - Marked or Severe Steatosis    Mendoza Scoring (Reference) 2023 Comment   Final    0.25 = N0 - Not MENDOZA  0.50 = N1 - Borderline or probable MENDOZA  0.75 = N2  - MENDOZA    Limitations: (Reference) 05/01/2023 Comment   Final    MENDOZA FibroSure is recommended for patients with suspected non-  alcoholic fatty liver disease.  It is not recommended for patients  with other liver diseases.  It is also not recommended in patients  with Gilbert Disease, acute hemolysis, acute viral hepatitis, drug  induced hepatitis, genetic liver disease, autoimmune hepatitis and/or  extra-hepatic cholestasis.  Any of these clinical situations may lead  to inaccurate quantitative predictions of fibrosis.    Comment (Reference) 05/01/2023 Comment   Final    Comment: This test was developed and its performance characteristics determined  by [facility].  It has not been cleared or approved by the Food and Drug  Administration.  The FDA has determined that such clearance or  approval is not necessary.  For questions regarding this report please contact  customer service at [phone].  References:  1. Ana MUNROE et al. Diagnostic Value of Biochemical Markers     (FibroTest) for the prediction of Liver Fibrosis in     patients with Non-Alcoholic Fatty Liver Disease. BMC     Gastroenterology 2006; 6:6.  2. CINTHIA Garcia. et al. The Diagnostic Value of Biomarkers     (Steato Test) for the Prediction of Liver Steatosis.     Comparative Hepatol. 2005; 4:10.  3. CINTHIA Garcia, Demetria Perez, et al. Diagnostic value     of biochemical markers (MENDOZA TEST) for the prediction of     non alcohol steato hepatitis in patients with non-     alcoholic fatty liver disease. BMC Gastroenterology 2006;     6:34 doi:10.1186/4671-779B-3-34.  **Effective June 19, 2023                            MENDOZA FibroSure will be made non-orderable.**    [facility] offers [id number] MENDOZA FibroSure(R) Plus.    Protime 05/01/2023 14.2  11.1 - 15.3 Seconds Final    INR 05/01/2023 1.12  0.80 - 1.20 Final    ALPHA-FETOPROTEIN 05/01/2023 <2  0 - 8.3 ng/mL Final    Alkaline Phosphatase 05/01/2023 155 (H)  44 - 121 IU/L Final    Liver Fraction:  05/01/2023 54  18 - 85 % Final    Bone Fraction: 05/01/2023 30  14 - 68 % Final    Intestinal Frac.: 05/01/2023 16  0 - 18 % Final   Lab on 03/16/2023   Component Date Value Ref Range Status    WBC 03/16/2023 8.79  3.40 - 10.80 10*3/mm3 Final    RBC 03/16/2023 4.54  3.77 - 5.28 10*6/mm3 Final    Hemoglobin 03/16/2023 13.5  12.0 - 15.9 g/dL Final    Hematocrit 03/16/2023 39.5  34.0 - 46.6 % Final    MCV 03/16/2023 87.0  79.0 - 97.0 fL Final    MCH 03/16/2023 29.7  26.6 - 33.0 pg Final    MCHC 03/16/2023 34.2  31.5 - 35.7 g/dL Final    RDW 03/16/2023 13.5  12.3 - 15.4 % Final    RDW-SD 03/16/2023 42.8  37.0 - 54.0 fl Final    MPV 03/16/2023 11.9  6.0 - 12.0 fL Final    Platelets 03/16/2023 327  140 - 450 10*3/mm3 Final    Neutrophil % 03/16/2023 59.5  42.7 - 76.0 % Final    Lymphocyte % 03/16/2023 28.2  19.6 - 45.3 % Final    Monocyte % 03/16/2023 6.5  5.0 - 12.0 % Final    Eosinophil % 03/16/2023 4.0  0.3 - 6.2 % Final    Basophil % 03/16/2023 1.0  0.0 - 1.5 % Final    Immature Grans % 03/16/2023 0.8 (H)  0.0 - 0.5 % Final    Neutrophils, Absolute 03/16/2023 5.23  1.70 - 7.00 10*3/mm3 Final    Lymphocytes, Absolute 03/16/2023 2.48  0.70 - 3.10 10*3/mm3 Final    Monocytes, Absolute 03/16/2023 0.57  0.10 - 0.90 10*3/mm3 Final    Eosinophils, Absolute 03/16/2023 0.35  0.00 - 0.40 10*3/mm3 Final    Basophils, Absolute 03/16/2023 0.09  0.00 - 0.20 10*3/mm3 Final    Immature Grans, Absolute 03/16/2023 0.07 (H)  0.00 - 0.05 10*3/mm3 Final    nRBC 03/16/2023 0.0  0.0 - 0.2 /100 WBC Final    Glucose 03/16/2023 115 (H)  65 - 99 mg/dL Final    BUN 03/16/2023 13  6 - 20 mg/dL Final    Creatinine 03/16/2023 0.76  0.57 - 1.00 mg/dL Final    Sodium 03/16/2023 140  136 - 145 mmol/L Final    Potassium 03/16/2023 4.3  3.5 - 5.2 mmol/L Final    Chloride 03/16/2023 104  98 - 107 mmol/L Final    CO2 03/16/2023 26.0  22.0 - 29.0 mmol/L Final    Calcium 03/16/2023 9.3  8.6 - 10.5 mg/dL Final    Total Protein 03/16/2023 8.0  6.0 - 8.5 g/dL  Final    Albumin 03/16/2023 4.6  3.5 - 5.2 g/dL Final    ALT (SGPT) 03/16/2023 37 (H)  1 - 33 U/L Final    AST (SGOT) 03/16/2023 26  1 - 32 U/L Final    Alkaline Phosphatase 03/16/2023 191 (H)  39 - 117 U/L Final    Total Bilirubin 03/16/2023 0.5  0.0 - 1.2 mg/dL Final    Globulin 03/16/2023 3.4  gm/dL Final    A/G Ratio 03/16/2023 1.4  g/dL Final    BUN/Creatinine Ratio 03/16/2023 17.1  7.0 - 25.0 Final    Anion Gap 03/16/2023 10.0  5.0 - 15.0 mmol/L Final    eGFR 03/16/2023 91.0  >60.0 mL/min/1.73 Final    TSH 03/16/2023 2.680  0.270 - 4.200 uIU/mL Final    Free T4 03/16/2023 1.00  0.93 - 1.70 ng/dL Final   Ancillary Procedure on 03/16/2023   Component Date Value Ref Range Status    Target HR (85%) 03/16/2023 138  bpm Final    Max. Pred. HR (100%) 03/16/2023 162  bpm Final   Office Visit on 03/16/2023   Component Date Value Ref Range Status    QT Interval 03/16/2023 366  ms Final    QTC Interval 03/16/2023 457  ms Final      Adult Transthoracic Echo Complete w/ Color, Spectral and Contrast if Necessary Per Protocol    Left ventricular systolic function is normal. Calculated left   ventricular EF = 60% Left ventricular ejection fraction appears to be 56 -   60%.    Left ventricular diastolic function is consistent with (grade I)   impaired relaxation.    Estimated right ventricular systolic pressure from tricuspid   regurgitation is normal (<35 mmHg).      [unfilled]  Immunization History   Administered Date(s) Administered    COVID-19 (MODERNA) 1st,2nd,3rd Dose Monovalent 04/13/2021, 05/18/2021, 11/30/2021     The following portions of the patient's history were reviewed and updated as appropriate: allergies, current medications, past family history, past medical history, past social history, past surgical history and problem list.    PHQ-9 Total Score:         Lab 08/14/23  1520   HEMOGLOBIN A1C 6.7         Physical Exam  Constitutional:       Appearance: Normal appearance.   HENT:      Head: Normocephalic and  atraumatic.      Right Ear: External ear normal.      Left Ear: External ear normal.   Eyes:      General:         Right eye: No discharge.         Left eye: No discharge.      Conjunctiva/sclera: Conjunctivae normal.   Cardiovascular:      Rate and Rhythm: Normal rate and regular rhythm.      Pulses: Normal pulses.      Heart sounds: Normal heart sounds. No murmur heard.  Pulmonary:      Effort: Pulmonary effort is normal. No respiratory distress.      Breath sounds: Normal breath sounds.   Abdominal:      General: There is no distension.      Palpations: Abdomen is soft.      Tenderness: There is no abdominal tenderness.   Musculoskeletal:      Cervical back: Normal range of motion.      Right lower leg: No edema.      Left lower leg: No edema.   Lymphadenopathy:      Cervical: No cervical adenopathy.   Neurological:      Mental Status: She is alert. Mental status is at baseline.   Psychiatric:         Mood and Affect: Mood normal.         Behavior: Behavior normal.       Assessment & Plan    Diagnosis Plan   1. Type 2 diabetes mellitus with other circulatory complication, without long-term current use of insulin  POCT glycated hemoglobin, total    Semaglutide,0.25 or 0.5MG/DOS, (OZEMPIC) 2 MG/1.5ML solution pen-injector         Orders Placed This Encounter   Procedures    POCT glycated hemoglobin, total     Order Specific Question:   Release to patient     Answer:   Routine Release [[id number]]     Type 2 diabetes; denied issues with current medications, A1c today 6.7%, reassuring.  Continue to encourage diet, exercise, weight loss, up-to-date  on eye and foot exams. On ACE/ARB.  After discussion with patient, will start on Ozempic as above to promote weight loss due to history of fatty liver in addition to diabetes. Follow-up in 2 months or sooner if needed.            This document has been electronically signed by Ronn Forte MD on August 14, 2023 18:15 CDT    [employer] Disclaimer: Some of this  note may be an electronic transcription/translation of spoken language to printed text.  The electronic translation of spoken language may permit erroneous, or at times, nonsensical words or phrases to be inadvertently transcribed. Although I have reviewed the note for such errors, some may still exist.

## 2023-09-28 DIAGNOSIS — E11.59 TYPE 2 DIABETES MELLITUS WITH OTHER CIRCULATORY COMPLICATION, WITHOUT LONG-TERM CURRENT USE OF INSULIN: ICD-10-CM

## (undated) DEVICE — CANN SMPL SOFTECH BIFLO ETCO2 A/M 7FT